# Patient Record
Sex: MALE | Race: WHITE | Employment: OTHER | URBAN - METROPOLITAN AREA
[De-identification: names, ages, dates, MRNs, and addresses within clinical notes are randomized per-mention and may not be internally consistent; named-entity substitution may affect disease eponyms.]

---

## 2017-01-16 ENCOUNTER — OFFICE VISIT (OUTPATIENT)
Dept: RHEUMATOLOGY | Facility: CLINIC | Age: 67
End: 2017-01-16

## 2017-01-16 VITALS
SYSTOLIC BLOOD PRESSURE: 159 MMHG | DIASTOLIC BLOOD PRESSURE: 66 MMHG | WEIGHT: 265 LBS | HEART RATE: 60 BPM | HEIGHT: 73 IN | BODY MASS INDEX: 35.12 KG/M2

## 2017-01-16 DIAGNOSIS — Z51.81 THERAPEUTIC DRUG MONITORING: ICD-10-CM

## 2017-01-16 DIAGNOSIS — M35.3 POLYMYALGIA RHEUMATICA (HCC): Primary | ICD-10-CM

## 2017-01-16 DIAGNOSIS — R70.0 ELEVATED SED RATE: ICD-10-CM

## 2017-01-16 DIAGNOSIS — G89.29 CHRONIC MIDLINE LOW BACK PAIN WITHOUT SCIATICA: ICD-10-CM

## 2017-01-16 DIAGNOSIS — M54.50 CHRONIC MIDLINE LOW BACK PAIN WITHOUT SCIATICA: ICD-10-CM

## 2017-01-16 PROCEDURE — 99214 OFFICE O/P EST MOD 30 MIN: CPT | Performed by: INTERNAL MEDICINE

## 2017-01-16 PROCEDURE — G0463 HOSPITAL OUTPT CLINIC VISIT: HCPCS | Performed by: INTERNAL MEDICINE

## 2017-01-16 RX ORDER — HYDROCODONE BITARTRATE AND ACETAMINOPHEN 10; 325 MG/1; MG/1
2 TABLET ORAL EVERY 8 HOURS PRN
Qty: 180 TABLET | Refills: 0 | Status: SHIPPED | OUTPATIENT
Start: 2017-01-20 | End: 2017-02-19

## 2017-01-16 RX ORDER — HYDROCODONE BITARTRATE AND ACETAMINOPHEN 10; 325 MG/1; MG/1
2 TABLET ORAL EVERY 8 HOURS PRN
Qty: 180 TABLET | Refills: 0 | Status: SHIPPED | OUTPATIENT
Start: 2017-02-19 | End: 2017-03-21

## 2017-01-16 RX ORDER — HYDROCODONE BITARTRATE AND ACETAMINOPHEN 10; 325 MG/1; MG/1
2 TABLET ORAL EVERY 8 HOURS PRN
Qty: 180 TABLET | Refills: 0 | Status: SHIPPED | OUTPATIENT
Start: 2017-03-20 | End: 2017-04-19

## 2017-01-16 NOTE — PATIENT INSTRUCTIONS
1. gabapentin 800mg at night   2. Refill norco 10/325mg 2 tablets  every 8 hours #180 with 2 refills   3. Return to clinic in 3 months.

## 2017-01-16 NOTE — PROGRESS NOTES
Ninfa Juárez is a 78year old male who presents for Patient presents with:  PMR  .   HPI:     He is a pleasant 78 year who has posssible PMR. He is here on 1/16/2017. I had last seen her on 10/21/2016. I had last seen him on  7/18/2016.  I had last seen for his legs - it is really hard for him to walk up stairs. He feels that during the time he had a virus -in 2002 -  years ago. He was getting checked for bone cancer for a few yearss. - looks like roberto/kappa and ivania carcamo.    He had a lot of testeing switching to coumadin from xarleto. He saw cardiologist yetserday. He's able walk without his cane. He is feeling he can move his shsoulders and hips now wihtout a cane while on norco.   D/w him that norco not used for PMR.  He feels he is intolerant to kids.   He has 0/10 pain right now. Wt Readings from Last 2 Encounters:  01/16/17 : 265 lb (120.203 kg)  10/21/16 : 272 lb (123.378 kg)    Body mass index is 34.97 kg/(m^2).         Current Outpatient Prescriptions:  GABAPENTIN 800 MG Oral Tab TAKE ON Garlic 5043 MG Oral Cap Take  by mouth. Take 2 tabs two times per day Disp:  Rfl:    POTASSIUM OR Take 500 mg by mouth. Take 2 tabs. two times per day Disp:  Rfl:    glimepiride (AMARYL) 4 MG Oral Tab 4 mg 2 (two) times daily.  Disp:  Rfl:    LANTUS SOLOS knee tendernes s  Left ankle deviated laterally s/p fusisno       si joint film - 4/16/15 - . Mild degenerative change both sacroiliac joints. 2. Mild to moderate degenerative arthritis lower lumbar spine. 6/12/2015 - hip xrays - b/l  1.  Minimal degene mayte seronnegative arthtritis  - still unclear -d/w pt - he thinks he has PMR more than sero negative RA  -    - hx elevated ESR and Crp -   But not tolerating DMARDS -   - doing well   - he takes 2 norco tid -   He's likely hooked he can't decrease hi coumadin for pacemaker -     Summary:  1. gabapentin 800mg at night   2. Refill norco 10/325mg 2 tablets  every 8 hours #180 with 2 refills   3. Return to clinic in 3 months.          Chantal Monsivais MD  1/16/2017   3:01 PM

## 2017-01-26 ENCOUNTER — PRIOR ORIGINAL RECORDS (OUTPATIENT)
Dept: OTHER | Age: 67
End: 2017-01-26

## 2017-02-08 ENCOUNTER — PRIOR ORIGINAL RECORDS (OUTPATIENT)
Dept: OTHER | Age: 67
End: 2017-02-08

## 2017-04-04 ENCOUNTER — PRIOR ORIGINAL RECORDS (OUTPATIENT)
Dept: OTHER | Age: 67
End: 2017-04-04

## 2017-04-10 RX ORDER — GABAPENTIN 800 MG/1
TABLET ORAL
Qty: 30 TABLET | Refills: 3 | Status: SHIPPED | OUTPATIENT
Start: 2017-04-10 | End: 2017-08-08

## 2017-04-10 NOTE — TELEPHONE ENCOUNTER
LOV:1-16  Last Filled:12-12, #30 with 3 refills  Labs:   Future Appointments  Date Time Provider Hien Morales   4/17/2017 2:30 PM Tila Blackman MD 2014 [facility]       Please Advise

## 2017-04-17 ENCOUNTER — OFFICE VISIT (OUTPATIENT)
Dept: RHEUMATOLOGY | Facility: CLINIC | Age: 67
End: 2017-04-17

## 2017-04-17 VITALS
DIASTOLIC BLOOD PRESSURE: 77 MMHG | HEIGHT: 73 IN | WEIGHT: 261.38 LBS | HEART RATE: 70 BPM | SYSTOLIC BLOOD PRESSURE: 153 MMHG | BODY MASS INDEX: 34.64 KG/M2

## 2017-04-17 DIAGNOSIS — M35.3 POLYMYALGIA RHEUMATICA (HCC): Primary | ICD-10-CM

## 2017-04-17 DIAGNOSIS — Z51.81 THERAPEUTIC DRUG MONITORING: ICD-10-CM

## 2017-04-17 DIAGNOSIS — M54.50 CHRONIC MIDLINE LOW BACK PAIN WITHOUT SCIATICA: ICD-10-CM

## 2017-04-17 DIAGNOSIS — G89.29 CHRONIC MIDLINE LOW BACK PAIN WITHOUT SCIATICA: ICD-10-CM

## 2017-04-17 PROCEDURE — 99214 OFFICE O/P EST MOD 30 MIN: CPT | Performed by: INTERNAL MEDICINE

## 2017-04-17 PROCEDURE — G0463 HOSPITAL OUTPT CLINIC VISIT: HCPCS | Performed by: INTERNAL MEDICINE

## 2017-04-17 RX ORDER — HYDROCODONE BITARTRATE AND ACETAMINOPHEN 10; 325 MG/1; MG/1
2 TABLET ORAL EVERY 8 HOURS PRN
Qty: 180 TABLET | Refills: 0 | Status: SHIPPED | OUTPATIENT
Start: 2017-04-19 | End: 2017-05-19

## 2017-04-17 RX ORDER — HYDROCODONE BITARTRATE AND ACETAMINOPHEN 10; 325 MG/1; MG/1
2 TABLET ORAL EVERY 8 HOURS PRN
Qty: 180 TABLET | Refills: 0 | Status: SHIPPED | OUTPATIENT
Start: 2017-05-19 | End: 2017-06-18

## 2017-04-17 RX ORDER — HYDROCODONE BITARTRATE AND ACETAMINOPHEN 10; 325 MG/1; MG/1
2 TABLET ORAL EVERY 8 HOURS PRN
Qty: 180 TABLET | Refills: 0 | Status: SHIPPED | OUTPATIENT
Start: 2017-06-18 | End: 2017-07-18

## 2017-04-17 NOTE — PROGRESS NOTES
Wilma Moreno is a 78year old male who presents for Patient presents with: Ankle Pain: letft  Knee Pain  Back Pain: lower back  Hip Pain  . HPI:     He is a pleasant 78 year who has posssible PMR. He is here on 4/17/2017.  I had last seen him on 1/16 He had flu 4 times this year in 2015. . During a time of bronchitis - he was on prednisone and he felt so good on it. He felt all his pain went away. He never had PT for his legs - it is really hard for him to walk up stairs.    He feels that during the t He got bad nasuea with azathioprine. He got sick to his stomach. He stopped ssz b/c of this as well. He's only on norco 2 tablets and 2 at night. It's been helping him. The pain is in his lower back and hips and shoulders.  He' s on gabapentin 600mg at ni He has on pain. He take the norco three times a day - if he misses the 4pm dose - 7pm he feels really bad pain a dn he gets a trerible feeling. -   He feels nausea if he misses it. He doesn't use a can all the time and no cane today.    His shoulders are ok Ranitidine HCl (ZANTAC) 150 MG Oral Tab Take 150 mg by mouth nightly. Disp:  Rfl:    Multiple Vitamins-Minerals (MULTIVITAMIN OR) Take  by mouth. Take 1/2 tab two times every day Disp:  Rfl:    GLUCOSAMINE-CHONDROITIN OR Take by mouth.  Take 2 tabs in the [facility] [place] [facility] [place]  -      REVIEW OF SYSTEMS:   Review Of Systems:  All other ROS are negative.      EXAM:   /77 mmHg  Pulse 70  Ht 6' 1\" (1.854 m)  Wt 261 lb 6.4 oz (118.57 kg)  BMI 34.49 kg/m2  HEENT: Clear oropharynx, no oral ulcers, EOM intact, GFR/NON-      >60  50 (L)   GFR/      >60  >60   WBC      4.0 - 11.0 K/UL  9.5   RBC      4.50 - 5.90 M/UL  3.80 (L)   Hemoglobin      13.5 - 17.5 G/DL  12.4 (L)   Hematocrit      41.0 - 52.0 %  36.7 (L)   MCV      80.0 - 10 - cont.  gabepentin  800mg at night.  D/w him about increasing this but he doesn's want to .   - consider 2nd opinion - on how to treat -     - in the past -  had a long discussion about his care - d/w him that rheumatology wise not able to treat his inflam

## 2017-06-26 RX ORDER — FOLIC ACID 1 MG/1
TABLET ORAL
Qty: 60 TABLET | Refills: 11 | Status: SHIPPED | OUTPATIENT
Start: 2017-06-26 | End: 2018-06-29

## 2017-06-26 NOTE — TELEPHONE ENCOUNTER
LOV:1-13  Last Filled:6-21-16, #60 with 11 refills  Labs:   Future Appointments  Date Time Provider Hien Morales   7/17/2017 2:50 PM Yane Burton MD 2014 [facility]       Please Advise

## 2017-07-05 ENCOUNTER — MYAURORA ACCOUNT LINK (OUTPATIENT)
Dept: OTHER | Age: 67
End: 2017-07-05

## 2017-07-17 ENCOUNTER — TELEPHONE (OUTPATIENT)
Dept: RHEUMATOLOGY | Facility: CLINIC | Age: 67
End: 2017-07-17

## 2017-07-17 NOTE — TELEPHONE ENCOUNTER
Pt states he had to miss his appt today because his wife was in ER. Pt states he still needs to be seen ASAP for a follow up and is requesting to be added this week, preferably Wednesday morning. Please advise.

## 2017-07-18 NOTE — TELEPHONE ENCOUNTER
Try to have him book as soon as he can - will fill his norco for one month on Wednesday am he can pick it up

## 2017-07-19 ENCOUNTER — OFFICE VISIT (OUTPATIENT)
Dept: RHEUMATOLOGY | Facility: CLINIC | Age: 67
End: 2017-07-19

## 2017-07-19 VITALS
WEIGHT: 260 LBS | DIASTOLIC BLOOD PRESSURE: 76 MMHG | HEIGHT: 72 IN | HEART RATE: 70 BPM | BODY MASS INDEX: 35.21 KG/M2 | SYSTOLIC BLOOD PRESSURE: 149 MMHG

## 2017-07-19 DIAGNOSIS — M35.3 POLYMYALGIA RHEUMATICA (HCC): Primary | ICD-10-CM

## 2017-07-19 DIAGNOSIS — M54.50 CHRONIC MIDLINE LOW BACK PAIN WITHOUT SCIATICA: ICD-10-CM

## 2017-07-19 DIAGNOSIS — G89.29 CHRONIC MIDLINE LOW BACK PAIN WITHOUT SCIATICA: ICD-10-CM

## 2017-07-19 PROCEDURE — G0463 HOSPITAL OUTPT CLINIC VISIT: HCPCS | Performed by: INTERNAL MEDICINE

## 2017-07-19 PROCEDURE — 99213 OFFICE O/P EST LOW 20 MIN: CPT | Performed by: INTERNAL MEDICINE

## 2017-07-19 RX ORDER — HYDROCODONE BITARTRATE AND ACETAMINOPHEN 10; 325 MG/1; MG/1
2 TABLET ORAL EVERY 8 HOURS PRN
Qty: 180 TABLET | Refills: 0 | Status: SHIPPED | OUTPATIENT
Start: 2017-08-18 | End: 2017-09-17

## 2017-07-19 RX ORDER — HYDROCODONE BITARTRATE AND ACETAMINOPHEN 10; 325 MG/1; MG/1
2 TABLET ORAL EVERY 8 HOURS PRN
Qty: 180 TABLET | Refills: 0 | Status: SHIPPED | OUTPATIENT
Start: 2017-09-17 | End: 2017-10-17

## 2017-07-19 RX ORDER — HYDROCODONE BITARTRATE AND ACETAMINOPHEN 10; 325 MG/1; MG/1
2 TABLET ORAL EVERY 8 HOURS PRN
Qty: 180 TABLET | Refills: 0 | Status: SHIPPED | OUTPATIENT
Start: 2017-07-19 | End: 2017-08-18

## 2017-07-19 NOTE — PROGRESS NOTES
Claudio Mcneill is a 75year old male who presents for Patient presents with: Follow - Up: muscle and joint pain  . HPI:     He is a pleasant 78 year who has posssible PMR. He is here on 7/19/2017. I had last seen him on 4/17/2017.  I had last seen him He had flu 4 times this year in 2015. . During a time of bronchitis - he was on prednisone and he felt so good on it. He felt all his pain went away. He never had PT for his legs - it is really hard for him to walk up stairs.    He feels that during the t He got bad nasuea with azathioprine. He got sick to his stomach. He stopped ssz b/c of this as well. He's only on norco 2 tablets and 2 at night. It's been helping him. The pain is in his lower back and hips and shoulders.  He' s on gabapentin 600mg at ni He has on pain. He take the norco three times a day - if he misses the 4pm dose - 7pm he feels really bad pain a dn he gets a trerible feeling. -   He feels nausea if he misses it. He doesn't use a can all the time and no cane today.    His shoulders are ok Lisinopril-Hydrochlorothiazide 20-12.5 MG Oral Tab daily. Disp:  Rfl:    MetFORMIN HCl ER (GLUCOPHAGE-XR) 500 MG Oral Tablet 24 Hr 500 mg 2 (two) times daily. Disp:  Rfl:    Ranitidine HCl (ZANTAC) 150 MG Oral Tab Take 150 mg by mouth nightly.  Disp:  Rfl: Packs/day: 2.00      Years: 30.00        Quit date: 11/12/2000  Alcohol use:  No               Retired railroad, still at fire department   4 children -   1 daughter 1 nurse, 1 is , 1 beautician,   Son - Holden  -      [address] [place] [id number] 17 - 63 U/L  25   ALK PHOSPHATASE (P)      32 - 100 U/L  86   TOTAL BILIRUBIN      0.3 - 1.2 mg/dL  0.5   TOTAL PROTEIN      5.9 - 8.4 g/dL  7.1   Albumin      3.5 - 4.8 g/dL  3.2 (L)   GLOBULIN, TOTAL      2.5 - 3.7 g/dL  3.9 (H)   A/G RATIO      1.0 - off pred b/c of sugars -doesn't want to restart - .   - stop  methotrexate B/c of nauseas , stoppe leflunomide b/c of sore throat   - stopped imuarn b/c didn't help pain and was nauseous -   - stop  Fa    -  stop ssz b/c of -   - he should call if pain i

## 2017-07-19 NOTE — PATIENT INSTRUCTIONS
1. gabapentin 800mg at night   2. Refill norco 10/325mg 2 tablets  every 8 hours #180 with 2 refills   3. Return to clinic in 3 months. 4. Info on tocalizumab given -   Tocilizumab injection  What is this medicine?   TOCILIZUMAB (TOE si RAVEN ue mab) is use they continue or are bothersome. ):  · dizziness  · headache  · pain, redness, or irritation at site where injected  What may interact with this medicine?   Do not take this medicine with any of the following medications:  · live virus vaccines  This medicin tuberculosis or have recently been in close contact with someone who has tuberculosis  · an unusual or allergic reaction to tocilizumab, other medicines, foods, dyes, or preservatives  · pregnant or trying to get pregnant  · breast-feeding  What should I w prepare and give this medicine. Use exactly as directed. Take your medicine at regular intervals. Do not take your medicine more often than directed. It is important that you put your used needles and syringes in a special sharps container.  Do not put the cortisone  · theophylline  · tositumomab  · vaccines  · warfarin  What if I miss a dose? It is important not to miss your dose. Call your doctor or health care professional if you are unable to keep an appointment.  If you give yourself the medicine and yo TB medicine before starting this medicine. Make sure to finish the full course of TB medicine. Talk to your doctor about your risk of cancer. You may be more at risk for certain types of cancers if you take this medicine.   Call your doctor or health care as young as 2 years for selected conditions, precautions do apply. What side effects may I notice from receiving this medicine?   Side effects that you should report to your doctor or health care professional as soon as possible:  · allergic reactions like medicine?   They need to know if you have any of these conditions:  · cancer  · diabetes  · diverticulitis  · heart disease  · hepatitis B or history of hepatitis B infection  · high blood pressure  · high cholesterol  · history of pancreatitis  · immune sy talk to your doctor, pharmacist, or health care provider.  Copyright© 2016 [employer]

## 2017-08-08 RX ORDER — GABAPENTIN 800 MG/1
TABLET ORAL
Qty: 30 TABLET | Refills: 3 | Status: SHIPPED | OUTPATIENT
Start: 2017-08-08 | End: 2018-10-18

## 2017-08-23 ENCOUNTER — PRIOR ORIGINAL RECORDS (OUTPATIENT)
Dept: OTHER | Age: 67
End: 2017-08-23

## 2017-10-18 ENCOUNTER — OFFICE VISIT (OUTPATIENT)
Dept: RHEUMATOLOGY | Facility: CLINIC | Age: 67
End: 2017-10-18

## 2017-10-18 ENCOUNTER — APPOINTMENT (OUTPATIENT)
Dept: LAB | Facility: HOSPITAL | Age: 67
End: 2017-10-18
Attending: INTERNAL MEDICINE
Payer: MEDICARE

## 2017-10-18 VITALS
HEIGHT: 72 IN | DIASTOLIC BLOOD PRESSURE: 76 MMHG | SYSTOLIC BLOOD PRESSURE: 150 MMHG | HEART RATE: 71 BPM | BODY MASS INDEX: 34.95 KG/M2 | WEIGHT: 258 LBS

## 2017-10-18 DIAGNOSIS — F11.23 WITHDRAWAL FROM OPIOIDS (HCC): ICD-10-CM

## 2017-10-18 DIAGNOSIS — M79.10 MYALGIA: ICD-10-CM

## 2017-10-18 DIAGNOSIS — Z51.81 THERAPEUTIC DRUG MONITORING: ICD-10-CM

## 2017-10-18 DIAGNOSIS — M35.3 PMR (POLYMYALGIA RHEUMATICA) (HCC): ICD-10-CM

## 2017-10-18 DIAGNOSIS — F32.A DEPRESSION, UNSPECIFIED DEPRESSION TYPE: ICD-10-CM

## 2017-10-18 DIAGNOSIS — M35.3 PMR (POLYMYALGIA RHEUMATICA) (HCC): Primary | ICD-10-CM

## 2017-10-18 PROCEDURE — 36415 COLL VENOUS BLD VENIPUNCTURE: CPT

## 2017-10-18 PROCEDURE — 99214 OFFICE O/P EST MOD 30 MIN: CPT | Performed by: INTERNAL MEDICINE

## 2017-10-18 PROCEDURE — 86140 C-REACTIVE PROTEIN: CPT

## 2017-10-18 PROCEDURE — G0463 HOSPITAL OUTPT CLINIC VISIT: HCPCS | Performed by: INTERNAL MEDICINE

## 2017-10-18 PROCEDURE — 85652 RBC SED RATE AUTOMATED: CPT

## 2017-10-18 RX ORDER — HYDROCODONE BITARTRATE AND ACETAMINOPHEN 10; 325 MG/1; MG/1
2 TABLET ORAL EVERY 8 HOURS PRN
Qty: 180 TABLET | Refills: 0 | Status: SHIPPED | OUTPATIENT
Start: 2017-11-18 | End: 2017-12-18

## 2017-10-18 RX ORDER — HYDROCODONE BITARTRATE AND ACETAMINOPHEN 10; 325 MG/1; MG/1
2 TABLET ORAL 2 TIMES DAILY
Status: ON HOLD | COMMUNITY
End: 2018-10-27

## 2017-10-18 RX ORDER — DULOXETIN HYDROCHLORIDE 20 MG/1
CAPSULE, DELAYED RELEASE ORAL
Qty: 60 CAPSULE | Refills: 1 | Status: SHIPPED | OUTPATIENT
Start: 2017-10-18 | End: 2017-12-26

## 2017-10-18 RX ORDER — HYDROCODONE BITARTRATE AND ACETAMINOPHEN 10; 325 MG/1; MG/1
2 TABLET ORAL EVERY 8 HOURS PRN
Qty: 180 TABLET | Refills: 0 | Status: SHIPPED | OUTPATIENT
Start: 2017-12-18 | End: 2018-01-17

## 2017-10-18 RX ORDER — HYDROCODONE BITARTRATE AND ACETAMINOPHEN 10; 325 MG/1; MG/1
2 TABLET ORAL EVERY 8 HOURS PRN
Qty: 180 TABLET | Refills: 0 | Status: SHIPPED | OUTPATIENT
Start: 2017-10-18 | End: 2017-11-17

## 2017-10-18 NOTE — PROGRESS NOTES
Lan Cruz is a 75year old male who presents for Patient presents with:  PMR: muscle pain, pain wakes him up  . HPI:     He is a pleasant 78 year who has posssible PMR. He is here on 10/18/2017. I had alst seen him on 7/19/2017.  I had last seen hi He had flu 4 times this year in 2015. . During a time of bronchitis - he was on prednisone and he felt so good on it. He felt all his pain went away. He never had PT for his legs - it is really hard for him to walk up stairs.    He feels that during the t He got bad nasuea with azathioprine. He got sick to his stomach. He stopped ssz b/c of this as well. He's only on norco 2 tablets and 2 at night. It's been helping him. The pain is in his lower back and hips and shoulders.  He' s on gabapentin 600mg at ni He has on pain. He take the norco three times a day - if he misses the 4pm dose - 7pm he feels really bad pain a dn he gets a trerible feeling. -   He feels nausea if he misses it. He doesn't use a can all the time and no cane today.    His shoulders are ok Wt Readings from Last 2 Encounters:  10/18/17 : 258 lb (117 kg)  07/19/17 : 260 lb (117.9 kg)    Body mass index is 34.99 kg/m².         Current Outpatient Prescriptions:  HYDROcodone-acetaminophen  MG Oral Tab Take 1 tablet by mouth 3 (three) times d LANTUS SOLOSTAR 100 UNIT/ML Subcutaneous Solution Pen-injector 50 Units every morning. Disp:  Rfl:    tamsulosin HCl (FLOMAX) 0.4 MG Oral Cap 2 tablets daily.  Disp:  Rfl:       Past Medical History:   Diagnosis Date   • GAB on CPAP    • Type II or unspec 2. Mild to moderate degenerative arthritis lower lumbar spine. 6/12/2015 - hip xrays - b/l  1. Minimal degenerative change both hips. 2. Moderate degenerative arthritis lower lumbar spine.       Component      Latest Ref Rng 1/18/2016 10/16/2015   Gluco 1. Myalgia/polyarthralgia  - PMR verssuss seronnegative arthtritis  - still unclear -d/w pt - he thinks he has PMR more than sero negative RA  -    - hx elevated ESR and Crp -   But not tolerating DMARDS - tried all of them -   - gave pt.  informatin on act - declines physical therpay -   - encoufaged him to do hoem exercies  - d/w him about new agent for PMR called tocalizumab - he declines to use this   He wants to take dulextine 20mg bid - first an then next time will   - had long d/w him - he is going ot

## 2017-10-18 NOTE — PATIENT INSTRUCTIONS
1. Start duloxetine 20mg a day - x 1-2 weeks, then increase to twice ad ay -   2. Refill norco 10/325mg 2 tablets  every 8 hours #180 with 2 refills   After 1 month - try to elminate the 4 pm ones -   Plan to decrease back to two at night -   3.  Return to

## 2017-12-26 RX ORDER — DULOXETIN HYDROCHLORIDE 20 MG/1
20 CAPSULE, DELAYED RELEASE ORAL 2 TIMES DAILY
Qty: 60 CAPSULE | Refills: 1 | Status: SHIPPED | OUTPATIENT
Start: 2017-12-26 | End: 2018-02-20

## 2017-12-26 NOTE — TELEPHONE ENCOUNTER
LOV: 10-18-17  Last Refilled:#60, 1rf 10/18/17    Future Appointments  Date Time Provider Hien Carmen   1/22/2018 11:00 AM America Batres MD [address] [place] [address]       Please advise.

## 2018-02-07 ENCOUNTER — OFFICE VISIT (OUTPATIENT)
Dept: RHEUMATOLOGY | Facility: CLINIC | Age: 68
End: 2018-02-07

## 2018-02-07 VITALS
SYSTOLIC BLOOD PRESSURE: 153 MMHG | BODY MASS INDEX: 34.81 KG/M2 | DIASTOLIC BLOOD PRESSURE: 73 MMHG | HEART RATE: 70 BPM | HEIGHT: 72 IN | WEIGHT: 257 LBS

## 2018-02-07 DIAGNOSIS — Z51.81 THERAPEUTIC DRUG MONITORING: ICD-10-CM

## 2018-02-07 DIAGNOSIS — F11.23 WITHDRAWAL FROM OPIOIDS (HCC): ICD-10-CM

## 2018-02-07 DIAGNOSIS — M35.3 PMR (POLYMYALGIA RHEUMATICA) (HCC): Primary | ICD-10-CM

## 2018-02-07 PROCEDURE — 99214 OFFICE O/P EST MOD 30 MIN: CPT | Performed by: INTERNAL MEDICINE

## 2018-02-07 PROCEDURE — G0463 HOSPITAL OUTPT CLINIC VISIT: HCPCS | Performed by: INTERNAL MEDICINE

## 2018-02-07 RX ORDER — HYDROCODONE BITARTRATE AND ACETAMINOPHEN 10; 325 MG/1; MG/1
1 TABLET ORAL EVERY 6 HOURS PRN
Qty: 120 TABLET | Refills: 0 | Status: SHIPPED | OUTPATIENT
Start: 2018-02-07 | End: 2018-03-09

## 2018-02-07 RX ORDER — HYDROCODONE BITARTRATE AND ACETAMINOPHEN 10; 325 MG/1; MG/1
1 TABLET ORAL EVERY 6 HOURS PRN
Qty: 120 TABLET | Refills: 0 | Status: SHIPPED | OUTPATIENT
Start: 2018-03-09 | End: 2018-04-08

## 2018-02-07 RX ORDER — HYDROCODONE BITARTRATE AND ACETAMINOPHEN 10; 325 MG/1; MG/1
1 TABLET ORAL EVERY 6 HOURS PRN
Qty: 120 TABLET | Refills: 0 | Status: SHIPPED | OUTPATIENT
Start: 2018-04-09 | End: 2018-05-09

## 2018-02-07 NOTE — PATIENT INSTRUCTIONS
1. Cont. duloxetine 20mg twice ad ay -   2. Refill norco 10/325mg taper to 1 in am and 2 in pm - #120 given so he can slowly taper it -   3. Return to clinic in 3 months. 4.  tocalizumab given - plan on this for the future if needed.

## 2018-02-07 NOTE — PROGRESS NOTES
Aimee Tian is a 75year old male who presents for Patient presents with:  PMR  Back Pain  Knee Pain  . HPI:     He is a pleasant 75 year who has posssible PMR. He is here on 2/7/2018. I had last seen him on 10/18/2017.  I had alst seen him on 7/19/2 He had flu 4 times this year in 2015. . During a time of bronchitis - he was on prednisone and he felt so good on it. He felt all his pain went away. He never had PT for his legs - it is really hard for him to walk up stairs.    He feels that during the t He got bad nasuea with azathioprine. He got sick to his stomach. He stopped ssz b/c of this as well. He's only on norco 2 tablets and 2 at night. It's been helping him. The pain is in his lower back and hips and shoulders.  He' s on gabapentin 600mg at ni He has on pain. He take the norco three times a day - if he misses the 4pm dose - 7pm he feels really bad pain a dn he gets a trerible feeling. -   He feels nausea if he misses it. He doesn't use a can all the time and no cane today.    His shoulders are ok He's doing really good. He's going to gym and working out. He stopped taking the norco in the afternoon. He's only taking in the am and pm.   His pain level is not too bad unless he walks for a long distance.    hes' been taking the cymbalta 20mg twice Cyanocobalamin (B-12) 1000 MCG Oral Cap Take by mouth daily. Take 1 tab two times a day  Disp:  Rfl:    Cranberry 450 MG Oral Tab Take 5 tablets by mouth 2 (two) times daily.  Take 2 tabs two times per day  Disp:  Rfl:    Garlic 5214 MG Oral Cap Take  by mo ABD: Soft Non tender,   Joint exam:   Not tender in b/l shoulders - better today , able to raise shoulders -   Muscles in thighs and calves not tender on palpiation   Able to get up and off table easily wihtout cane -   Trochanteric bursits -not  tender b/ Platelet Count      046 - 400 K/UL  133 (L)   MEAN PLATELET VOLUME      7.4 - 10.3 FL  9.0   C-REACTIVE PROTEIN (CRP)(S)      0.0 - 0.9 mg/dL 3.2 (H)    SED RATE (ESR) (L)      0 - 20 MM/HR 48 (H)      [facility]:        ASSESSMENT AND PLAN:   SHERRIE - in the past -  had a long discussion about his care - d/w him that rheumatology wise not able to treat his inflammation - he should seek 2nd opioin. He feels norco iscontrolling his pain.  He is declining any more pain meds that would require pain managem

## 2018-02-21 RX ORDER — DULOXETIN HYDROCHLORIDE 20 MG/1
CAPSULE, DELAYED RELEASE ORAL
Qty: 180 CAPSULE | Refills: 1 | Status: SHIPPED | OUTPATIENT
Start: 2018-02-21 | End: 2018-05-09 | Stop reason: DRUGHIGH

## 2018-02-21 NOTE — TELEPHONE ENCOUNTER
LOV:2-7  Last Filled:12-26, #60 with 1 refill  Labs:   Future Appointments  Date Time Provider Hien Morales   5/9/2018 2:50 PM Moses Rodriguez MD 2014 [facility]       Please Advise

## 2018-04-16 ENCOUNTER — MYAURORA ACCOUNT LINK (OUTPATIENT)
Dept: OTHER | Age: 68
End: 2018-04-16

## 2018-05-02 ENCOUNTER — TELEPHONE (OUTPATIENT)
Dept: RHEUMATOLOGY | Facility: CLINIC | Age: 68
End: 2018-05-02

## 2018-05-02 NOTE — TELEPHONE ENCOUNTER
Received a call from Keniu at [employer]. Verified Norco Rx with him.  He was suggesting that she orders Narcan when ordering [place].

## 2018-05-09 ENCOUNTER — OFFICE VISIT (OUTPATIENT)
Dept: RHEUMATOLOGY | Facility: CLINIC | Age: 68
End: 2018-05-09

## 2018-05-09 VITALS
SYSTOLIC BLOOD PRESSURE: 162 MMHG | HEART RATE: 71 BPM | HEIGHT: 72 IN | DIASTOLIC BLOOD PRESSURE: 80 MMHG | BODY MASS INDEX: 35.76 KG/M2 | WEIGHT: 264 LBS

## 2018-05-09 DIAGNOSIS — M35.3 PMR (POLYMYALGIA RHEUMATICA) (HCC): Primary | ICD-10-CM

## 2018-05-09 DIAGNOSIS — Z51.81 THERAPEUTIC DRUG MONITORING: ICD-10-CM

## 2018-05-09 DIAGNOSIS — F32.A DEPRESSION, UNSPECIFIED DEPRESSION TYPE: ICD-10-CM

## 2018-05-09 DIAGNOSIS — F11.23 WITHDRAWAL FROM OPIOIDS (HCC): ICD-10-CM

## 2018-05-09 PROCEDURE — G0463 HOSPITAL OUTPT CLINIC VISIT: HCPCS | Performed by: INTERNAL MEDICINE

## 2018-05-09 PROCEDURE — 99214 OFFICE O/P EST MOD 30 MIN: CPT | Performed by: INTERNAL MEDICINE

## 2018-05-09 RX ORDER — PEN NEEDLE, DIABETIC 30 GX5/16"
NEEDLE, DISPOSABLE MISCELLANEOUS
COMMUNITY
Start: 2018-04-16

## 2018-05-09 RX ORDER — DULOXETIN HYDROCHLORIDE 30 MG/1
30 CAPSULE, DELAYED RELEASE ORAL 2 TIMES DAILY
Qty: 60 CAPSULE | Refills: 3 | Status: SHIPPED | OUTPATIENT
Start: 2018-05-09 | End: 2018-08-10

## 2018-05-09 RX ORDER — GLUCOSAMINE HCL/CHONDROITIN SU 500-400 MG
CAPSULE ORAL
COMMUNITY
Start: 2018-04-16

## 2018-05-09 RX ORDER — HYDROCODONE BITARTRATE AND ACETAMINOPHEN 10; 325 MG/1; MG/1
1 TABLET ORAL EVERY 6 HOURS PRN
Qty: 120 TABLET | Refills: 0 | Status: SHIPPED | OUTPATIENT
Start: 2018-07-08 | End: 2018-06-01 | Stop reason: DRUGHIGH

## 2018-05-09 RX ORDER — HYDROCODONE BITARTRATE AND ACETAMINOPHEN 10; 325 MG/1; MG/1
1 TABLET ORAL EVERY 6 HOURS PRN
Qty: 120 TABLET | Refills: 0 | Status: SHIPPED | OUTPATIENT
Start: 2018-06-08 | End: 2018-06-01 | Stop reason: DRUGHIGH

## 2018-05-09 RX ORDER — HYDROCODONE BITARTRATE AND ACETAMINOPHEN 10; 325 MG/1; MG/1
1 TABLET ORAL EVERY 6 HOURS PRN
Qty: 120 TABLET | Refills: 0 | Status: SHIPPED | OUTPATIENT
Start: 2018-05-09 | End: 2018-06-08

## 2018-05-09 NOTE — PATIENT INSTRUCTIONS
1. Cont. duloxetine - increase to 30mg twice ad ay -   2. Refill norco 10/325mg 2 in am and 2 in pm - #120 with 2 refills   3. Return to clinic in 3 months. 4.  tocalizumab  plan on this for the future if needed.

## 2018-05-09 NOTE — PROGRESS NOTES
Willow Graham is a 75year old male who presents for Patient presents with:  PMR  Knee Pain  Hip Pain  Back Pain  . HPI:     He is a pleasant 75 year who has posssible PMR. He is here on 5/9/2018. I Had last seen him on  2/7/2018.   He worked for [employer] The pain has gotten worse - he had a lot of pain in 2012 when checked for bone cancer. He had more pain at that time too bu tit's worses now. Al Benjamin He wa getting a lot of UTIs with self cahterirzation. He doesn't get as many now.         8/17/2015   He starte He hasn't gotten UTI for a while. HE is taking cranberry pills - several.   No fevers, no headaches , no jaw pain. 1/18/2016  He got opinoin from dr. Carlos Cardenas on left foot.  He had an opion to straighten his left ankle 2 years ago but ddint' want to get [facility] He has tried decreasing his norco by 1 tablet and he couldn't do it. He has more pain in his shoulder,s hips and knees. In the morning his back and hips can really hurt. He feels his pain is on and off.    He sleeps throught the night with his cpap and ga He's using the ellipitcal and upper body work out at [employer] - he's feeling better doing this. He's taking norco 2 in am and 2 in pm . He's not able to taper it anymore. Taking 3 tylelenol pm at night as well.  He's groggy in the am. He sleeps well thr GLUCOSAMINE-CHONDROITIN OR Take by mouth. Take 2 tabs in the AM  Disp:  Rfl:    Omega-3 Fatty Acids (FISH OIL) 1200 MG Oral Cap Take  by mouth daily. Disp:  Rfl:    Coenzyme Q10 (COQ10) 200 MG Oral Cap Take 200 mg by mouth daily.    Disp:  Rfl:    Cholecalc BP (!) 162/80 (BP Location: Right arm, Patient Position: Sitting, Cuff Size: large)   Pulse 71   Ht 6' (1.829 m)   Wt 264 lb (119.7 kg)   BMI 35.80 kg/m²   HEENT: Clear oropharynx, no oral ulcers, EOM intact, clear sclear, PERRLA, pleasant, no acute distre >60  50 (L)   GFR/      >60  >60   WBC      4.0 - 11.0 K/UL  9.5   RBC      4.50 - 5.90 M/UL  3.80 (L)   Hemoglobin      13.5 - 17.5 G/DL  12.4 (L)   Hematocrit      41.0 - 52.0 %  36.7 (L)   MCV      80.0 - 100.0 FL  96.5   Mean Corpus - cont.  gabepentin  800mg at night.  D/w him about increasing this but he doesn's want to .    - ok now with increasing cymbalta 30mg twice a day   - asked him to think about right knee injeciton - declining today - and declining right knee xray     - in t

## 2018-05-24 ENCOUNTER — HOSPITAL ENCOUNTER (OUTPATIENT)
Dept: CT IMAGING | Facility: HOSPITAL | Age: 68
Discharge: HOME | End: 2018-05-24
Attending: INTERNAL MEDICINE
Payer: MEDICARE

## 2018-05-24 DIAGNOSIS — M54.2 NECK PAIN ON RIGHT SIDE: ICD-10-CM

## 2018-05-24 PROCEDURE — 72125 CT NECK SPINE W/O DYE: CPT | Performed by: INTERNAL MEDICINE

## 2018-05-29 ENCOUNTER — TELEPHONE (OUTPATIENT)
Dept: RHEUMATOLOGY | Facility: CLINIC | Age: 68
End: 2018-05-29

## 2018-05-29 NOTE — TELEPHONE ENCOUNTER
Pt states he was in an accident and in pain and the Pt states would like to talk with Dr. Marleny Cruz ASAP and also states some of meds was added by his PCP.

## 2018-05-29 NOTE — TELEPHONE ENCOUNTER
Called and spoke with pt, states he injured himself last week Wednesday 5/23/18 while moving trampoline outside for his granddaughter. Pt states he blew his right knee out and hurt his neck. He seen PCP Dr. Meryl Gleason whom ordered x-ray & CT scan.  Due to inj

## 2018-05-31 NOTE — TELEPHONE ENCOUNTER
Talked to pt.  - probably - will see ortho and get ct scan   He injured his right knee and neck moving a trampoline -   He's taking more norco - 2 tablets twice a day - to 2 tablets 3 tablets a day - he's taking cyclobenzaprine which is helping   He can bar

## 2018-06-01 ENCOUNTER — TELEPHONE (OUTPATIENT)
Dept: RHEUMATOLOGY | Facility: CLINIC | Age: 68
End: 2018-06-01

## 2018-06-01 RX ORDER — HYDROCODONE BITARTRATE AND ACETAMINOPHEN 10; 325 MG/1; MG/1
2 TABLET ORAL EVERY 8 HOURS PRN
Qty: 150 TABLET | Refills: 0 | Status: SHIPPED | OUTPATIENT
Start: 2018-07-01 | End: 2018-06-01

## 2018-06-01 RX ORDER — HYDROCODONE BITARTRATE AND ACETAMINOPHEN 10; 325 MG/1; MG/1
2 TABLET ORAL EVERY 8 HOURS PRN
Qty: 180 TABLET | Refills: 0 | Status: SHIPPED | OUTPATIENT
Start: 2018-08-01 | End: 2018-08-31

## 2018-06-01 RX ORDER — HYDROCODONE BITARTRATE AND ACETAMINOPHEN 10; 325 MG/1; MG/1
2 TABLET ORAL EVERY 8 HOURS PRN
Qty: 180 TABLET | Refills: 0 | Status: SHIPPED | OUTPATIENT
Start: 2018-06-01 | End: 2018-07-01

## 2018-06-01 RX ORDER — HYDROCODONE BITARTRATE AND ACETAMINOPHEN 10; 325 MG/1; MG/1
2 TABLET ORAL EVERY 8 HOURS PRN
Qty: 180 TABLET | Refills: 0 | Status: SHIPPED | OUTPATIENT
Start: 2018-07-01 | End: 2018-07-31

## 2018-06-01 RX ORDER — HYDROCODONE BITARTRATE AND ACETAMINOPHEN 10; 325 MG/1; MG/1
2 TABLET ORAL EVERY 8 HOURS PRN
Qty: 150 TABLET | Refills: 0 | Status: SHIPPED | OUTPATIENT
Start: 2018-06-01 | End: 2018-06-01

## 2018-06-01 RX ORDER — HYDROCODONE BITARTRATE AND ACETAMINOPHEN 10; 325 MG/1; MG/1
2 TABLET ORAL EVERY 8 HOURS PRN
Qty: 150 TABLET | Refills: 0 | Status: SHIPPED | OUTPATIENT
Start: 2018-08-01 | End: 2018-06-01

## 2018-06-01 NOTE — TELEPHONE ENCOUNTER
Phoned patient and informed him the [facility] is ready for  at the [facility]. Patient will be over today.

## 2018-06-01 NOTE — TELEPHONE ENCOUNTER
[facility] and brought back three old scripts back.   Scripts given to Dr. Alisia Londono.

## 2018-06-01 NOTE — TELEPHONE ENCOUNTER
Patient calling to check status of refill for     HYDROcodone-acetaminophen  MG Oral Tab Take 1 tablet by mouth every 6 (six) hours as needed for Pain.  Disp: 120 tablet Rfl: 0   [START ON 6/8/2018] HYDROcodone-acetaminophen  MG Oral Tab Take 1

## 2018-06-11 ENCOUNTER — HOSPITAL ENCOUNTER (OUTPATIENT)
Dept: CT IMAGING | Facility: HOSPITAL | Age: 68
Discharge: HOME | End: 2018-06-11
Attending: INTERNAL MEDICINE
Payer: MEDICARE

## 2018-06-11 ENCOUNTER — HOSPITAL ENCOUNTER (OUTPATIENT)
Dept: ULTRASOUND IMAGING | Facility: HOSPITAL | Age: 68
Discharge: HOME | End: 2018-06-11
Attending: INTERNAL MEDICINE
Payer: MEDICARE

## 2018-06-11 DIAGNOSIS — E04.1 THYROID NODULE: ICD-10-CM

## 2018-06-11 DIAGNOSIS — R91.1 PULMONARY NODULE: ICD-10-CM

## 2018-06-11 PROCEDURE — 71250 CT THORAX DX C-: CPT | Performed by: INTERNAL MEDICINE

## 2018-06-11 PROCEDURE — 76536 US EXAM OF HEAD AND NECK: CPT | Performed by: INTERNAL MEDICINE

## 2018-06-29 RX ORDER — FOLIC ACID 1 MG/1
TABLET ORAL
Qty: 180 TABLET | Refills: 3 | Status: SHIPPED | OUTPATIENT
Start: 2018-06-29 | End: 2021-04-19

## 2018-06-29 NOTE — TELEPHONE ENCOUNTER
LOV:5-9  Last Filled:6-26-17, #60 with 11 refills  Labs:   Future Appointments  Date Time Provider Hien Morales   8/10/2018 2:30 PM Efraín Pichardo MD 2014 [facility]       Please Advise

## 2018-07-02 ENCOUNTER — TELEPHONE (OUTPATIENT)
Dept: RHEUMATOLOGY | Facility: CLINIC | Age: 68
End: 2018-07-02

## 2018-07-23 PROBLEM — M23.91 ACUTE INTERNAL DERANGEMENT OF RIGHT KNEE: Status: ACTIVE | Noted: 2018-07-23

## 2018-07-26 ENCOUNTER — TELEPHONE (OUTPATIENT)
Dept: RHEUMATOLOGY | Facility: CLINIC | Age: 68
End: 2018-07-26

## 2018-07-26 NOTE — TELEPHONE ENCOUNTER
Pharmacy calling to verify prescription. Please advise      Current Outpatient Prescriptions:        HYDROcodone-acetaminophen  MG Oral Tab Take 2 tablets by mouth every 8 (eight) hours as needed for Pain.  Disp: 180 tablet Rfl: 0

## 2018-07-27 NOTE — TELEPHONE ENCOUNTER
PMR (polymyalgia rheumatica) ([facility]) M35.3   Pharmacy notified with understanding.

## 2018-07-27 NOTE — TELEPHONE ENCOUNTER
Wilfrido/pharmacy is calling to f/u on msg below. He would like to know why there dispensing it? Diagnosis?

## 2018-07-31 ENCOUNTER — HOSPITAL ENCOUNTER (OUTPATIENT)
Dept: CT IMAGING | Facility: HOSPITAL | Age: 68
Discharge: HOME | End: 2018-07-31
Attending: ORTHOPAEDIC SURGERY
Payer: MEDICARE

## 2018-07-31 ENCOUNTER — HOSPITAL ENCOUNTER (OUTPATIENT)
Dept: GENERAL RADIOLOGY | Facility: HOSPITAL | Age: 68
Discharge: HOME | End: 2018-07-31
Attending: ORTHOPAEDIC SURGERY
Payer: MEDICARE

## 2018-07-31 DIAGNOSIS — M23.91 ACUTE INTERNAL DERANGEMENT OF RIGHT KNEE: ICD-10-CM

## 2018-07-31 PROCEDURE — 77002 NEEDLE LOCALIZATION BY XRAY: CPT | Performed by: ORTHOPAEDIC SURGERY

## 2018-07-31 PROCEDURE — 73701 CT LOWER EXTREMITY W/DYE: CPT | Performed by: ORTHOPAEDIC SURGERY

## 2018-07-31 PROCEDURE — 27370 XR KNEE ARTHROGRAM INJECTION W/CT, RT (CPT=77002/27370): CPT | Performed by: ORTHOPAEDIC SURGERY

## 2018-07-31 RX ORDER — LIDOCAINE HYDROCHLORIDE 10 MG/ML
5 INJECTION, SOLUTION EPIDURAL; INFILTRATION; INTRACAUDAL; PERINEURAL ONCE
Status: DISCONTINUED | OUTPATIENT
Start: 2018-07-31 | End: 2018-08-02

## 2018-07-31 RX ORDER — LIDOCAINE HYDROCHLORIDE 10 MG/ML
INJECTION, SOLUTION EPIDURAL; INFILTRATION; INTRACAUDAL; PERINEURAL
Status: DISPENSED
Start: 2018-07-31 | End: 2018-07-31

## 2018-08-01 ENCOUNTER — MYAURORA ACCOUNT LINK (OUTPATIENT)
Dept: OTHER | Age: 68
End: 2018-08-01

## 2018-08-02 PROBLEM — M17.11 PRIMARY OSTEOARTHRITIS OF RIGHT KNEE: Status: ACTIVE | Noted: 2018-08-02

## 2018-08-10 ENCOUNTER — OFFICE VISIT (OUTPATIENT)
Dept: RHEUMATOLOGY | Facility: CLINIC | Age: 68
End: 2018-08-10
Payer: MEDICARE

## 2018-08-10 VITALS
BODY MASS INDEX: 34.19 KG/M2 | HEART RATE: 71 BPM | DIASTOLIC BLOOD PRESSURE: 75 MMHG | WEIGHT: 258 LBS | SYSTOLIC BLOOD PRESSURE: 166 MMHG | HEIGHT: 73 IN

## 2018-08-10 DIAGNOSIS — M15.9 OSTEOARTHRITIS OF MULTIPLE JOINTS, UNSPECIFIED OSTEOARTHRITIS TYPE: ICD-10-CM

## 2018-08-10 DIAGNOSIS — M35.3 PMR (POLYMYALGIA RHEUMATICA) (HCC): Primary | ICD-10-CM

## 2018-08-10 DIAGNOSIS — R70.0 ELEVATED SED RATE: ICD-10-CM

## 2018-08-10 PROCEDURE — 99214 OFFICE O/P EST MOD 30 MIN: CPT | Performed by: INTERNAL MEDICINE

## 2018-08-10 PROCEDURE — G0463 HOSPITAL OUTPT CLINIC VISIT: HCPCS | Performed by: INTERNAL MEDICINE

## 2018-08-10 RX ORDER — HYDROCODONE BITARTRATE AND ACETAMINOPHEN 10; 325 MG/1; MG/1
2 TABLET ORAL EVERY 8 HOURS PRN
Qty: 180 TABLET | Refills: 0 | Status: ON HOLD | OUTPATIENT
Start: 2018-10-30 | End: 2018-10-27

## 2018-08-10 RX ORDER — HYDROCODONE BITARTRATE AND ACETAMINOPHEN 10; 325 MG/1; MG/1
2 TABLET ORAL EVERY 8 HOURS PRN
Qty: 180 TABLET | Refills: 0 | Status: ON HOLD | OUTPATIENT
Start: 2018-09-30 | End: 2018-10-27

## 2018-08-10 RX ORDER — HYDROCODONE BITARTRATE AND ACETAMINOPHEN 10; 325 MG/1; MG/1
2 TABLET ORAL EVERY 8 HOURS PRN
Qty: 180 TABLET | Refills: 0 | Status: SHIPPED | OUTPATIENT
Start: 2018-08-30 | End: 2018-09-29

## 2018-08-10 RX ORDER — NALOXONE HYDROCHLORIDE 4 MG/.1ML
4 SPRAY, METERED NASAL AS NEEDED
Qty: 1 EACH | Refills: 1 | Status: SHIPPED | OUTPATIENT
Start: 2018-08-10 | End: 2018-09-06 | Stop reason: ALTCHOICE

## 2018-08-10 RX ORDER — DULOXETIN HYDROCHLORIDE 60 MG/1
60 CAPSULE, DELAYED RELEASE ORAL DAILY
Qty: 90 CAPSULE | Refills: 1 | Status: SHIPPED | OUTPATIENT
Start: 2018-08-10 | End: 2018-10-17

## 2018-08-10 NOTE — PROGRESS NOTES
Afshan Charles is a 70year old male who presents for Patient presents with:  Osteoarthritis  . HPI:     He is a pleasant 75 year who has posssible PMR. He is here on 8/10 /2018. I had last seen him on 5/9/2018. I Had last seen him on  2/7/2018.   He The pain has gotten worse - he had a lot of pain in 2012 when checked for bone cancer. He had more pain at that time too bu tit's worses now. Guadalupe Riedel He wa getting a lot of UTIs with self cahterirzation. He doesn't get as many now.         8/17/2015   He starte He hasn't gotten UTI for a while. HE is taking cranberry pills - several.   No fevers, no headaches , no jaw pain. 1/18/2016  He got opinoin from dr. Caden Wilkerson on left foot.  He had an opion to straighten his left ankle 2 years ago but ddint' want to get [facility] He has tried decreasing his norco by 1 tablet and he couldn't do it. He has more pain in his shoulder,s hips and knees. In the morning his back and hips can really hurt. He feels his pain is on and off.    He sleeps throught the night with his cpap and ga He's using the ellipitcal and upper body work out at [employer] - he's feeling better doing this. He's taking norco 2 in am and 2 in pm . He's not able to taper it anymore. Taking 3 tylelenol pm at night as well.  He's groggy in the am. He sleeps well thr GABAPENTIN 800 MG Oral Tab TAKE ONE TABLET BY MOUTH IN THE EVENING Disp: 30 tablet Rfl: 3   Warfarin Sodium 5 MG Oral Tab Take 1 to 1.5 tabs daily as directed by [facility] anticoagulation clinic Disp:  Rfl:    Metoprolol Succinate ER 50 MG Oral Tablet 24 Hr 50 2012: CONTACT LASER SURGERY OF PROSTATE  2000: HERNIA SURGERY  No date: OTHER SURGICAL HISTORY  No date: OTHER SURGICAL HISTORY Left      Comment: ankle surgery  No date: PACEMAKER MONITOR  1977: TONSILLECTOMY   Family History   Problem Relation Age of Ons Latest Ref Rng 1/18/2016 10/16/2015   Glucose      65 - 99 mg/dL  276 (H)   Sodium      136 - 144 mmol/L  138   Potassium      3.3 - 5.1 mmol/L  3.9   Chloride      95 - 110 mmol/L  103   CARBON DIOXIDE (P)      22 - 32 mmol/L  30   BLOOD UREA NITROGEN He doenst want to do this yet - his sed rate and crp is still high   - he's only able to tolerate pain meds - norco - d/w heirm extensively about this -   He is tapered his norco to 2 bid - not able to taper past this -   Will try to increase duloxetine to - had long d/w him - he is going ot try the cymbalta and he will try to taper down the norco b/c he realizes he is affected by it. - he felt gabpentin 800mg at night didn't help him - so he stopped.    2. DM - better controlled - but not completely  - in

## 2018-08-10 NOTE — PATIENT INSTRUCTIONS
1. Cont. Duloxetine - 60mg ad ay   2. Refill norco 10/325mg 2 every 8 hourse #180 -   3. Return to clinic in 3 months. 4.  tocalizumab - consider this - info given on this - and info on cimzia given   5. Cont.  Physical therapy -   Certolizumab pegol inje light-colored stools; loss of appetite; nausea; right upper belly pain; unusually weak or tired; yellowing of the eyes or skin  · swelling of the legs or ankles  · swollen lymph nodes in the neck, underarm, or groin areas  · unexplained weight loss  · unus red cell counts  · multiple sclerosis  · recently received or scheduled to receive a vaccine  · tuberculosis, a positive skin test for tuberculosis or have recently been in close contact with someone who has tuberculosis  · an unusual or allergic reaction vein or for injection under the skin. It is usually given by a health care professional in a hospital or clinic setting. If you get this medicine at home, you will be taught how to prepare and give this medicine. Use exactly as directed.  Take your medicine system  · ofatumumab  · omeprazole  · oral contraceptives  · rituximab  · simvastatin  · steroid medicines like prednisone or cortisone  · theophylline  · tositumomab  · vaccines  · warfarin  What if I miss a dose? It is important not to miss your dose.  C tuberculosis (TB) before you start this medicine. If your doctor prescribes any medicine for TB, you should start taking the TB medicine before starting this medicine. Make sure to finish the full course of TB medicine.   Talk to your doctor about your risk

## 2018-08-22 ENCOUNTER — PRIOR ORIGINAL RECORDS (OUTPATIENT)
Dept: OTHER | Age: 68
End: 2018-08-22

## 2018-08-28 ENCOUNTER — TELEPHONE (OUTPATIENT)
Dept: RHEUMATOLOGY | Facility: CLINIC | Age: 68
End: 2018-08-28

## 2018-08-28 NOTE — TELEPHONE ENCOUNTER
Per pharmacy on file need to verify dosage of DULoxetine HCl 60 MG Oral Cap DR Particles due to pt usually had a low dose before,  Pls advise.

## 2018-08-28 NOTE — TELEPHONE ENCOUNTER
Spoke with Ermelinda and advised pt to take duloxetine 60 mg daily. Summary:  1. Cont. Duloxetine - 60mg ad ay   2. Refill norco 10/325mg 2 every 8 hourse #180 -   3. Return to clinic in 3 months.    4.  tocalizumab - consider this - info given on this -

## 2018-09-17 ENCOUNTER — MYAURORA ACCOUNT LINK (OUTPATIENT)
Dept: OTHER | Age: 68
End: 2018-09-17

## 2018-09-19 ENCOUNTER — PRIOR ORIGINAL RECORDS (OUTPATIENT)
Dept: OTHER | Age: 68
End: 2018-09-19

## 2018-09-26 ENCOUNTER — PRIOR ORIGINAL RECORDS (OUTPATIENT)
Dept: OTHER | Age: 68
End: 2018-09-26

## 2018-10-01 ENCOUNTER — TELEPHONE (OUTPATIENT)
Dept: RHEUMATOLOGY | Facility: CLINIC | Age: 68
End: 2018-10-01

## 2018-10-01 NOTE — TELEPHONE ENCOUNTER
Tay from [facility] is calling to verify medication refill on       Current Outpatient Medications:  HYDROcodone-acetaminophen  MG Oral Tab Take 2 tablets by mouth every 8 (eight) hours as needed for Pain. Disp: 180 tablet Rfl: 0     Please Advise.

## 2018-10-11 ENCOUNTER — PRIOR ORIGINAL RECORDS (OUTPATIENT)
Dept: OTHER | Age: 68
End: 2018-10-11

## 2018-10-16 ENCOUNTER — PRIOR ORIGINAL RECORDS (OUTPATIENT)
Dept: OTHER | Age: 68
End: 2018-10-16

## 2018-10-16 NOTE — H&P
[facility]    Cardiac Electrophysiology H&P Addendum    Sebas [facility] Suit[facility]   [facility] [id number] MRN [id number]   Admission Date 10/26/2018 Procedure Date 10/26/2018   Attending Physician Tavares Leon MD Procedure [facility]

## 2018-10-17 ENCOUNTER — TELEPHONE (OUTPATIENT)
Dept: RHEUMATOLOGY | Facility: CLINIC | Age: 68
End: 2018-10-17

## 2018-10-17 RX ORDER — DULOXETIN HYDROCHLORIDE 60 MG/1
60 CAPSULE, DELAYED RELEASE ORAL 2 TIMES DAILY
Qty: 180 CAPSULE | Refills: 1 | Status: SHIPPED | OUTPATIENT
Start: 2018-10-17 | End: 2019-04-12

## 2018-10-17 NOTE — TELEPHONE ENCOUNTER
Patient states that RX for     DULoxetine HCl 60 MG Oral Cap DR Particles Take 1 capsule (60 mg total) by mouth daily.  Disp: 90 capsule Rfl: 1     Patient states medication was increased to 60 MG and states should be twice per day instead of increase once

## 2018-10-17 NOTE — TELEPHONE ENCOUNTER
Talked to pt. - he's walking with therapy   He's not using a cane or walker . hes' on dulxoetine 60mg bid and wants a refill on this   Sent this in.

## 2018-10-17 NOTE — TELEPHONE ENCOUNTER
Please see below. Pt states he was advised to take duloxetine 60mg BID at [address] [place] [address].      Per Dr. Victorina Carvalho note on 8/10:   \"Will try to increase duloxetine to 30mg bid and try to taper norco on next visit\"    Current duloxetine script:   \"Take 1 capsule (60 mg

## 2018-10-17 NOTE — TELEPHONE ENCOUNTER
D/w pt. - he has been taking dulxoetine 60mg bid and doing great - no side effects  He is still taking the norco - b/c of his knee pain. He's taking it 2 tablets tid. Will send in duloxetine.

## 2018-10-22 ENCOUNTER — LAB ENCOUNTER (OUTPATIENT)
Dept: LAB | Facility: HOSPITAL | Age: 68
End: 2018-10-22
Attending: INTERNAL MEDICINE
Payer: MEDICARE

## 2018-10-22 ENCOUNTER — PRIOR ORIGINAL RECORDS (OUTPATIENT)
Dept: OTHER | Age: 68
End: 2018-10-22

## 2018-10-22 ENCOUNTER — HOSPITAL ENCOUNTER (OUTPATIENT)
Dept: GENERAL RADIOLOGY | Facility: HOSPITAL | Age: 68
Discharge: HOME | End: 2018-10-22
Attending: INTERNAL MEDICINE
Payer: MEDICARE

## 2018-10-22 DIAGNOSIS — Z95.0 PACEMAKER: ICD-10-CM

## 2018-10-22 DIAGNOSIS — Z01.810 PRE-OPERATIVE CARDIOVASCULAR EXAMINATION: ICD-10-CM

## 2018-10-22 DIAGNOSIS — I48.20 CHRONIC ATRIAL FIBRILLATION (HCC): ICD-10-CM

## 2018-10-22 DIAGNOSIS — I50.20 SYSTOLIC HEART FAILURE, UNSPECIFIED HF CHRONICITY (HCC): ICD-10-CM

## 2018-10-22 DIAGNOSIS — I44.7 LBBB (LEFT BUNDLE BRANCH BLOCK): ICD-10-CM

## 2018-10-22 PROCEDURE — 36415 COLL VENOUS BLD VENIPUNCTURE: CPT

## 2018-10-22 PROCEDURE — 71046 X-RAY EXAM CHEST 2 VIEWS: CPT | Performed by: INTERNAL MEDICINE

## 2018-10-22 PROCEDURE — 85025 COMPLETE CBC W/AUTO DIFF WBC: CPT

## 2018-10-22 PROCEDURE — 80048 BASIC METABOLIC PNL TOTAL CA: CPT

## 2018-10-22 PROCEDURE — 87641 MR-STAPH DNA AMP PROBE: CPT

## 2018-10-23 ENCOUNTER — PRIOR ORIGINAL RECORDS (OUTPATIENT)
Dept: OTHER | Age: 68
End: 2018-10-23

## 2018-10-23 LAB
BUN: 22 MG/DL
CALCIUM: 8.9 MG/DL
CHLORIDE: 103 MEQ/L
CREATININE, SERUM: 1.23 MG/DL
GLUCOSE: 207 MG/DL
HEMATOCRIT: 40 %
HEMOGLOBIN: 13.4 G/DL
PLATELETS: 141 K/UL
POTASSIUM, SERUM: 3.7 MEQ/L
RED BLOOD COUNT: 4.24 X 10-6/U
SODIUM: 141 MEQ/L
WHITE BLOOD COUNT: 8.1 X 10-3/U

## 2018-10-26 ENCOUNTER — ANESTHESIA EVENT (OUTPATIENT)
Dept: INTERVENTIONAL RADIOLOGY/VASCULAR | Facility: HOSPITAL | Age: 68
End: 2018-10-26
Payer: MEDICARE

## 2018-10-26 ENCOUNTER — HOSPITAL ENCOUNTER (OUTPATIENT)
Dept: INTERVENTIONAL RADIOLOGY/VASCULAR | Facility: HOSPITAL | Age: 68
Discharge: HOME | End: 2018-10-27
Attending: INTERNAL MEDICINE | Admitting: INTERNAL MEDICINE
Payer: MEDICARE

## 2018-10-26 DIAGNOSIS — I44.7 LBBB (LEFT BUNDLE BRANCH BLOCK): ICD-10-CM

## 2018-10-26 DIAGNOSIS — I48.91 A-FIB (HCC): ICD-10-CM

## 2018-10-26 DIAGNOSIS — I50.9 HF (HEART FAILURE) (HCC): ICD-10-CM

## 2018-10-26 DIAGNOSIS — Z95.0 PACEMAKER: ICD-10-CM

## 2018-10-26 PROCEDURE — 82962 GLUCOSE BLOOD TEST: CPT

## 2018-10-26 PROCEDURE — 36005 INJECTION EXT VENOGRAPHY: CPT

## 2018-10-26 PROCEDURE — 0JH607Z INSERTION OF CARDIAC RESYNCHRONIZATION PACEMAKER PULSE GENERATOR INTO CHEST SUBCUTANEOUS TISSUE AND FASCIA, OPEN APPROACH: ICD-10-PCS | Performed by: INTERNAL MEDICINE

## 2018-10-26 PROCEDURE — 75820 VEIN X-RAY ARM/LEG: CPT

## 2018-10-26 PROCEDURE — 85610 PROTHROMBIN TIME: CPT | Performed by: INTERNAL MEDICINE

## 2018-10-26 PROCEDURE — 33229 REMV&REPLC PM GEN MULT LEADS: CPT

## 2018-10-26 PROCEDURE — 33225 L VENTRIC PACING LEAD ADD-ON: CPT

## 2018-10-26 PROCEDURE — 96361 HYDRATE IV INFUSION ADD-ON: CPT

## 2018-10-26 PROCEDURE — 0JPT0PZ REMOVAL OF CARDIAC RHYTHM RELATED DEVICE FROM TRUNK SUBCUTANEOUS TISSUE AND FASCIA, OPEN APPROACH: ICD-10-PCS | Performed by: INTERNAL MEDICINE

## 2018-10-26 PROCEDURE — 02H43JZ INSERTION OF PACEMAKER LEAD INTO CORONARY VEIN, PERCUTANEOUS APPROACH: ICD-10-PCS | Performed by: INTERNAL MEDICINE

## 2018-10-26 PROCEDURE — 96365 THER/PROPH/DIAG IV INF INIT: CPT

## 2018-10-26 PROCEDURE — 36415 COLL VENOUS BLD VENIPUNCTURE: CPT

## 2018-10-26 RX ORDER — ONDANSETRON 2 MG/ML
INJECTION INTRAMUSCULAR; INTRAVENOUS AS NEEDED
Status: DISCONTINUED | OUTPATIENT
Start: 2018-10-26 | End: 2018-10-26 | Stop reason: SURG

## 2018-10-26 RX ORDER — SODIUM CHLORIDE 9 MG/ML
INJECTION, SOLUTION INTRAVENOUS CONTINUOUS
Status: DISCONTINUED | OUTPATIENT
Start: 2018-10-26 | End: 2018-10-27

## 2018-10-26 RX ORDER — HYDROCODONE BITARTRATE AND ACETAMINOPHEN 5; 325 MG/1; MG/1
2 TABLET ORAL AS NEEDED
Status: DISCONTINUED | OUTPATIENT
Start: 2018-10-26 | End: 2018-10-26 | Stop reason: HOSPADM

## 2018-10-26 RX ORDER — CEFAZOLIN SODIUM/WATER 2 G/20 ML
SYRINGE (ML) INTRAVENOUS AS NEEDED
Status: DISCONTINUED | OUTPATIENT
Start: 2018-10-26 | End: 2018-10-26 | Stop reason: SURG

## 2018-10-26 RX ORDER — CEFAZOLIN SODIUM/WATER 2 G/20 ML
SYRINGE (ML) INTRAVENOUS
Status: COMPLETED
Start: 2018-10-26 | End: 2018-10-26

## 2018-10-26 RX ORDER — SODIUM CHLORIDE, SODIUM LACTATE, POTASSIUM CHLORIDE, CALCIUM CHLORIDE 600; 310; 30; 20 MG/100ML; MG/100ML; MG/100ML; MG/100ML
INJECTION, SOLUTION INTRAVENOUS CONTINUOUS
Status: DISCONTINUED | OUTPATIENT
Start: 2018-10-26 | End: 2018-10-26 | Stop reason: HOSPADM

## 2018-10-26 RX ORDER — MORPHINE SULFATE 10 MG/ML
6 INJECTION, SOLUTION INTRAMUSCULAR; INTRAVENOUS EVERY 10 MIN PRN
Status: DISCONTINUED | OUTPATIENT
Start: 2018-10-26 | End: 2018-10-26 | Stop reason: HOSPADM

## 2018-10-26 RX ORDER — DULOXETIN HYDROCHLORIDE 30 MG/1
60 CAPSULE, DELAYED RELEASE ORAL 2 TIMES DAILY
Status: DISCONTINUED | OUTPATIENT
Start: 2018-10-26 | End: 2018-10-27

## 2018-10-26 RX ORDER — MIDAZOLAM HYDROCHLORIDE 1 MG/ML
INJECTION INTRAMUSCULAR; INTRAVENOUS AS NEEDED
Status: DISCONTINUED | OUTPATIENT
Start: 2018-10-26 | End: 2018-10-26 | Stop reason: SURG

## 2018-10-26 RX ORDER — LIDOCAINE HYDROCHLORIDE 10 MG/ML
INJECTION, SOLUTION EPIDURAL; INFILTRATION; INTRACAUDAL; PERINEURAL AS NEEDED
Status: DISCONTINUED | OUTPATIENT
Start: 2018-10-26 | End: 2018-10-26 | Stop reason: SURG

## 2018-10-26 RX ORDER — FAMOTIDINE 20 MG/1
20 TABLET ORAL DAILY
Status: DISCONTINUED | OUTPATIENT
Start: 2018-10-26 | End: 2018-10-27

## 2018-10-26 RX ORDER — MORPHINE SULFATE 4 MG/ML
2 INJECTION, SOLUTION INTRAMUSCULAR; INTRAVENOUS EVERY 10 MIN PRN
Status: DISCONTINUED | OUTPATIENT
Start: 2018-10-26 | End: 2018-10-26 | Stop reason: HOSPADM

## 2018-10-26 RX ORDER — ACETAMINOPHEN 325 MG/1
650 TABLET ORAL EVERY 4 HOURS PRN
Status: DISCONTINUED | OUTPATIENT
Start: 2018-10-26 | End: 2018-10-27

## 2018-10-26 RX ORDER — HYDROCODONE BITARTRATE AND ACETAMINOPHEN 5; 325 MG/1; MG/1
1 TABLET ORAL AS NEEDED
Status: DISCONTINUED | OUTPATIENT
Start: 2018-10-26 | End: 2018-10-26 | Stop reason: HOSPADM

## 2018-10-26 RX ORDER — DEXTROSE MONOHYDRATE 25 G/50ML
50 INJECTION, SOLUTION INTRAVENOUS
Status: DISCONTINUED | OUTPATIENT
Start: 2018-10-26 | End: 2018-10-26 | Stop reason: HOSPADM

## 2018-10-26 RX ORDER — WARFARIN SODIUM 5 MG/1
5 TABLET ORAL NIGHTLY
Status: DISCONTINUED | OUTPATIENT
Start: 2018-10-26 | End: 2018-10-27

## 2018-10-26 RX ORDER — HALOPERIDOL 5 MG/ML
0.25 INJECTION INTRAMUSCULAR ONCE AS NEEDED
Status: DISCONTINUED | OUTPATIENT
Start: 2018-10-26 | End: 2018-10-26 | Stop reason: HOSPADM

## 2018-10-26 RX ORDER — HYDROCODONE BITARTRATE AND ACETAMINOPHEN 10; 325 MG/1; MG/1
2 TABLET ORAL EVERY 8 HOURS PRN
Status: DISCONTINUED | OUTPATIENT
Start: 2018-10-26 | End: 2018-10-27

## 2018-10-26 RX ORDER — NALOXONE HYDROCHLORIDE 0.4 MG/ML
80 INJECTION, SOLUTION INTRAMUSCULAR; INTRAVENOUS; SUBCUTANEOUS AS NEEDED
Status: DISCONTINUED | OUTPATIENT
Start: 2018-10-26 | End: 2018-10-26 | Stop reason: HOSPADM

## 2018-10-26 RX ORDER — SODIUM CHLORIDE 0.9 % (FLUSH) 0.9 %
10 SYRINGE (ML) INJECTION AS NEEDED
Status: DISCONTINUED | OUTPATIENT
Start: 2018-10-26 | End: 2018-10-27

## 2018-10-26 RX ORDER — METOPROLOL SUCCINATE 50 MG/1
50 TABLET, EXTENDED RELEASE ORAL NIGHTLY
Status: DISCONTINUED | OUTPATIENT
Start: 2018-10-26 | End: 2018-10-27

## 2018-10-26 RX ORDER — CEFAZOLIN SODIUM/WATER 2 G/20 ML
2 SYRINGE (ML) INTRAVENOUS EVERY 8 HOURS
Status: COMPLETED | OUTPATIENT
Start: 2018-10-26 | End: 2018-10-27

## 2018-10-26 RX ORDER — BACITRACIN 50000 [USP'U]/1
INJECTION, POWDER, LYOPHILIZED, FOR SOLUTION INTRAMUSCULAR
Status: COMPLETED
Start: 2018-10-26 | End: 2018-10-26

## 2018-10-26 RX ORDER — ALFUZOSIN HYDROCHLORIDE 10 MG/1
10 TABLET, EXTENDED RELEASE ORAL
Status: DISCONTINUED | OUTPATIENT
Start: 2018-10-26 | End: 2018-10-27

## 2018-10-26 RX ORDER — ACETAMINOPHEN AND CODEINE PHOSPHATE 300; 30 MG/1; MG/1
2 TABLET ORAL EVERY 4 HOURS PRN
Status: DISCONTINUED | OUTPATIENT
Start: 2018-10-26 | End: 2018-10-26

## 2018-10-26 RX ORDER — ACETAMINOPHEN AND CODEINE PHOSPHATE 300; 30 MG/1; MG/1
1 TABLET ORAL EVERY 4 HOURS PRN
Status: DISCONTINUED | OUTPATIENT
Start: 2018-10-26 | End: 2018-10-26

## 2018-10-26 RX ORDER — LISINOPRIL AND HYDROCHLOROTHIAZIDE 20; 12.5 MG/1; MG/1
1 TABLET ORAL DAILY
Status: DISCONTINUED | OUTPATIENT
Start: 2018-10-26 | End: 2018-10-26

## 2018-10-26 RX ORDER — MORPHINE SULFATE 4 MG/ML
4 INJECTION, SOLUTION INTRAMUSCULAR; INTRAVENOUS EVERY 10 MIN PRN
Status: DISCONTINUED | OUTPATIENT
Start: 2018-10-26 | End: 2018-10-26 | Stop reason: HOSPADM

## 2018-10-26 RX ORDER — SODIUM CHLORIDE, SODIUM LACTATE, POTASSIUM CHLORIDE, CALCIUM CHLORIDE 600; 310; 30; 20 MG/100ML; MG/100ML; MG/100ML; MG/100ML
INJECTION, SOLUTION INTRAVENOUS CONTINUOUS PRN
Status: DISCONTINUED | OUTPATIENT
Start: 2018-10-26 | End: 2018-10-26

## 2018-10-26 RX ORDER — LIDOCAINE HYDROCHLORIDE AND EPINEPHRINE 10; 10 MG/ML; UG/ML
INJECTION, SOLUTION INFILTRATION; PERINEURAL
Status: COMPLETED
Start: 2018-10-26 | End: 2018-10-26

## 2018-10-26 RX ORDER — METOPROLOL TARTRATE 5 MG/5ML
2.5 INJECTION INTRAVENOUS ONCE
Status: DISCONTINUED | OUTPATIENT
Start: 2018-10-26 | End: 2018-10-26 | Stop reason: HOSPADM

## 2018-10-26 RX ORDER — ONDANSETRON 2 MG/ML
4 INJECTION INTRAMUSCULAR; INTRAVENOUS ONCE AS NEEDED
Status: DISCONTINUED | OUTPATIENT
Start: 2018-10-26 | End: 2018-10-26 | Stop reason: HOSPADM

## 2018-10-26 RX ORDER — SODIUM CHLORIDE 9 MG/ML
INJECTION, SOLUTION INTRAVENOUS
Status: COMPLETED
Start: 2018-10-26 | End: 2018-10-26

## 2018-10-26 RX ADMIN — HYDROCODONE BITARTRATE AND ACETAMINOPHEN 2 TABLET: 10; 325 TABLET ORAL at 21:57:00

## 2018-10-26 RX ADMIN — CEFAZOLIN SODIUM/WATER 2 G: 2 G/20 ML SYRINGE (ML) INTRAVENOUS at 17:31:00

## 2018-10-26 RX ADMIN — SODIUM CHLORIDE: 9 INJECTION, SOLUTION INTRAVENOUS at 11:18:00

## 2018-10-26 RX ADMIN — DULOXETIN HYDROCHLORIDE 60 MG: 30 CAPSULE, DELAYED RELEASE ORAL at 20:38:00

## 2018-10-26 RX ADMIN — METOPROLOL SUCCINATE 50 MG: 50 TABLET, EXTENDED RELEASE ORAL at 20:38:00

## 2018-10-26 RX ADMIN — ALFUZOSIN HYDROCHLORIDE 10 MG: 10 TABLET, EXTENDED RELEASE ORAL at 20:38:00

## 2018-10-26 RX ADMIN — LIDOCAINE HYDROCHLORIDE 50 MG: 10 INJECTION, SOLUTION EPIDURAL; INFILTRATION; INTRACAUDAL; PERINEURAL at 09:54:00

## 2018-10-26 RX ADMIN — SODIUM CHLORIDE: 9 INJECTION, SOLUTION INTRAVENOUS at 09:48:00

## 2018-10-26 RX ADMIN — MIDAZOLAM HYDROCHLORIDE 2 MG: 1 INJECTION INTRAMUSCULAR; INTRAVENOUS at 09:46:00

## 2018-10-26 RX ADMIN — ONDANSETRON 4 MG: 2 INJECTION INTRAMUSCULAR; INTRAVENOUS at 11:01:00

## 2018-10-26 RX ADMIN — CEFAZOLIN SODIUM/WATER 2 G: 2 G/20 ML SYRINGE (ML) INTRAVENOUS at 10:02:00

## 2018-10-26 NOTE — ANESTHESIA POSTPROCEDURE EVALUATION
Patient: Justyna Alvarado    Procedure Summary     Date:  10/26/18 Room / Location:  [place] [address].    Anesthesia Start:  8592 Anesthesia Stop:  7418    Procedure:  EP BI-VENT PERMANENT PACEMAKER Diagnosis:       LBBB (left bundl

## 2018-10-26 NOTE — ANESTHESIA PROCEDURE NOTES
ANESTHESIA INTUBATION  Date/Time: 10/26/2018 9:48 AM  Urgency: elective    Airway not difficult    General Information and Staff    Patient location during procedure: OR  Anesthesiologist: Jonny Nunez MD  Resident/CRNA: Juan Grant, CRNA

## 2018-10-26 NOTE — ANESTHESIA PREPROCEDURE EVALUATION
Anesthesia PreOp Note    HPI:     Kwaku Novak is a 70year old male who presents for preoperative consultation requested by: * No surgeons listed *    Date of Surgery: 10/26/2018    * No procedures listed *  Indication: * No pre-op diagnosis entered Warfarin Sodium 5 MG Oral Tab Take 1 to 1.5 tabs daily as directed by [facility] anticoagulation clinic Disp:  Rfl:  Past Week at Unknown time   Metoprolol Succinate ER 50 MG Oral Tablet 24 Hr 50 mg nightly.  Disp:  Rfl:  10/25/2018 at Unknown time   Lisinopril Needle (PEN NEEDLES 3/16\") 31G X 5 MM Does not apply Misc Use three times daily with insulin injections.  Disp:  Rfl:  Unknown at Unknown time   ULTICARE MINI PEN NEEDLES 31G X 6 MM Does not apply Misc  Disp:  Rfl:  Unknown at Unknown time       Current Fa Results   Component Value Date     10/22/2018    K 3.7 10/22/2018     10/22/2018    CO2 29 10/22/2018    BUN 22 (H) 10/22/2018    CREATSERUM 1.23 10/22/2018     (H) 10/22/2018    PGLU 121 (H) 10/26/2018    CA 8.9 10/22/2018     Lab Resul

## 2018-10-26 NOTE — PROGRESS NOTES
Pt informed RN that he took his prescribed Norco from home. RN instructed pt and wife not to take medication brought in from home. Pt and wife verbalized understanding. Wife asked to take any medication home.

## 2018-10-27 ENCOUNTER — APPOINTMENT (OUTPATIENT)
Dept: GENERAL RADIOLOGY | Facility: HOSPITAL | Age: 68
End: 2018-10-27
Attending: INTERNAL MEDICINE
Payer: MEDICARE

## 2018-10-27 VITALS
WEIGHT: 236.31 LBS | SYSTOLIC BLOOD PRESSURE: 142 MMHG | TEMPERATURE: 98 F | RESPIRATION RATE: 18 BRPM | HEART RATE: 70 BPM | HEIGHT: 72 IN | BODY MASS INDEX: 32.01 KG/M2 | DIASTOLIC BLOOD PRESSURE: 91 MMHG | OXYGEN SATURATION: 91 %

## 2018-10-27 PROCEDURE — A4216 STERILE WATER/SALINE, 10 ML: HCPCS | Performed by: INTERNAL MEDICINE

## 2018-10-27 PROCEDURE — 71046 X-RAY EXAM CHEST 2 VIEWS: CPT | Performed by: INTERNAL MEDICINE

## 2018-10-27 PROCEDURE — 82962 GLUCOSE BLOOD TEST: CPT

## 2018-10-27 RX ORDER — ACETAMINOPHEN 325 MG/1
650 TABLET ORAL EVERY 4 HOURS PRN
Qty: 60 TABLET | Refills: 0 | Status: SHIPPED | OUTPATIENT
Start: 2018-10-27 | End: 2018-11-14

## 2018-10-27 RX ADMIN — CEFAZOLIN SODIUM/WATER 2 G: 2 G/20 ML SYRINGE (ML) INTRAVENOUS at 00:00:00

## 2018-10-27 RX ADMIN — DULOXETIN HYDROCHLORIDE 60 MG: 30 CAPSULE, DELAYED RELEASE ORAL at 08:30:00

## 2018-10-27 RX ADMIN — SODIUM CHLORIDE 0.9 % (FLUSH) 10 ML: 0.9 % SYRINGE (ML) INJECTION at 01:36:00

## 2018-10-27 NOTE — TRANSITION NOTE
[facility]    Cardiology Discharge Summary    Marin Sheikh Patient Status:  Outpatient in a Bed    7/10/1950 MRN [id number]   Location [facility] 3W/SW Attending No att. providers found   Hosp Day # 0 PCP Roya Dahl MD Take by mouth. Take 2 tabs in the AM   Refills:  0     LANTUS SOLOSTAR 100 UNIT/ML Sopn  Generic drug:  insulin glargine      50 Units every morning. Refills:  0     Lisinopril-Hydrochlorothiazide 20-12.5 MG Tabs      2 (two) times daily.    Refills:  0 18.0*   WBC  8.1   PLT  141       Lab Results   Component Value Date    AST 20 10/16/2015    ALT 25 10/16/2015    INR 1.3 (H) 10/26/2018    ESRML 41 (H) 10/18/2017    CRP 1.9 (H) 10/18/2017       Abrahan Britt NP  10/27/2018

## 2018-10-27 NOTE — PROGRESS NOTES
[facility] - [facility]    Cardiology Progress Note    Bailey Parent Patient Status:  Outpatient in a Bed    7/10/1950 MRN [id number]   Location [facility] 3W/SW Attending Syed Rivers MD   Hosp Day # 0 PCP Rosa Denny MD     Primary 8.1 10/22/2018    HGB 13.4 (L) 10/22/2018    HCT 40.0 (L) 10/22/2018     10/22/2018    CREATSERUM 1.23 10/22/2018    BUN 22 (H) 10/22/2018     10/22/2018    K 3.7 10/22/2018     10/22/2018    CO2 29 10/22/2018     (H) 10/22/2018

## 2018-10-29 ENCOUNTER — TELEPHONE (OUTPATIENT)
Dept: CARDIOLOGY UNIT | Facility: HOSPITAL | Age: 68
End: 2018-10-29

## 2018-10-29 ENCOUNTER — TELEPHONE (OUTPATIENT)
Dept: RHEUMATOLOGY | Facility: CLINIC | Age: 68
End: 2018-10-29

## 2018-10-29 NOTE — TELEPHONE ENCOUNTER
Contacted [facility]'s pharmacist Tay to confirm pt did receive [place] script from Dr. Joselyn Marshall dated 8/30/2018. Diagnosis verified.

## 2018-10-29 NOTE — TELEPHONE ENCOUNTER
Per pharmacy/Tay they states pt brought a script for norco that was written out in August. Pharmacist is calling to verify script.

## 2018-10-30 NOTE — OPERATIVE REPORT
[facility]    Cardiac Electrophysiology Operative Note    [facility]   [facility] [id number] MRN [id number]   Admission Date 10/26/2018 Procedure Date 10/30/2018   Attending Physician No att. providers found Procedu soft-tipped wire was advanced into this, and the LV lead over this with good stability and pacing threshold. The sheaths were split, and the lead secured to the pectoralis muscle using 3 separate ties of 0-ethibond suture and the collar.   10 V output produ

## 2018-11-05 ENCOUNTER — PRIOR ORIGINAL RECORDS (OUTPATIENT)
Dept: OTHER | Age: 68
End: 2018-11-05

## 2018-11-07 ENCOUNTER — OFFICE VISIT (OUTPATIENT)
Dept: PULMONOLOGY | Facility: CLINIC | Age: 68
End: 2018-11-07
Payer: MEDICARE

## 2018-11-07 VITALS
BODY MASS INDEX: 35.46 KG/M2 | HEIGHT: 72 IN | OXYGEN SATURATION: 95 % | RESPIRATION RATE: 18 BRPM | SYSTOLIC BLOOD PRESSURE: 160 MMHG | WEIGHT: 261.81 LBS | DIASTOLIC BLOOD PRESSURE: 71 MMHG | HEART RATE: 70 BPM

## 2018-11-07 DIAGNOSIS — J44.9 CHRONIC OBSTRUCTIVE PULMONARY DISEASE, UNSPECIFIED COPD TYPE (HCC): Primary | ICD-10-CM

## 2018-11-07 DIAGNOSIS — Z99.89 OSA ON CPAP: ICD-10-CM

## 2018-11-07 DIAGNOSIS — R91.1 LUNG NODULE: ICD-10-CM

## 2018-11-07 DIAGNOSIS — G47.33 OSA ON CPAP: ICD-10-CM

## 2018-11-07 PROCEDURE — 99214 OFFICE O/P EST MOD 30 MIN: CPT | Performed by: INTERNAL MEDICINE

## 2018-11-07 PROCEDURE — G0463 HOSPITAL OUTPT CLINIC VISIT: HCPCS | Performed by: INTERNAL MEDICINE

## 2018-11-07 NOTE — PROGRESS NOTES
HPI:    Patient ID: Schuyler Morgan is a 70year old male.     HPI    Doing very well   Had KESSLER / much better after he had the pacer   No cough or sputum or wheezes   Never used old prn inhaler  No dyspnea at rest   Mild on exertion   Doing very well on 1200 MG Oral Cap Take  by mouth daily. Disp:  Rfl:    Coenzyme Q10 (COQ10) 200 MG Oral Cap Take 200 mg by mouth daily. Disp:  Rfl:    Cholecalciferol (VITAMIN D3) 2000 UNITS Oral Tab Take by mouth.  Take 3 tabs daily  Disp:  Rfl:    Cyanocobalamin (B-12) small LLL / pleural based 7-8 mm lung nodule seen on chest ct in  June/2018      F/u ct     F/u in 6 months           Meds This Visit:  Requested Prescriptions      No prescriptions requested or ordered in this encounter       Imaging & Referrals:  [facility]

## 2018-11-14 ENCOUNTER — OFFICE VISIT (OUTPATIENT)
Dept: RHEUMATOLOGY | Facility: CLINIC | Age: 68
End: 2018-11-14
Payer: MEDICARE

## 2018-11-14 VITALS
HEIGHT: 73 IN | BODY MASS INDEX: 33.89 KG/M2 | DIASTOLIC BLOOD PRESSURE: 81 MMHG | SYSTOLIC BLOOD PRESSURE: 160 MMHG | WEIGHT: 255.69 LBS | HEART RATE: 71 BPM

## 2018-11-14 DIAGNOSIS — M15.9 OSTEOARTHRITIS OF MULTIPLE JOINTS, UNSPECIFIED OSTEOARTHRITIS TYPE: ICD-10-CM

## 2018-11-14 DIAGNOSIS — M79.10 MYALGIA: ICD-10-CM

## 2018-11-14 DIAGNOSIS — M35.3 PMR (POLYMYALGIA RHEUMATICA) (HCC): Primary | ICD-10-CM

## 2018-11-14 DIAGNOSIS — R70.0 ELEVATED SED RATE: ICD-10-CM

## 2018-11-14 PROCEDURE — 99214 OFFICE O/P EST MOD 30 MIN: CPT | Performed by: INTERNAL MEDICINE

## 2018-11-14 PROCEDURE — G0463 HOSPITAL OUTPT CLINIC VISIT: HCPCS | Performed by: INTERNAL MEDICINE

## 2018-11-14 RX ORDER — HYDROCODONE BITARTRATE AND ACETAMINOPHEN 10; 325 MG/1; MG/1
TABLET ORAL AS NEEDED
Refills: 0 | COMMUNITY
Start: 2018-10-31 | End: 2018-11-14

## 2018-11-14 RX ORDER — HYDROCODONE BITARTRATE AND ACETAMINOPHEN 10; 325 MG/1; MG/1
2 TABLET ORAL EVERY 8 HOURS PRN
Qty: 180 TABLET | Refills: 0 | Status: SHIPPED | OUTPATIENT
Start: 2019-01-13 | End: 2019-02-12

## 2018-11-14 RX ORDER — HYDROCODONE BITARTRATE AND ACETAMINOPHEN 10; 325 MG/1; MG/1
2 TABLET ORAL EVERY 8 HOURS PRN
Qty: 180 TABLET | Refills: 0 | Status: SHIPPED | OUTPATIENT
Start: 2018-11-14 | End: 2018-12-14

## 2018-11-14 RX ORDER — HYDROCODONE BITARTRATE AND ACETAMINOPHEN 10; 325 MG/1; MG/1
2 TABLET ORAL EVERY 8 HOURS PRN
Qty: 180 TABLET | Refills: 0 | Status: SHIPPED | OUTPATIENT
Start: 2018-12-14 | End: 2019-01-13

## 2018-11-14 NOTE — PATIENT INSTRUCTIONS
1. Cont. Duloxetine - 60mg ad ay twice a day   2. Cont.  norco 10/325mg 2 every 8 hourse #180 - with 2 refills   3. Return to clinic in 3 months. 4. Cont.  Physical therapy -

## 2018-11-14 NOTE — PROGRESS NOTES
Clif Rivera is a 70year old male who presents for Patient presents with:  Osteoarthritis  PMR  Knee Pain: right  . HPI:     He is a pleasant 75 year who has elevated sed rate, PMR and fibromyalgia with osteoarthirtis chronic pain.  He is here on He had an accident in his back yard - he had right knee meniscal tear and he hurt his neck. He couln't move his head. He went to see Dr. Jennifer Linton and he couldn't walk on his knee. He was sent to ortho.  He had to go back up on his norco b/c he needed more FOLIC ACID 1 MG Oral Tab TAKE TWO TABLETS BY MOUTH ONCE DAILY Disp: 180 tablet Rfl: 3   Insulin Pen Needle (PEN NEEDLES 3/16\") 31G X 5 MM Does not apply Misc Use three times daily with insulin injections.  Disp:  Rfl:    Warfarin Sodium 5 MG Oral Tab Take • Type II or unspecified type diabetes mellitus without mention of complication, not stated as uncontrolled       Past Surgical History:   Procedure Laterality Date   • APPENDECTOMY  05858   • CONTACT LASER SURGERY OF PROSTATE  2012   • HERNIA SURGERY  200 si joint film - 4/16/15 - . Mild degenerative change both sacroiliac joints. 2. Mild to moderate degenerative arthritis lower lumbar spine. 6/12/2015 - hip xrays - b/l  1. Minimal degenerative change both hips.   2. Moderate degenerative arthritis lower - in the past -  had a long discussion about his care - d/w him that rheumatology wise not able to treat his inflammation - he should seek 2nd opioin. He feels norco iscontrolling his pain.  e states he has been on norco for 5 years taking 2 at night only n

## 2018-11-27 ENCOUNTER — HOSPITAL ENCOUNTER (OUTPATIENT)
Dept: CT IMAGING | Facility: HOSPITAL | Age: 68
Discharge: HOME | End: 2018-11-27
Attending: INTERNAL MEDICINE
Payer: MEDICARE

## 2018-11-27 DIAGNOSIS — R91.1 LUNG NODULE: ICD-10-CM

## 2018-11-27 PROCEDURE — 71250 CT THORAX DX C-: CPT | Performed by: INTERNAL MEDICINE

## 2018-11-28 ENCOUNTER — MYAURORA ACCOUNT LINK (OUTPATIENT)
Dept: OTHER | Age: 68
End: 2018-11-28

## 2018-12-11 ENCOUNTER — MYAURORA ACCOUNT LINK (OUTPATIENT)
Dept: OTHER | Age: 68
End: 2018-12-11

## 2018-12-11 ENCOUNTER — PRIOR ORIGINAL RECORDS (OUTPATIENT)
Dept: OTHER | Age: 68
End: 2018-12-11

## 2019-01-01 ENCOUNTER — EXTERNAL RECORD (OUTPATIENT)
Dept: HEALTH INFORMATION MANAGEMENT | Facility: OTHER | Age: 69
End: 2019-01-01

## 2019-01-02 ENCOUNTER — ORDER TRANSCRIPTION (OUTPATIENT)
Dept: ADMINISTRATIVE | Facility: HOSPITAL | Age: 69
End: 2019-01-02

## 2019-01-02 DIAGNOSIS — E04.2 MULTINODULAR GOITER: Primary | ICD-10-CM

## 2019-01-14 ENCOUNTER — HOSPITAL ENCOUNTER (OUTPATIENT)
Dept: ULTRASOUND IMAGING | Facility: HOSPITAL | Age: 69
Discharge: HOME | End: 2019-01-14
Attending: INTERNAL MEDICINE
Payer: MEDICARE

## 2019-01-14 VITALS
BODY MASS INDEX: 35.89 KG/M2 | SYSTOLIC BLOOD PRESSURE: 168 MMHG | HEIGHT: 72 IN | RESPIRATION RATE: 16 BRPM | HEART RATE: 71 BPM | WEIGHT: 265 LBS | DIASTOLIC BLOOD PRESSURE: 79 MMHG | OXYGEN SATURATION: 99 %

## 2019-01-14 DIAGNOSIS — E04.2 MULTINODULAR GOITER: ICD-10-CM

## 2019-01-14 PROCEDURE — 88172 CYTP DX EVAL FNA 1ST EA SITE: CPT | Performed by: INTERNAL MEDICINE

## 2019-01-14 PROCEDURE — 88173 CYTOPATH EVAL FNA REPORT: CPT | Performed by: INTERNAL MEDICINE

## 2019-01-14 PROCEDURE — 10005 FNA BX W/US GDN 1ST LES: CPT | Performed by: INTERNAL MEDICINE

## 2019-01-14 PROCEDURE — 88177 CYTP FNA EVAL EA ADDL: CPT | Performed by: INTERNAL MEDICINE

## 2019-01-14 NOTE — PROCEDURES
[facility]  Procedure Note    Afshan Araceli Patient Status:  Outpatient    7/10/1950 MRN [id number]   Location [address] Attending Ruben Allan MD   Hosp Day # 0 PCP Delfino Reyes MD     Procedure: u

## 2019-01-14 NOTE — IMAGING NOTE
1513 Pt arrived to ultrasound room #4 scans taken by RAVEN  ultrasound technologist      Procedure explained questions answered      1523 Consent verified and obtained      6167   scans completed by Dr. Kelvin Paredes    [id number]  Time out taken     [phone]  area cl

## 2019-02-13 ENCOUNTER — OFFICE VISIT (OUTPATIENT)
Dept: RHEUMATOLOGY | Facility: CLINIC | Age: 69
End: 2019-02-13
Payer: MEDICARE

## 2019-02-13 VITALS
SYSTOLIC BLOOD PRESSURE: 177 MMHG | HEIGHT: 73 IN | DIASTOLIC BLOOD PRESSURE: 78 MMHG | WEIGHT: 267 LBS | HEART RATE: 70 BPM | BODY MASS INDEX: 35.39 KG/M2

## 2019-02-13 DIAGNOSIS — M35.3 PMR (POLYMYALGIA RHEUMATICA) (HCC): Primary | ICD-10-CM

## 2019-02-13 DIAGNOSIS — Z51.81 THERAPEUTIC DRUG MONITORING: ICD-10-CM

## 2019-02-13 PROCEDURE — G0463 HOSPITAL OUTPT CLINIC VISIT: HCPCS | Performed by: INTERNAL MEDICINE

## 2019-02-13 PROCEDURE — 99214 OFFICE O/P EST MOD 30 MIN: CPT | Performed by: INTERNAL MEDICINE

## 2019-02-13 RX ORDER — HYDROCODONE BITARTRATE AND ACETAMINOPHEN 10; 325 MG/1; MG/1
2 TABLET ORAL EVERY 8 HOURS PRN
Qty: 180 TABLET | Refills: 0 | Status: SHIPPED | OUTPATIENT
Start: 2019-03-15 | End: 2019-04-14

## 2019-02-13 RX ORDER — HYDROCODONE BITARTRATE AND ACETAMINOPHEN 10; 325 MG/1; MG/1
2 TABLET ORAL EVERY 8 HOURS PRN
Qty: 180 TABLET | Refills: 0 | Status: SHIPPED | OUTPATIENT
Start: 2019-02-13 | End: 2019-03-15

## 2019-02-13 RX ORDER — HYDROCODONE BITARTRATE AND ACETAMINOPHEN 10; 325 MG/1; MG/1
2 TABLET ORAL EVERY 8 HOURS PRN
Qty: 180 TABLET | Refills: 0 | Status: SHIPPED | OUTPATIENT
Start: 2019-04-14 | End: 2019-05-14

## 2019-02-13 NOTE — PROGRESS NOTES
Marin Sheikh is a 70year old male who presents for Patient presents with:  Osteoarthritis  Back Pain  Knee Pain: right  Shoulder Pain  .    HPI:     He is a pleasant 75 year who has elevated sed rate, PMR and fibromyalgia with osteoarthirtis chronic He had an accident in his back yard - he had right knee meniscal tear and he hurt his neck. He couln't move his head. He went to see Dr. Nirav Esquivel and he couldn't walk on his knee. He was sent to ortho.  He had to go back up on his norco b/c he needed more FOLIC ACID 1 MG Oral Tab TAKE TWO TABLETS BY MOUTH ONCE DAILY Disp: 180 tablet Rfl: 3   Glucose Blood (BLOOD GLUCOSE TEST) In Vitro Strip Test three times daily as directed.  Disp:  Rfl:    Insulin Pen Needle (PEN NEEDLES 3/16\") 31G X 5 MM Does not apply M • Type II or unspecified type diabetes mellitus without mention of complication, not stated as uncontrolled       Past Surgical History:   Procedure Laterality Date   • APPENDECTOMY  00693   • CONTACT LASER SURGERY OF PROSTATE  2012   • HERNIA SURGERY  200 si joint film - 4/16/15 - . Mild degenerative change both sacroiliac joints. 2. Mild to moderate degenerative arthritis lower lumbar spine. 6/12/2015 - hip xrays - b/l  1. Minimal degenerative change both hips.   2. Moderate degenerative arthritis lower - in the past -  had a long discussion about his care - d/w him that rheumatology wise not able to treat his inflammation - he should seek 2nd opioin. He feels norco iscontrolling his pain.  e states he has been on norco for 5 years taking 2 at night only n

## 2019-02-13 NOTE — PATIENT INSTRUCTIONS
1. Cont. Duloxetine - 60mg ad ay twice a day   2. Cont.  norco 10/325mg 2 every 8 hourse #180 - with 2 refills   3. Return to clinic in 3 months.    4. Plan to taper the afternoon to 1 tablet

## 2019-02-19 ENCOUNTER — PRIOR ORIGINAL RECORDS (OUTPATIENT)
Dept: OTHER | Age: 69
End: 2019-02-19

## 2019-02-28 VITALS
WEIGHT: 263 LBS | BODY MASS INDEX: 37.65 KG/M2 | DIASTOLIC BLOOD PRESSURE: 78 MMHG | SYSTOLIC BLOOD PRESSURE: 132 MMHG | HEIGHT: 70 IN | RESPIRATION RATE: 18 BRPM | HEART RATE: 72 BPM

## 2019-02-28 VITALS
DIASTOLIC BLOOD PRESSURE: 72 MMHG | BODY MASS INDEX: 37.51 KG/M2 | WEIGHT: 262 LBS | HEART RATE: 71 BPM | SYSTOLIC BLOOD PRESSURE: 132 MMHG | HEIGHT: 70 IN

## 2019-02-28 VITALS
DIASTOLIC BLOOD PRESSURE: 80 MMHG | WEIGHT: 264 LBS | SYSTOLIC BLOOD PRESSURE: 150 MMHG | RESPIRATION RATE: 16 BRPM | HEART RATE: 68 BPM

## 2019-02-28 VITALS
HEART RATE: 70 BPM | SYSTOLIC BLOOD PRESSURE: 172 MMHG | WEIGHT: 264 LBS | RESPIRATION RATE: 16 BRPM | DIASTOLIC BLOOD PRESSURE: 70 MMHG

## 2019-02-28 VITALS
DIASTOLIC BLOOD PRESSURE: 62 MMHG | BODY MASS INDEX: 38.08 KG/M2 | WEIGHT: 266 LBS | SYSTOLIC BLOOD PRESSURE: 126 MMHG | HEIGHT: 70 IN | HEART RATE: 70 BPM

## 2019-02-28 VITALS
HEIGHT: 70 IN | RESPIRATION RATE: 18 BRPM | WEIGHT: 247 LBS | BODY MASS INDEX: 35.36 KG/M2 | SYSTOLIC BLOOD PRESSURE: 132 MMHG | DIASTOLIC BLOOD PRESSURE: 64 MMHG | HEART RATE: 70 BPM

## 2019-03-01 VITALS
DIASTOLIC BLOOD PRESSURE: 70 MMHG | WEIGHT: 269 LBS | HEART RATE: 60 BPM | HEIGHT: 70 IN | RESPIRATION RATE: 18 BRPM | BODY MASS INDEX: 38.51 KG/M2 | SYSTOLIC BLOOD PRESSURE: 144 MMHG

## 2019-03-01 VITALS
RESPIRATION RATE: 16 BRPM | SYSTOLIC BLOOD PRESSURE: 150 MMHG | WEIGHT: 267 LBS | HEART RATE: 64 BPM | DIASTOLIC BLOOD PRESSURE: 65 MMHG

## 2019-03-27 RX ORDER — RANITIDINE 150 MG/1
TABLET ORAL
COMMUNITY
End: 2020-01-22 | Stop reason: ALTCHOICE

## 2019-03-27 RX ORDER — TAMSULOSIN HYDROCHLORIDE 0.4 MG/1
2 CAPSULE ORAL AT BEDTIME
COMMUNITY
Start: 2014-07-23

## 2019-03-27 RX ORDER — HYDROCODONE BITARTRATE AND ACETAMINOPHEN 10; 325 MG/1; MG/1
2 TABLET ORAL 2 TIMES DAILY
COMMUNITY
Start: 2018-04-16

## 2019-03-27 RX ORDER — AMPICILLIN TRIHYDRATE 500 MG
CAPSULE ORAL
COMMUNITY
Start: 2016-07-13

## 2019-03-27 RX ORDER — LISINOPRIL AND HYDROCHLOROTHIAZIDE 20; 12.5 MG/1; MG/1
1 TABLET ORAL 2 TIMES DAILY
COMMUNITY
Start: 2014-07-23 | End: 2019-12-09

## 2019-03-27 RX ORDER — AMOXICILLIN 500 MG
CAPSULE ORAL
COMMUNITY

## 2019-03-27 RX ORDER — HYDROCORTISONE ACETATE 0.5 %
CREAM (GRAM) TOPICAL
COMMUNITY

## 2019-03-27 RX ORDER — DIPHENOXYLATE HYDROCHLORIDE AND ATROPINE SULFATE 2.5; .025 MG/1; MG/1
TABLET ORAL
COMMUNITY

## 2019-03-27 RX ORDER — WARFARIN SODIUM 5 MG/1
TABLET ORAL
COMMUNITY
Start: 2015-10-15 | End: 2020-01-22

## 2019-03-27 RX ORDER — UBIDECARENONE 30 MG
2 CAPSULE ORAL 2 TIMES DAILY
COMMUNITY
Start: 2018-04-16

## 2019-03-27 RX ORDER — INSULIN GLARGINE 100 [IU]/ML
50 INJECTION, SOLUTION SUBCUTANEOUS DAILY
COMMUNITY

## 2019-03-27 RX ORDER — METOPROLOL SUCCINATE 50 MG/1
TABLET, EXTENDED RELEASE ORAL
COMMUNITY
Start: 2018-03-29 | End: 2019-03-30 | Stop reason: SDUPTHER

## 2019-03-27 RX ORDER — ACETAMINOPHEN 160 MG
TABLET,DISINTEGRATING ORAL
COMMUNITY
End: 2019-07-11 | Stop reason: SDUPTHER

## 2019-04-01 RX ORDER — ACETAMINOPHEN 160 MG
2000 TABLET,DISINTEGRATING ORAL
COMMUNITY
Start: 2018-04-16

## 2019-04-01 RX ORDER — DULOXETIN HYDROCHLORIDE 60 MG/1
60 CAPSULE, DELAYED RELEASE ORAL 2 TIMES DAILY
COMMUNITY
Start: 2018-10-11

## 2019-04-01 RX ORDER — LANOLIN ALCOHOL/MO/W.PET/CERES
CREAM (GRAM) TOPICAL
COMMUNITY
Start: 2018-04-16

## 2019-04-01 RX ORDER — UBIDECARENONE 200 MG
200 CAPSULE ORAL
COMMUNITY
Start: 2016-10-13

## 2019-04-01 RX ORDER — METOPROLOL SUCCINATE 50 MG/1
TABLET, EXTENDED RELEASE ORAL
Qty: 90 TABLET | Refills: 3 | Status: SHIPPED | OUTPATIENT
Start: 2019-04-01 | End: 2019-09-11

## 2019-04-15 RX ORDER — DULOXETIN HYDROCHLORIDE 60 MG/1
CAPSULE, DELAYED RELEASE ORAL
Qty: 180 CAPSULE | Refills: 1 | Status: SHIPPED | OUTPATIENT
Start: 2019-04-15 | End: 2019-08-14

## 2019-04-15 NOTE — TELEPHONE ENCOUNTER
LOV:2-13  Last Filled:10-17, #180 with 1 refill  Labs:   Future Appointments   Date Time Provider Hien Morales   5/8/2019 12:30 PM Jordan Coppola MD [facility] [place] [facility]   5/15/2019  2:30 PM John Rodriguez MD [address] [place] [address]       Please Adv

## 2019-05-08 ENCOUNTER — OFFICE VISIT (OUTPATIENT)
Dept: PULMONOLOGY | Facility: CLINIC | Age: 69
End: 2019-05-08
Payer: MEDICARE

## 2019-05-08 VITALS
SYSTOLIC BLOOD PRESSURE: 150 MMHG | HEIGHT: 73 IN | BODY MASS INDEX: 36.05 KG/M2 | DIASTOLIC BLOOD PRESSURE: 60 MMHG | OXYGEN SATURATION: 98 % | WEIGHT: 272 LBS | HEART RATE: 71 BPM | RESPIRATION RATE: 16 BRPM

## 2019-05-08 DIAGNOSIS — R91.8 LUNG NODULES: ICD-10-CM

## 2019-05-08 DIAGNOSIS — G47.33 OSA ON CPAP: ICD-10-CM

## 2019-05-08 DIAGNOSIS — Z99.89 OSA ON CPAP: ICD-10-CM

## 2019-05-08 DIAGNOSIS — J44.9 CHRONIC OBSTRUCTIVE PULMONARY DISEASE, UNSPECIFIED COPD TYPE (HCC): Primary | ICD-10-CM

## 2019-05-08 PROCEDURE — 99214 OFFICE O/P EST MOD 30 MIN: CPT | Performed by: INTERNAL MEDICINE

## 2019-05-08 PROCEDURE — G0463 HOSPITAL OUTPT CLINIC VISIT: HCPCS | Performed by: INTERNAL MEDICINE

## 2019-05-08 NOTE — PROGRESS NOTES
HPI:    Patient ID: Cassandra Porter is a 70year old male.     HPI    Breathing well with no problem denies cough or sputum denied exacerbation of COPD in the last year  No fever or chills  Gained 10 pound  Chronic mild lower extremity edema  No wheezes 20-12.5 MG Oral Tab 2 (two) times daily. Disp:  Rfl:    MetFORMIN HCl ER (GLUCOPHAGE-XR) 500 MG Oral Tablet 24 Hr 500 mg 2 (two) times daily. Disp:  Rfl:    Ranitidine HCl (ZANTAC) 150 MG Oral Tab Take 150 mg by mouth nightly.  Disp:  Rfl:    Multiple Vit    2) GAB   Doing great on cpap 11 cwp / full face mask   Excellent compliance and clinical improvement         Diet /exercsie and weight reduction      Avoid driving if sleepy      Avoid extra use of sedative and narcotics         3-multiple small nonsp

## 2019-05-15 ENCOUNTER — OFFICE VISIT (OUTPATIENT)
Dept: RHEUMATOLOGY | Facility: CLINIC | Age: 69
End: 2019-05-15
Payer: MEDICARE

## 2019-05-15 ENCOUNTER — APPOINTMENT (OUTPATIENT)
Dept: CARDIOLOGY | Age: 69
End: 2019-05-15
Attending: INTERNAL MEDICINE

## 2019-05-15 VITALS
HEIGHT: 73 IN | DIASTOLIC BLOOD PRESSURE: 69 MMHG | HEART RATE: 74 BPM | WEIGHT: 267 LBS | BODY MASS INDEX: 35.39 KG/M2 | SYSTOLIC BLOOD PRESSURE: 160 MMHG

## 2019-05-15 DIAGNOSIS — M15.9 OSTEOARTHRITIS OF MULTIPLE JOINTS, UNSPECIFIED OSTEOARTHRITIS TYPE: ICD-10-CM

## 2019-05-15 DIAGNOSIS — M35.3 PMR (POLYMYALGIA RHEUMATICA) (HCC): Primary | ICD-10-CM

## 2019-05-15 DIAGNOSIS — Z51.81 THERAPEUTIC DRUG MONITORING: ICD-10-CM

## 2019-05-15 PROCEDURE — G0463 HOSPITAL OUTPT CLINIC VISIT: HCPCS | Performed by: INTERNAL MEDICINE

## 2019-05-15 PROCEDURE — 99214 OFFICE O/P EST MOD 30 MIN: CPT | Performed by: INTERNAL MEDICINE

## 2019-05-15 RX ORDER — HYDROCODONE BITARTRATE AND ACETAMINOPHEN 10; 325 MG/1; MG/1
TABLET ORAL
Qty: 150 TABLET | Refills: 0 | Status: SHIPPED | OUTPATIENT
Start: 2019-05-15 | End: 2019-06-15

## 2019-05-15 RX ORDER — LISINOPRIL 40 MG/1
40 TABLET ORAL DAILY
Status: ON HOLD | COMMUNITY
Start: 2019-05-13 | End: 2019-11-05

## 2019-05-15 RX ORDER — HYDROCODONE BITARTRATE AND ACETAMINOPHEN 10; 325 MG/1; MG/1
2 TABLET ORAL
Status: ON HOLD | COMMUNITY
Start: 2018-04-16 | End: 2019-11-03

## 2019-05-15 RX ORDER — HYDROCODONE BITARTRATE AND ACETAMINOPHEN 10; 325 MG/1; MG/1
TABLET ORAL
Qty: 150 TABLET | Refills: 0 | Status: SHIPPED | OUTPATIENT
Start: 2019-06-15 | End: 2019-07-15

## 2019-05-15 RX ORDER — HYDROCODONE BITARTRATE AND ACETAMINOPHEN 10; 325 MG/1; MG/1
TABLET ORAL
Qty: 150 TABLET | Refills: 0 | Status: SHIPPED | OUTPATIENT
Start: 2019-07-15 | End: 2019-08-15

## 2019-05-15 RX ORDER — CIPROFLOXACIN 500 MG/1
500 TABLET, FILM COATED ORAL 2 TIMES DAILY
COMMUNITY
Start: 2019-05-13 | End: 2019-08-14 | Stop reason: ALTCHOICE

## 2019-05-15 RX ORDER — HYDROCHLOROTHIAZIDE 12.5 MG/1
12.5 CAPSULE, GELATIN COATED ORAL 2 TIMES DAILY
COMMUNITY
Start: 2019-05-13 | End: 2020-06-03

## 2019-05-15 NOTE — PATIENT INSTRUCTIONS
1. Cont. Duloxetine - 60mg ad ay twice a day   2. Cont.  norco 10/325mg 2 tablets in am and pm and 1 tablet in afternoon  #150 - with 2 refills -   3. Return to clinic in 3 months.

## 2019-05-15 NOTE — PROGRESS NOTES
Schuyler Morgan is a 70year old male who presents for Patient presents with:  PMR  .   HPI:     He is a pleasant 75 year who has elevated sed rate, PMR and fibromyalgia with osteoarthirtis chronic pain. He is here on  11/14/2018.    He worked for [employer] He had an accident in his back yard - he had right knee meniscal tear and he hurt his neck. He couln't move his head. He went to see Dr. Gaby Crawford and he couldn't walk on his knee. He was sent to ortho.  He had to go back up on his norco b/c he needed more 05/15/19 : 267 lb (121.1 kg)  05/08/19 : 272 lb (123.4 kg)    Body mass index is 35.23 kg/m². Current Outpatient Medications:  Ciprofloxacin HCl 500 MG Oral Tab Take 500 mg by mouth 2 (two) times daily.  Disp:  Rfl:    hydrochlorothiazide 12.5 MG Ora Omega-3 Fatty Acids (FISH OIL) 1200 MG Oral Cap Take  by mouth daily. Disp:  Rfl:    Coenzyme Q10 (COQ10) 200 MG Oral Cap Take 200 mg by mouth daily. Disp:  Rfl:    Cholecalciferol (VITAMIN D3) 2000 UNITS Oral Tab Take by mouth.  Take 3 tabs daily  Disp: All other ROS are negative.      EXAM:   /69 (BP Location: Right arm, Patient Position: Sitting, Cuff Size: large)   Pulse 74   Ht 6' 1\" (1.854 m)   Wt 267 lb (121.1 kg)   BMI 35.23 kg/m²   HEENT: Clear oropharynx, no oral ulcers, EOM intact, clear s He is not been able to tapered his norco  tid - not able to taperoff in the  past but he's been able to take 2 tabs bid.  - will try to taper back down to this   He will now taper to norco 10mg 2 tablets in am and 1 tablet in afternoon and 2 tablets in pm - in the past, really uncontrolled, - sugars raise up b/c of steroids - right now he has trouble affording increase in lantus b/c of donut hole and victoza   3. He's on coumadin for pacemaker -   4.  Hx of prostate sx - he can't empty bladder - so self cath

## 2019-06-12 ENCOUNTER — ANCILLARY PROCEDURE (OUTPATIENT)
Dept: CARDIOLOGY | Age: 69
End: 2019-06-12
Attending: INTERNAL MEDICINE

## 2019-06-12 ENCOUNTER — ANCILLARY ORDERS (OUTPATIENT)
Dept: CARDIOLOGY | Age: 69
End: 2019-06-12

## 2019-06-12 DIAGNOSIS — Z95.0 CARDIAC PACEMAKER: ICD-10-CM

## 2019-06-12 PROCEDURE — 93294 REM INTERROG EVL PM/LDLS PM: CPT | Performed by: INTERNAL MEDICINE

## 2019-07-05 RX ORDER — FOLIC ACID 1 MG/1
TABLET ORAL
Qty: 180 TABLET | Refills: 3 | OUTPATIENT
Start: 2019-07-05

## 2019-07-05 NOTE — TELEPHONE ENCOUNTER
Last filled: 6/29/18 #180 tab with 3 refills  LOV:  5/15/19  Future Appointments   Date Time Provider Hien Carmen   8/14/2019  2:30 PM Jeffrey Knapp MD [facility]     Labs:   10/18/17      Summary:  1. Cont. Duloxetine - 60mg ad ay twice a day   2. Cont.  norco 10/325mg 2 tablets in am and pm and 1 tablet in afternoon  #150 - with 2 refills -   3. Return to clinic in 3 months. Marvin Blank MD  5/15/2019   2:58 PM  - [place]ed [place]  through [address] [place] [address]    Please advise.

## 2019-07-11 PROBLEM — Z95.0 PACEMAKER: Status: ACTIVE | Noted: 2019-07-11

## 2019-07-11 PROBLEM — I50.20 SYSTOLIC HEART FAILURE (CMD): Status: ACTIVE | Noted: 2019-07-11

## 2019-07-11 PROBLEM — I44.7 LBBB (LEFT BUNDLE BRANCH BLOCK): Status: ACTIVE | Noted: 2019-07-11

## 2019-07-11 PROBLEM — I10 HYPERTENSION: Status: ACTIVE | Noted: 2019-07-11

## 2019-07-11 PROBLEM — E78.00 PURE HYPERCHOLESTEROLEMIA: Status: ACTIVE | Noted: 2019-07-11

## 2019-07-11 PROBLEM — I25.10 CAD, MULTIPLE VESSEL: Status: ACTIVE | Noted: 2019-07-11

## 2019-07-11 PROBLEM — J90 PLEURAL EFFUSION: Status: ACTIVE | Noted: 2019-07-11

## 2019-07-11 PROBLEM — I48.20 CHRONIC ATRIAL FIBRILLATION  (CMD): Status: ACTIVE | Noted: 2019-07-11

## 2019-07-11 PROBLEM — I42.0 DILATED CARDIOMYOPATHY (CMD): Status: ACTIVE | Noted: 2019-07-11

## 2019-07-11 PROBLEM — E10.9 DM TYPE 1 (DIABETES MELLITUS, TYPE 1) (CMD): Status: ACTIVE | Noted: 2019-07-11

## 2019-07-11 RX ORDER — PEN NEEDLE, DIABETIC 30 GX5/16"
NEEDLE, DISPOSABLE MISCELLANEOUS
COMMUNITY
Start: 2018-04-16 | End: 2019-08-06 | Stop reason: SDUPTHER

## 2019-07-11 RX ORDER — LISINOPRIL 40 MG/1
40 TABLET ORAL DAILY
COMMUNITY
Start: 2019-05-13

## 2019-07-11 RX ORDER — HYDROCHLOROTHIAZIDE 12.5 MG/1
12.5 CAPSULE, GELATIN COATED ORAL DAILY
COMMUNITY
Start: 2019-05-13 | End: 2020-01-23 | Stop reason: ALTCHOICE

## 2019-07-11 RX ORDER — CIPROFLOXACIN 500 MG/1
500 TABLET, FILM COATED ORAL 2 TIMES DAILY
COMMUNITY
Start: 2019-05-13 | End: 2019-09-11

## 2019-07-11 RX ORDER — PEN NEEDLE, DIABETIC 30 GX5/16"
NEEDLE, DISPOSABLE MISCELLANEOUS
COMMUNITY
Start: 2018-04-16

## 2019-07-17 ENCOUNTER — APPOINTMENT (OUTPATIENT)
Dept: CARDIOLOGY | Age: 69
End: 2019-07-17

## 2019-08-14 ENCOUNTER — APPOINTMENT (OUTPATIENT)
Dept: LAB | Facility: HOSPITAL | Age: 69
End: 2019-08-14
Attending: INTERNAL MEDICINE
Payer: MEDICARE

## 2019-08-14 ENCOUNTER — OFFICE VISIT (OUTPATIENT)
Dept: RHEUMATOLOGY | Facility: CLINIC | Age: 69
End: 2019-08-14
Payer: MEDICARE

## 2019-08-14 VITALS
BODY MASS INDEX: 35.78 KG/M2 | WEIGHT: 270 LBS | DIASTOLIC BLOOD PRESSURE: 77 MMHG | HEART RATE: 71 BPM | SYSTOLIC BLOOD PRESSURE: 163 MMHG | HEIGHT: 73 IN

## 2019-08-14 DIAGNOSIS — M15.9 OSTEOARTHRITIS OF MULTIPLE JOINTS, UNSPECIFIED OSTEOARTHRITIS TYPE: ICD-10-CM

## 2019-08-14 DIAGNOSIS — M35.3 PMR (POLYMYALGIA RHEUMATICA) (HCC): Primary | ICD-10-CM

## 2019-08-14 DIAGNOSIS — Z51.81 THERAPEUTIC DRUG MONITORING: ICD-10-CM

## 2019-08-14 DIAGNOSIS — M35.3 PMR (POLYMYALGIA RHEUMATICA) (HCC): ICD-10-CM

## 2019-08-14 LAB
CRP SERPL-MCNC: 1.75 MG/DL (ref ?–0.3)
ERYTHROCYTE [SEDIMENTATION RATE] IN BLOOD: 51 MM/HR (ref 0–20)

## 2019-08-14 PROCEDURE — 85652 RBC SED RATE AUTOMATED: CPT

## 2019-08-14 PROCEDURE — 99213 OFFICE O/P EST LOW 20 MIN: CPT | Performed by: INTERNAL MEDICINE

## 2019-08-14 PROCEDURE — 86140 C-REACTIVE PROTEIN: CPT

## 2019-08-14 PROCEDURE — G0463 HOSPITAL OUTPT CLINIC VISIT: HCPCS | Performed by: INTERNAL MEDICINE

## 2019-08-14 PROCEDURE — 36415 COLL VENOUS BLD VENIPUNCTURE: CPT

## 2019-08-14 RX ORDER — HYDROCODONE BITARTRATE AND ACETAMINOPHEN 10; 325 MG/1; MG/1
TABLET ORAL
Qty: 150 TABLET | Refills: 0 | Status: SHIPPED | OUTPATIENT
Start: 2019-10-14 | End: 2019-11-14

## 2019-08-14 RX ORDER — HYDROCODONE BITARTRATE AND ACETAMINOPHEN 10; 325 MG/1; MG/1
TABLET ORAL
Qty: 150 TABLET | Refills: 0 | Status: SHIPPED | OUTPATIENT
Start: 2019-09-14 | End: 2019-10-14

## 2019-08-14 RX ORDER — DULOXETIN HYDROCHLORIDE 60 MG/1
CAPSULE, DELAYED RELEASE ORAL
Qty: 180 CAPSULE | Refills: 1 | Status: SHIPPED | OUTPATIENT
Start: 2019-08-14 | End: 2020-04-03

## 2019-08-14 RX ORDER — AMOXICILLIN 250 MG
2 CAPSULE ORAL 2 TIMES DAILY
COMMUNITY
Start: 2019-07-15

## 2019-08-14 RX ORDER — HYDROCODONE BITARTRATE AND ACETAMINOPHEN 10; 325 MG/1; MG/1
TABLET ORAL
Qty: 150 TABLET | Refills: 0 | Status: SHIPPED | OUTPATIENT
Start: 2019-08-14 | End: 2019-09-14

## 2019-08-14 NOTE — PATIENT INSTRUCTIONS
1. Cont. Duloxetine - 60mg ad ay twice a day   2. Cont.  norco 10/325mg 2 tablets in am and pm and 1 tablet in afternoon  #150 - with 2 refills -   3. Return to clinic in 3 months. - f/u with dr. Benito Field or dr. Danay Pfeiffer  4.  Follow up with physical therapy fo

## 2019-08-14 NOTE — PROGRESS NOTES
Darryl Rutherford is a 78year old male who presents for Patient presents with:  PMR: pt reports back pain, b/l hip and knee pain, \"about the same as before\"  .    HPI:     He is a pleasant 75 year who has elevated sed rate, PMR and fibromyalgia with ost He had an accident in his back yard - he had right knee meniscal tear and he hurt his neck. He couln't move his head. He went to see Dr. Kelly Deleon and he couldn't walk on his knee. He was sent to ortho.  He had to go back up on his norco b/c he needed more He takes norco 2 tablets in am and 1 in afternoon and 2 tablets in evening. He has pain more in the evening. It's all over -   Still walking without a cane. He stopped victoza b/c he couldn't afford it. He's still in [facility].  Dr. Medina Montiel is managing ULTICARE MINI PEN NEEDLES 31G X 6 MM Does not apply Misc  Disp:  Rfl:    MetFORMIN HCl ER (GLUCOPHAGE-XR) 500 MG Oral Tablet 24 Hr 500 mg 2 (two) times daily. Disp:  Rfl:    Ranitidine HCl (ZANTAC) 150 MG Oral Tab Take 150 mg by mouth nightly.  Disp:  Rfl: Family History   Problem Relation Age of Onset   • Cancer Father    • Cancer Mother    • Other (rheumatoid arthritis) Mother    1 brother -    Social History:  Social History    Tobacco Use      Smoking status: Former Smoker        Packs/day: 2.00        Y ASSESSMENT AND PLAN:   Mitra Moya is a 78year old male who presents for Patient presents with:  PMR: pt reports back pain, b/l hip and knee pain, \"about the same as before\"      1.  Myalgia/polyarthralgia  - PMR verssuss seronnegative arthtri - in the past -  had a long discussion about his care - d/w him that rheumatology wise not able to treat his inflammation - he should seek 2nd opioin. He feels norco iscontrolling his pain.  e states he has been on norco for 5 years taking 2 at night only n

## 2019-09-11 ENCOUNTER — OFFICE VISIT (OUTPATIENT)
Dept: CARDIOLOGY | Age: 69
End: 2019-09-11

## 2019-09-11 VITALS
SYSTOLIC BLOOD PRESSURE: 136 MMHG | OXYGEN SATURATION: 96 % | WEIGHT: 265 LBS | BODY MASS INDEX: 35.89 KG/M2 | HEART RATE: 70 BPM | DIASTOLIC BLOOD PRESSURE: 74 MMHG | HEIGHT: 72 IN

## 2019-09-11 DIAGNOSIS — I48.20 CHRONIC ATRIAL FIBRILLATION (CMD): ICD-10-CM

## 2019-09-11 DIAGNOSIS — I42.0 DILATED CARDIOMYOPATHY (CMD): Primary | ICD-10-CM

## 2019-09-11 PROCEDURE — 99214 OFFICE O/P EST MOD 30 MIN: CPT | Performed by: INTERNAL MEDICINE

## 2019-09-11 RX ORDER — METOPROLOL TARTRATE 50 MG/1
50 TABLET, FILM COATED ORAL 2 TIMES DAILY
COMMUNITY
End: 2019-12-09

## 2019-09-11 RX ORDER — GLUCOSAMINE HCL/CHONDROITIN SU 500-400 MG
CAPSULE ORAL
COMMUNITY
Start: 2019-08-28

## 2019-09-11 RX ORDER — FOLIC ACID 1 MG/1
1 TABLET ORAL 2 TIMES DAILY
COMMUNITY
Start: 2018-06-29

## 2019-09-11 ASSESSMENT — PATIENT HEALTH QUESTIONNAIRE - PHQ9
SUM OF ALL RESPONSES TO PHQ9 QUESTIONS 1 AND 2: 0
1. LITTLE INTEREST OR PLEASURE IN DOING THINGS: NOT AT ALL
2. FEELING DOWN, DEPRESSED OR HOPELESS: NOT AT ALL
SUM OF ALL RESPONSES TO PHQ9 QUESTIONS 1 AND 2: 0

## 2019-09-16 ENCOUNTER — ANCILLARY ORDERS (OUTPATIENT)
Dept: CARDIOLOGY | Age: 69
End: 2019-09-16

## 2019-09-16 ENCOUNTER — ANCILLARY PROCEDURE (OUTPATIENT)
Dept: CARDIOLOGY | Age: 69
End: 2019-09-16
Attending: INTERNAL MEDICINE

## 2019-09-16 DIAGNOSIS — Z95.0 PACEMAKER: ICD-10-CM

## 2019-09-16 PROCEDURE — 93294 REM INTERROG EVL PM/LDLS PM: CPT | Performed by: INTERNAL MEDICINE

## 2019-09-18 ENCOUNTER — HOSPITAL ENCOUNTER (OUTPATIENT)
Dept: CT IMAGING | Facility: HOSPITAL | Age: 69
Discharge: HOME | End: 2019-09-18
Attending: INTERNAL MEDICINE
Payer: MEDICARE

## 2019-09-18 DIAGNOSIS — R91.8 LUNG NODULES: ICD-10-CM

## 2019-09-18 PROCEDURE — 71250 CT THORAX DX C-: CPT | Performed by: INTERNAL MEDICINE

## 2019-10-02 ENCOUNTER — TELEPHONE (OUTPATIENT)
Dept: PULMONOLOGY | Facility: CLINIC | Age: 69
End: 2019-10-02

## 2019-10-02 DIAGNOSIS — R91.1 PULMONARY NODULE: Primary | ICD-10-CM

## 2019-10-02 NOTE — TELEPHONE ENCOUNTER
Patient calling for results from his CT,patient requesting to speak with Remi Lu, please call at:[phone],thanks.

## 2019-10-02 NOTE — TELEPHONE ENCOUNTER
Call the patient and informed him about his stable chest CT of the lung  The 7 mm nodule is a stable from November 2018  Last chest CT in 1 year interval  to assure 2 years stability

## 2019-10-03 NOTE — TELEPHONE ENCOUNTER
Pt informed of Dr. Marvin Kent below message/results/orders. Pt verbalized understanding and states Dr. Youngblood Sessions called him last night. Order added to chronic calendar.

## 2019-10-21 ENCOUNTER — TELEPHONE (OUTPATIENT)
Dept: PULMONOLOGY | Facility: CLINIC | Age: 69
End: 2019-10-21

## 2019-11-03 ENCOUNTER — APPOINTMENT (OUTPATIENT)
Dept: GENERAL RADIOLOGY | Facility: HOSPITAL | Age: 69
DRG: 699 | End: 2019-11-03
Attending: UROLOGY
Payer: MEDICARE

## 2019-11-03 ENCOUNTER — APPOINTMENT (OUTPATIENT)
Dept: CT IMAGING | Facility: HOSPITAL | Age: 69
DRG: 699 | End: 2019-11-03
Attending: EMERGENCY MEDICINE
Payer: MEDICARE

## 2019-11-03 ENCOUNTER — ANESTHESIA EVENT (OUTPATIENT)
Dept: SURGERY | Facility: HOSPITAL | Age: 69
DRG: 699 | End: 2019-11-03
Payer: MEDICARE

## 2019-11-03 ENCOUNTER — ANESTHESIA (OUTPATIENT)
Dept: SURGERY | Facility: HOSPITAL | Age: 69
DRG: 699 | End: 2019-11-03
Payer: MEDICARE

## 2019-11-03 ENCOUNTER — HOSPITAL ENCOUNTER (INPATIENT)
Facility: HOSPITAL | Age: 69
LOS: 2 days | Discharge: HOME | DRG: 699 | End: 2019-11-05
Attending: EMERGENCY MEDICINE | Admitting: INTERNAL MEDICINE
Payer: MEDICARE

## 2019-11-03 DIAGNOSIS — R58 HEMORRHAGE: ICD-10-CM

## 2019-11-03 DIAGNOSIS — R31.0 GROSS HEMATURIA: ICD-10-CM

## 2019-11-03 DIAGNOSIS — R73.9 HYPERGLYCEMIA: Primary | ICD-10-CM

## 2019-11-03 PROCEDURE — 52001 CYSTO W/IRRG&EVAC MLT CLOTS: CPT | Performed by: UROLOGY

## 2019-11-03 PROCEDURE — 0TCB8ZZ EXTIRPATION OF MATTER FROM BLADDER, VIA NATURAL OR ARTIFICIAL OPENING ENDOSCOPIC: ICD-10-PCS | Performed by: UROLOGY

## 2019-11-03 PROCEDURE — 99222 1ST HOSP IP/OBS MODERATE 55: CPT | Performed by: INTERNAL MEDICINE

## 2019-11-03 PROCEDURE — 74178 CT ABD&PLV WO CNTR FLWD CNTR: CPT | Performed by: EMERGENCY MEDICINE

## 2019-11-03 PROCEDURE — 99223 1ST HOSP IP/OBS HIGH 75: CPT | Performed by: UROLOGY

## 2019-11-03 RX ORDER — ONDANSETRON 2 MG/ML
INJECTION INTRAMUSCULAR; INTRAVENOUS AS NEEDED
Status: DISCONTINUED | OUTPATIENT
Start: 2019-11-03 | End: 2019-11-03 | Stop reason: SURG

## 2019-11-03 RX ORDER — MORPHINE SULFATE 4 MG/ML
INJECTION, SOLUTION INTRAMUSCULAR; INTRAVENOUS
Status: COMPLETED
Start: 2019-11-03 | End: 2019-11-03

## 2019-11-03 RX ORDER — MORPHINE SULFATE 4 MG/ML
4 INJECTION, SOLUTION INTRAMUSCULAR; INTRAVENOUS ONCE
Status: COMPLETED | OUTPATIENT
Start: 2019-11-03 | End: 2019-11-03

## 2019-11-03 RX ORDER — METOPROLOL SUCCINATE 50 MG/1
50 TABLET, EXTENDED RELEASE ORAL
Status: DISCONTINUED | OUTPATIENT
Start: 2019-11-04 | End: 2019-11-03

## 2019-11-03 RX ORDER — LISINOPRIL 40 MG/1
40 TABLET ORAL DAILY
Status: DISCONTINUED | OUTPATIENT
Start: 2019-11-04 | End: 2019-11-05

## 2019-11-03 RX ORDER — FINASTERIDE 5 MG/1
5 TABLET, FILM COATED ORAL DAILY
Status: DISCONTINUED | OUTPATIENT
Start: 2019-11-03 | End: 2019-11-05

## 2019-11-03 RX ORDER — PROCHLORPERAZINE EDISYLATE 5 MG/ML
5 INJECTION INTRAMUSCULAR; INTRAVENOUS ONCE AS NEEDED
Status: DISCONTINUED | OUTPATIENT
Start: 2019-11-03 | End: 2019-11-03 | Stop reason: HOSPADM

## 2019-11-03 RX ORDER — ALFUZOSIN HYDROCHLORIDE 10 MG/1
10 TABLET, EXTENDED RELEASE ORAL
Status: DISCONTINUED | OUTPATIENT
Start: 2019-11-04 | End: 2019-11-05

## 2019-11-03 RX ORDER — SODIUM CHLORIDE 9 MG/ML
INJECTION, SOLUTION INTRAVENOUS CONTINUOUS PRN
Status: DISCONTINUED | OUTPATIENT
Start: 2019-11-03 | End: 2019-11-03 | Stop reason: SURG

## 2019-11-03 RX ORDER — MORPHINE SULFATE 4 MG/ML
4 INJECTION, SOLUTION INTRAMUSCULAR; INTRAVENOUS EVERY 10 MIN PRN
Status: DISCONTINUED | OUTPATIENT
Start: 2019-11-03 | End: 2019-11-03 | Stop reason: HOSPADM

## 2019-11-03 RX ORDER — MORPHINE SULFATE 4 MG/ML
2 INJECTION, SOLUTION INTRAMUSCULAR; INTRAVENOUS ONCE
Status: COMPLETED | OUTPATIENT
Start: 2019-11-03 | End: 2019-11-03

## 2019-11-03 RX ORDER — ACETAMINOPHEN 500 MG
1000 TABLET ORAL NIGHTLY
Status: DISCONTINUED | OUTPATIENT
Start: 2019-11-03 | End: 2019-11-05

## 2019-11-03 RX ORDER — HYDROCODONE BITARTRATE AND ACETAMINOPHEN 5; 325 MG/1; MG/1
2 TABLET ORAL AS NEEDED
Status: DISCONTINUED | OUTPATIENT
Start: 2019-11-03 | End: 2019-11-03 | Stop reason: HOSPADM

## 2019-11-03 RX ORDER — METOPROLOL TARTRATE 5 MG/5ML
2.5 INJECTION INTRAVENOUS ONCE
Status: DISCONTINUED | OUTPATIENT
Start: 2019-11-03 | End: 2019-11-03 | Stop reason: HOSPADM

## 2019-11-03 RX ORDER — NALOXONE HYDROCHLORIDE 0.4 MG/ML
80 INJECTION, SOLUTION INTRAMUSCULAR; INTRAVENOUS; SUBCUTANEOUS AS NEEDED
Status: DISCONTINUED | OUTPATIENT
Start: 2019-11-03 | End: 2019-11-03 | Stop reason: HOSPADM

## 2019-11-03 RX ORDER — SENNA AND DOCUSATE SODIUM 50; 8.6 MG/1; MG/1
2 TABLET, FILM COATED ORAL 2 TIMES DAILY
Status: DISCONTINUED | OUTPATIENT
Start: 2019-11-03 | End: 2019-11-05

## 2019-11-03 RX ORDER — HYDROMORPHONE HYDROCHLORIDE 1 MG/ML
0.4 INJECTION, SOLUTION INTRAMUSCULAR; INTRAVENOUS; SUBCUTANEOUS EVERY 5 MIN PRN
Status: DISCONTINUED | OUTPATIENT
Start: 2019-11-03 | End: 2019-11-03 | Stop reason: HOSPADM

## 2019-11-03 RX ORDER — HALOPERIDOL 5 MG/ML
0.25 INJECTION INTRAMUSCULAR ONCE AS NEEDED
Status: DISCONTINUED | OUTPATIENT
Start: 2019-11-03 | End: 2019-11-03 | Stop reason: HOSPADM

## 2019-11-03 RX ORDER — EPHEDRINE SULFATE 50 MG/ML
INJECTION, SOLUTION INTRAVENOUS AS NEEDED
Status: DISCONTINUED | OUTPATIENT
Start: 2019-11-03 | End: 2019-11-03 | Stop reason: SURG

## 2019-11-03 RX ORDER — FAMOTIDINE 20 MG/1
20 TABLET ORAL DAILY
Status: DISCONTINUED | OUTPATIENT
Start: 2019-11-04 | End: 2019-11-05

## 2019-11-03 RX ORDER — MORPHINE SULFATE 2 MG/ML
2 INJECTION, SOLUTION INTRAMUSCULAR; INTRAVENOUS EVERY 10 MIN PRN
Status: DISCONTINUED | OUTPATIENT
Start: 2019-11-03 | End: 2019-11-03 | Stop reason: HOSPADM

## 2019-11-03 RX ORDER — DEXAMETHASONE SODIUM PHOSPHATE 4 MG/ML
VIAL (ML) INJECTION AS NEEDED
Status: DISCONTINUED | OUTPATIENT
Start: 2019-11-03 | End: 2019-11-03 | Stop reason: SURG

## 2019-11-03 RX ORDER — LIDOCAINE HYDROCHLORIDE 40 MG/ML
SOLUTION TOPICAL AS NEEDED
Status: DISCONTINUED | OUTPATIENT
Start: 2019-11-03 | End: 2019-11-03 | Stop reason: SURG

## 2019-11-03 RX ORDER — PHENYLEPHRINE HCL 10 MG/ML
VIAL (ML) INJECTION AS NEEDED
Status: DISCONTINUED | OUTPATIENT
Start: 2019-11-03 | End: 2019-11-03 | Stop reason: SURG

## 2019-11-03 RX ORDER — HYDROMORPHONE HYDROCHLORIDE 1 MG/ML
0.6 INJECTION, SOLUTION INTRAMUSCULAR; INTRAVENOUS; SUBCUTANEOUS EVERY 5 MIN PRN
Status: DISCONTINUED | OUTPATIENT
Start: 2019-11-03 | End: 2019-11-03 | Stop reason: HOSPADM

## 2019-11-03 RX ORDER — HYDROCODONE BITARTRATE AND ACETAMINOPHEN 5; 325 MG/1; MG/1
1 TABLET ORAL AS NEEDED
Status: DISCONTINUED | OUTPATIENT
Start: 2019-11-03 | End: 2019-11-03 | Stop reason: HOSPADM

## 2019-11-03 RX ORDER — ONDANSETRON 2 MG/ML
4 INJECTION INTRAMUSCULAR; INTRAVENOUS ONCE AS NEEDED
Status: DISCONTINUED | OUTPATIENT
Start: 2019-11-03 | End: 2019-11-03 | Stop reason: HOSPADM

## 2019-11-03 RX ORDER — SODIUM CHLORIDE, SODIUM LACTATE, POTASSIUM CHLORIDE, CALCIUM CHLORIDE 600; 310; 30; 20 MG/100ML; MG/100ML; MG/100ML; MG/100ML
INJECTION, SOLUTION INTRAVENOUS CONTINUOUS
Status: DISCONTINUED | OUTPATIENT
Start: 2019-11-03 | End: 2019-11-03 | Stop reason: HOSPADM

## 2019-11-03 RX ORDER — HYDROMORPHONE HYDROCHLORIDE 1 MG/ML
0.2 INJECTION, SOLUTION INTRAMUSCULAR; INTRAVENOUS; SUBCUTANEOUS EVERY 5 MIN PRN
Status: DISCONTINUED | OUTPATIENT
Start: 2019-11-03 | End: 2019-11-03 | Stop reason: HOSPADM

## 2019-11-03 RX ORDER — MORPHINE SULFATE 10 MG/ML
6 INJECTION, SOLUTION INTRAMUSCULAR; INTRAVENOUS EVERY 10 MIN PRN
Status: DISCONTINUED | OUTPATIENT
Start: 2019-11-03 | End: 2019-11-03 | Stop reason: HOSPADM

## 2019-11-03 RX ORDER — LIDOCAINE HYDROCHLORIDE 10 MG/ML
INJECTION, SOLUTION EPIDURAL; INFILTRATION; INTRACAUDAL; PERINEURAL AS NEEDED
Status: DISCONTINUED | OUTPATIENT
Start: 2019-11-03 | End: 2019-11-03 | Stop reason: SURG

## 2019-11-03 RX ORDER — METOPROLOL SUCCINATE 50 MG/1
50 TABLET, EXTENDED RELEASE ORAL
Status: DISCONTINUED | OUTPATIENT
Start: 2019-11-03 | End: 2019-11-05

## 2019-11-03 RX ORDER — LIDOCAINE HYDROCHLORIDE 20 MG/ML
20 JELLY TOPICAL ONCE
Status: COMPLETED | OUTPATIENT
Start: 2019-11-03 | End: 2019-11-03

## 2019-11-03 RX ORDER — LIDOCAINE HYDROCHLORIDE 20 MG/ML
JELLY TOPICAL AS NEEDED
Status: DISCONTINUED | OUTPATIENT
Start: 2019-11-03 | End: 2019-11-03 | Stop reason: HOSPADM

## 2019-11-03 RX ORDER — DULOXETIN HYDROCHLORIDE 30 MG/1
60 CAPSULE, DELAYED RELEASE ORAL DAILY
Status: DISCONTINUED | OUTPATIENT
Start: 2019-11-04 | End: 2019-11-05

## 2019-11-03 RX ORDER — FOLIC ACID 1 MG/1
1 TABLET ORAL 2 TIMES DAILY
Status: DISCONTINUED | OUTPATIENT
Start: 2019-11-04 | End: 2019-11-05

## 2019-11-03 RX ORDER — DEXTROSE MONOHYDRATE 25 G/50ML
50 INJECTION, SOLUTION INTRAVENOUS
Status: DISCONTINUED | OUTPATIENT
Start: 2019-11-03 | End: 2019-11-03 | Stop reason: HOSPADM

## 2019-11-03 RX ORDER — HYDROCODONE BITARTRATE AND ACETAMINOPHEN 10; 325 MG/1; MG/1
1 TABLET ORAL EVERY 4 HOURS PRN
Status: DISCONTINUED | OUTPATIENT
Start: 2019-11-03 | End: 2019-11-05

## 2019-11-03 RX ADMIN — LIDOCAINE HYDROCHLORIDE 4 ML: 40 SOLUTION TOPICAL at 17:53:00

## 2019-11-03 RX ADMIN — SODIUM CHLORIDE: 9 INJECTION, SOLUTION INTRAVENOUS at 18:57:00

## 2019-11-03 RX ADMIN — ONDANSETRON 4 MG: 2 INJECTION INTRAMUSCULAR; INTRAVENOUS at 17:53:00

## 2019-11-03 RX ADMIN — SODIUM CHLORIDE: 9 INJECTION, SOLUTION INTRAVENOUS at 17:53:00

## 2019-11-03 RX ADMIN — EPHEDRINE SULFATE 10 MG: 50 INJECTION, SOLUTION INTRAVENOUS at 18:07:00

## 2019-11-03 RX ADMIN — EPHEDRINE SULFATE 10 MG: 50 INJECTION, SOLUTION INTRAVENOUS at 17:59:00

## 2019-11-03 RX ADMIN — DEXAMETHASONE SODIUM PHOSPHATE 4 MG: 4 MG/ML VIAL (ML) INJECTION at 17:53:00

## 2019-11-03 RX ADMIN — LIDOCAINE HYDROCHLORIDE 50 MG: 10 INJECTION, SOLUTION EPIDURAL; INFILTRATION; INTRACAUDAL; PERINEURAL at 17:53:00

## 2019-11-03 RX ADMIN — PHENYLEPHRINE HCL 200 MCG: 10 MG/ML VIAL (ML) INJECTION at 18:49:00

## 2019-11-03 NOTE — ED NOTES
24 fr campo 3-way catheter inserted for CBI. Approximately 350ml of bloody urine w/ clots output immediately. Connected to CBI. Approximately 100 ml of irrigation NS infused in catheter when patient became obstructed again. Manual irrigation attempted.

## 2019-11-03 NOTE — ED PROVIDER NOTES
Patient Seen in: [facility] AND [facility] Emergency Department      History   Patient presents with:  Urinary Symptoms (urologic)    Stated Complaint: HEMATURIA    HPI    60-year-old male with diabetes on insulin, A. fib on Coumadin, pacemaker, status post PVP 171/102   Pulse 74   Temp 97.7 °F (36.5 °C) (Oral)   Resp 24   SpO2 96%         Physical Exam  Vitals signs and nursing note reviewed. Constitutional:       Appearance: He is well-developed. HENT:      Head: Normocephalic and atraumatic.    Neck:      M GLUCOSE - Abnormal; Notable for the following components:    POC Glucose  438 (*)     All other components within normal limits   CBC W/ DIFFERENTIAL - Abnormal; Notable for the following components:    RBC 3.58 (*)     HGB 11.8 (*)     HCT 35.4 (*)     RD at 15:27     Approved by (CST): Charles Greenberg MD on 11/03/2019 at 15:40                  MDM     69-year-old male on Coumadin presenting with hematuria.   Multiple attempts at [facility] were attempted however catheter continues to clog, likely secondary to blood

## 2019-11-03 NOTE — ED NOTES
Patient complaining of 10/10pain. Unable to hold arm still for BP-198/132.  JAE Ellen Dimes states ok to give 4mg IV Morphine

## 2019-11-03 NOTE — ED NOTES
Plan for patient to go to OR from ER. ERMD states she will order FFP to put on hold for the OR (not to be given in the ER).

## 2019-11-03 NOTE — ED INITIAL ASSESSMENT (HPI)
Dalia Mosqueda is here via EMS c/o bleeding from penis, penile pain, and tremors. He self-catheterizes and symptoms began after attempting to cath himself this morning.

## 2019-11-03 NOTE — CONSULTS
[facility] Cardiology Consult Note    Brennen Mcdermott Patient Status:  Emergency    7/10/1950 MRN [id number]   Location [address] Attending Karla Kinney MD   Hosp Day # 0 PCP Robe Pendleton MD     78year old male, consult Procedure Laterality Date   • APPENDECTOMY  90667   • CONTACT LASER SURGERY OF PROSTATE  2012   • HERNIA SURGERY  2000   • OTHER SURGICAL HISTORY     • OTHER SURGICAL HISTORY Left     ankle surgery   • PACEMAKER MONITOR     • TONSILLECTOMY  1977     [facility]

## 2019-11-03 NOTE — CONSULTS
[facility] HOSP - [facility]    Report of Consultation    Tim Noel Patient Status:  Emergency    7/10/1950 MRN [id number]   Location [address] Attending Julisa Cheney MD   Hosp Day # 0 PCP Daniel Perez MD     Date and then it clotted off; hand irrigation– clots and then the catheter occluded; ran continuous bladder irrigation for about 100 cc but patient only felt that the more more bladder pain so the CBI was stopped; gross hematuria very severe; very very large am surgery   • PACEMAKER MONITOR     • TONSILLECTOMY  1977      Family History   Problem Relation Age of Onset   • Cancer Father    • Cancer Mother    • Other (rheumatoid arthritis) Mother       Social History: Social History    Tobacco Use      Smoking statu with normal affect  Exam appropriate for age; patellar reflexes--diminished bilaterally  Head/Face: normocephalic  Eyes/Vision: no scleral icterus  Neck/Thyroid: neck is supple without adenopathy  Lymphatic: no abnormal cervical, supraclavicular or inguina CLARITY Cloudy*   SPECGRAVITY 1.012   PHURINE 7.0   PROUR 100 *   GLUUR 150 *   KETUR Negative   BILUR Negative   BLOODURINE Large*   NITRITE Negative   UROBILINOGEN <2.0   LEUUR Negative   WBCUR 5   RBCUR 3,668*   BACUR Few*       IMAGING           11/3 related to the outlet obstruction versus reflux. Mild left renal atrophy. No definite obstructing  kidney stone. Left adrenal adenoma stable. 2. Moderate hepatosplenomegaly redemonstrated. Correlate clinically. Status post cholecystectomy.   Again note warfarin    11. Anticoagulated with INR greater than 2    12. Atrial fibrillation    13. Pacemaker    I fully explained to patient wife the need for emergency surgery; patient is in 10 out of 10 bladder pain and knows that he needs to undergo surgery. patient. We will perform emergency surgery today. We will follow him with you afterwards.          11/3/2019  Colan Homans, MD

## 2019-11-03 NOTE — ANESTHESIA PREPROCEDURE EVALUATION
Anesthesia PreOp Note    HPI:     Susan Gomes is a 78year old male who presents for preoperative consultation requested by: Octavio Fortune MD    Date of Surgery: 11/3/2019    Procedure(s):  CYSTOSCOPY  Indication: Hemorrhage [R58]    Relevant P two tablets in the AM and one and a half tab before dinner and two tablets at bedtime. , Disp: , Rfl:   lisinopril 40 MG Oral Tab, Take 40 mg by mouth., Disp: , Rfl:   Liraglutide 18 MG/3ML Subcutaneous Solution Pen-injector, Take 0.6 mg once daily for 1 we Tab, Take 6 tablets by mouth 2 (two) times daily. Take 2 tabs two times per day , Disp: , Rfl:   Garlic 5799 MG Oral Cap, Take  by mouth.  Take 2 tabs two times per day, Disp: , Rfl:   LANTUS SOLOSTAR 100 UNIT/ML Subcutaneous Solution Pen-injector, 50 Units Forced sexual activity: Not on file    Other Topics      Concerns:        Not on file    Social History Narrative      Not on file      Available pre-op labs reviewed.   Lab Results   Component Value Date    WBC 10.0 11/03/2019    RBC 3.58 (L) 11/03/2019 forms of anesthetic management. All of the patient's questions were answered to the best of my ability. The patient desires the anesthetic management as planned.   Alvaro Espinosa  11/3/2019 4:20 PM

## 2019-11-04 ENCOUNTER — APPOINTMENT (OUTPATIENT)
Dept: ULTRASOUND IMAGING | Facility: HOSPITAL | Age: 69
DRG: 699 | End: 2019-11-04
Attending: INTERNAL MEDICINE
Payer: MEDICARE

## 2019-11-04 PROBLEM — I42.0 DILATED CARDIOMYOPATHY (HCC): Status: ACTIVE | Noted: 2019-11-04

## 2019-11-04 PROCEDURE — 76770 US EXAM ABDO BACK WALL COMP: CPT | Performed by: INTERNAL MEDICINE

## 2019-11-04 PROCEDURE — 99231 SBSQ HOSP IP/OBS SF/LOW 25: CPT | Performed by: NURSE PRACTITIONER

## 2019-11-04 PROCEDURE — 99232 SBSQ HOSP IP/OBS MODERATE 35: CPT | Performed by: INTERNAL MEDICINE

## 2019-11-04 PROCEDURE — 99233 SBSQ HOSP IP/OBS HIGH 50: CPT | Performed by: HOSPITALIST

## 2019-11-04 NOTE — ANESTHESIA POSTPROCEDURE EVALUATION
Patient: Tami Gonzalez    Procedure Summary     Date:  11/03/19 Room / Location:  [address] [place] [address] MAIN OR 14 / [address] [place] [address] MAIN OR    Anesthesia Start:  3992 Anesthesia Stop:  1911    Procedure:  CYSTOSCOPY (N/A ) Diagnosis:       Hemorrhage      (CLOT URINARY RETENTION,

## 2019-11-04 NOTE — PROGRESS NOTES
[facility]    Cardiology Progress Note  [facility]    Clif Rivera Patient Status:  Inpatient    7/10/1950 MRN [id number]   Location [facility] 2W/SW Attending Kassandra Tineo MD   Hosp Day # 1 PCP Rodríguez Schroeder Output -- 450 --    Total Output -- 575 --       Net I/O     -- 1230 --          Vent Settings:      Hemodynamic parameters (last 24 hours):      Scheduled Meds:   • cefTRIAXone  2 g Intravenous Q24H   • finasteride  5 mg Oral Daily   • Alfuzosin HCl ER  1 amount high-density material within the urinary bladder more likely a large area of hemorrhage. Moderate air within the urinary bladder more likely from the Smith catheter and instrumentation. No significant bladder wall thickening.   Enlarged prostate as

## 2019-11-04 NOTE — PLAN OF CARE
Urine has been clear. Dr. Pires Forth, here and clamped the CBI irrigation at [phone]. There has been no s/s bleeding in the urine and it has remained pale yellow. No c/o pain voiced.  Blood sugar was elevated but mealtime bolus plus sliding s - CBI orders per Dr. Lincoln Pencil  - Home Coumadin on hold  - See additional Care Plan goals for specific interventions   Outcome: Progressing     Problem: GENITOURINARY - ADULT  Goal: Absence of urinary retention  Description  INTERVENTIONS:  - Assess georgi

## 2019-11-04 NOTE — PLAN OF CARE
Double RN skin check done prior to transfer off Unit. Skin check performed by this RN and Viji Thornton RN. Wounds are as follows: none    Will remain available for any further questions or concerns.

## 2019-11-04 NOTE — PLAN OF CARE
Received patient s/p cystoscopy w/ evacuation and removal of bladder clot. Denies pain to perineum. Chronic pain to neck managed with PRN [place]. CBI maintained throughout shift w/ no blood - see flowsheet documentation. CPAP used overnight during sleep.  No Progressing     Problem: GENITOURINARY - ADULT  Goal: Absence of urinary retention  Description  INTERVENTIONS:  - Assess patient’s ability to void and empty bladder  - Monitor intake/output and perform bladder scan as needed  - Follow urinary retention pr

## 2019-11-04 NOTE — BRIEF OP NOTE
[facility] POST ANESTHESIA CARE UNIT  Brief Op Note       Patients Name: Sweetie Franklin  Attending Physician: Ambrosio Sandy MD  Operating Physician: Waldo Pack MD  CSN: [id number]     Location:  OR  MRN: [id number]    Date of Birth: [birth year]

## 2019-11-04 NOTE — OPERATIVE REPORT
[facility]    PATIENT'S NAME: Abhinav Soto   ATTENDING PHYSICIAN: Elie Jordan MD   OPERATING PHYSICIAN: Ovi De La Rosa MD   PATIENT ACCOUNT#:   [id number]    LOCATION:  [address] [place] [address],[facility] Floor #:   [id number]       DATE OF BI our case when we removed his existing 24-Jamaican Smith, at that point the urethra calibrated to about size 22-Jamaican to 24-Jamaican. There were no other strictures in the urethra.   The patient has a very large endoscopic BPH obstruction; he has what appears bladder. I also performed fluoroscopy and the bladder was very large but was not yet reaching the expected location of the umbilicus.   Massive amounts of clot were identified and removed by tedious hand irrigation, periodically performing fluoroscopy and

## 2019-11-04 NOTE — ANESTHESIA PROCEDURE NOTES
Airway  Date/Time: 11/3/2019 5:50 PM  Urgency: Elective    Airway not difficult    General Information and Staff    Patient location during procedure: OR  Anesthesiologist: Misty Ahmadi MD  Performed: anesthesiologist     Indications and Patient Conditio

## 2019-11-04 NOTE — PROGRESS NOTES
[facility]    Progress Note    Clif Rivera Patient Status:  Inpatient    7/10/1950 MRN [id number]   Location [facility] 2W/SW Attending Mike Londono MD   Hosp Day # 1 PCP Abelardo De La Cruz MD       Subjective:   Kendy Linton appears to be a large intravesical median lobe with coarse cystitis cystica tissue lying on top of it found during cystoscopy but possibility of bladder neoplasm can not be excluded. -Finasteride 5 mg PO daily      Patient was agreeable with plan of care. performed. Moderate hydronephrosis bilaterally. Dictated by (CST): Renee Marroquin MD on 11/03/2019 at 21:57     Approved by (CST): Renee Marroquin MD on 11/03/2019 at 21:59                        Deja Olmstead, NORA  11/4/2019

## 2019-11-04 NOTE — PROGRESS NOTES
[facility]    Progress Note    Reinaldo Benoit Patient Status:  Inpatient    7/10/1950 MRN [id number]   Location [facility] 2W/SW Attending Agusto Montanez MD   Hosp Day # 1 PCP Kate Payne MD       Subjective:   Dyllan Holbrook be related to the outlet obstruction versus reflux. Mild left renal atrophy. No definite obstructing  kidney stone. Left adrenal adenoma stable. 2. Moderate hepatosplenomegaly redemonstrated. Correlate clinically. Status post cholecystectomy.   Again n ([facility]  Counseled on diet and exercise         Atrial fibrillation ([facility]  hold coumadin  Will need watchman device  Cardiology on board         Dilated cardiomyopathy ([facility]  Continue home meds     HFrEF  Continue home meds         Quality:  · DVT Prophylaxi (4) patient too young to ambulate or walks frequently

## 2019-11-04 NOTE — H&P
[address] Patel Patient Status:  Inpatient    7/10/1950 MRN [id number]   Location [facility] 2W/SW Attending Nitesh Ramos MD   Hosp Day # 0 PCP Shakir Drake MD     Date:    Date 450 MG Oral Tab 11/3/2019 at Unknown time  Yes Yes   Sig: Take 6 tablets by mouth 2 (two) times daily.  Take 6 tabs two times per day    Cyanocobalamin (B-12) 1000 MCG Oral Cap 11/3/2019 at Unknown time  Yes Yes   Sig: Take 1 capsule by mouth 2 (two) times Gluconate 595 MG Oral Cap 11/3/2019 at Unknown time  Yes Yes   Sig: Take 2 capsules by mouth daily. Ranitidine HCl (ZANTAC) 150 MG Oral Tab 11/2/2019 at Unknown time  Yes Yes   Sig: Take 150 mg by mouth nightly.    Sennosides-Docusate Sodium 8.6-50 MG O oriented. Diffuse skin problem:  None. Eye:  Pupils are equal, round and reactive to light, extraocular movements are intact, Normal conjunctiva. HENT:  Normocephalic, oral mucosa is moist.  Head:  Normocephalic, atraumatic.   Neck:  Supple, non-tender, bilaterally.           Dictated by (CST): Barbi Kent MD on 11/03/2019 at 21:57       Approved by (CST): Barbi Kent MD on 11/03/2019 at 21:59           CT ABDOMEN+PELVIS(ALL W+WO)(CPT=74178) Once [[id number]] Collected: 11/03/19 1540   Order Status: dissection. RETROPERITONEUM: No mass or enlarged adenopathy.    BOWEL/MESENTERY: Normal.  No visible mass, obstruction, or bowel wall thickening.  Appendix has been removed. .   ABDOMINAL WALL: Again noted are multiple right and left para median ventral h hernias containing fat or omentum grossly unchanged without definite bowel incarceration.    Correlate clinically.           Assessment and Plan:    Ella Chaparro is a(n) 78year old male, pmh of afib on coumadin, dilated cardiomyopathy, s/p pmk impla

## 2019-11-05 ENCOUNTER — TELEPHONE (OUTPATIENT)
Dept: SURGERY | Facility: CLINIC | Age: 69
End: 2019-11-05

## 2019-11-05 VITALS
DIASTOLIC BLOOD PRESSURE: 48 MMHG | RESPIRATION RATE: 18 BRPM | WEIGHT: 258.38 LBS | OXYGEN SATURATION: 98 % | TEMPERATURE: 98 F | HEART RATE: 70 BPM | SYSTOLIC BLOOD PRESSURE: 160 MMHG | HEIGHT: 72 IN | BODY MASS INDEX: 35 KG/M2

## 2019-11-05 PROCEDURE — 99232 SBSQ HOSP IP/OBS MODERATE 35: CPT | Performed by: INTERNAL MEDICINE

## 2019-11-05 PROCEDURE — 99239 HOSP IP/OBS DSCHRG MGMT >30: CPT | Performed by: HOSPITALIST

## 2019-11-05 PROCEDURE — 99231 SBSQ HOSP IP/OBS SF/LOW 25: CPT | Performed by: NURSE PRACTITIONER

## 2019-11-05 RX ORDER — FINASTERIDE 5 MG/1
5 TABLET, FILM COATED ORAL DAILY
Qty: 90 TABLET | Refills: 0 | Status: SHIPPED | OUTPATIENT
Start: 2019-11-06 | End: 2020-02-03

## 2019-11-05 NOTE — PLAN OF CARE
Problem: Patient Centered Care  Goal: Patient preferences are identified and integrated in the patient's plan of care  Description  Interventions:  - What would you like us to know as we care for you? \"I love wearing my CPAP to sleep. \"  - Provide timel pharmacy to review patient's medication profile  - Implement strategies to promote bladder emptying  Outcome: Progressing     Problem: HEMATOLOGIC - ADULT  Goal: Free from bleeding injury  Description  (Example usage: patient with low platelets)  INTERVENT

## 2019-11-05 NOTE — PROGRESS NOTES
[facility]    Cardiology Progress Note  [facility]    Justyna Alvarado Patient Status:  Inpatient    7/10/1950 MRN [id number]   Location [facility] 5SW/SE Attending Jenny Patterson MD   Hosp Day # 2 PCP J with breakfast   • Senna-Docusate Sodium  2 tablet Oral BID   • famoTIDine  20 mg Oral Daily   • lisinopril  40 mg Oral Daily   • insulin detemir  50 Units Subcutaneous Daily   • folic acid  1 mg Oral BID   • DULoxetine HCl  60 mg Oral Daily   • diphenhydr No significant bladder wall thickening. Enlarged prostate as discussed above. Moderate bilateral hydronephrosis and hydroureter may be related to the outlet obstruction versus reflux. Mild left renal atrophy. No definite obstructing  kidney stone.   Rei Swartz

## 2019-11-05 NOTE — PROGRESS NOTES
Angela marquez   Chart reviewed   Permanent afib - good candidate to stop AC d/t chronic need for self cath and hematuria anemia   chadsvasc = 3 at least (chf, age, htn)     Will need screening TETE when stable to qualify

## 2019-11-05 NOTE — PROGRESS NOTES
Patient dc home. IV to be removed by ALPESH camp. Understands to follow up with PCP in 1 week. Patient understands he needs to bring in the script for insulin to his pharmacy. Pt states he had a campo before and knows how to care for campo.  This RN did [facility]

## 2019-11-05 NOTE — DISCHARGE SUMMARY
[facility]    Discharge Summary    Neil Alejo Patient Status:  Inpatient    7/10/1950 MRN [id number]   Location [facility] 5SW/SE Attending Ruby Bradford MD   Hosp Day # 2 PCP Samira Cisse MD     Date of Admission:  Gross hematuria  Resolved  Coumadin on hold for one week  Patient will follow up with cardiology for TETE and watchman device then be on coumadin for 6 weeks then stop coumadin all together.     Finasteride 5 mg daily   Replace 3 way campo with 18 fr coude dinner    diphenhydrAMINE-APAP, sleep,  MG Oral Cap  Take 3 tablets by mouth nightly. Sennosides-Docusate Sodium 8.6-50 MG Oral Tab  Take 2 tablets by mouth 2 (two) times daily.       HYDROcodone-acetaminophen  MG Oral Tab  Take 2 tablets in Units every morning. tamsulosin HCl (FLOMAX) 0.4 MG Oral Cap  2 tablets nightly. !! - Potential duplicate medications found. Please discuss with provider.               Discharge Diet: ADA diet    Discharge Activity: As tolerated       Discharge M Caps  Commonly known as:  MICROZIDE      Take 12.5 mg by mouth 2 (two) times daily.    Refills:  0     HYDROcodone-acetaminophen  MG Tabs  Commonly known as:  NORCO      Take 2 tablets in am, 1 tablet in afternoon and 2 tablets in pm   Stop taking on: [phone] [address]    Phone:  [phone]   · finasteride 5 MG Tabs     Please  your prescriptions at the location directed by your doctor or nurse    Bring a paper prescription for each of these medications  ·

## 2019-11-05 NOTE — PROGRESS NOTES
Patient discharged home. Discharge teaching done by ALPESH Morton. 3 way campo catheter removed as ordered and 18 F coude inserted. Patient tolerated well. Urine draining yellow/clear urine. No clots or bleeding noted. Campo care teaching done.  Patient has le

## 2019-11-05 NOTE — PLAN OF CARE
Problem: Patient/Family Goals  Goal: Patient/Family Long Term Goal  Description  Patient's Long Term Goal: Go home    Interventions:  - Follow MD orders  - Compliance with POC  - Medications as prescribed  - Monitor for home going needs  - See additional is appears caroline at this time. Patient given [place] for knee pain. Monitoring will continue.

## 2019-11-05 NOTE — PROGRESS NOTES
[facility]    Progress Note    Tami Gonzalez Patient Status:  Inpatient    7/10/1950 MRN [id number]   Location [facility] 5SW/SE Attending Rod Reed MD   Hosp Day # 2 PCP Gabriel Daniel MD       Subjective:   Nevaeh Harris eventual TURB due to what appears to be a large intravesical median lobe with coarse cystitis cystica tissue lying on top of it found during cystoscopy but possibility of bladder neoplasm can not be excluded.   -Finasteride 5 mg PO daily    -Home with 18 fr 15:27     Approved by (CST): Bird Rodríguez MD on 11/03/2019 at 15:40          Xr Or - N/c    Result Date: 11/3/2019  CONCLUSION:  1. Cystoscopy performed. Moderate hydronephrosis bilaterally.     Dictated by (CST): Bird Rodríguez MD on 11/03/2019 at 21:57

## 2019-11-06 ENCOUNTER — TELEPHONE (OUTPATIENT)
Dept: INTERNAL MEDICINE UNIT | Facility: HOSPITAL | Age: 69
End: 2019-11-06

## 2019-11-06 ENCOUNTER — TELEPHONE (OUTPATIENT)
Dept: MEDSURG UNIT | Facility: HOSPITAL | Age: 69
End: 2019-11-06

## 2019-11-06 RX ORDER — INSULIN LISPRO 100 [IU]/ML
15 INJECTION, SOLUTION INTRAVENOUS; SUBCUTANEOUS
Status: ON HOLD | COMMUNITY
End: 2020-03-06

## 2019-11-06 NOTE — TELEPHONE ENCOUNTER
[facility] Pharmacist calling to request changing Novolog prescribed by Dr Odalys Ricks to Humalog due to pts insurance prescription plan. Discussed w MARGE Martin whom approved the change.

## 2019-11-07 ENCOUNTER — TELEPHONE (OUTPATIENT)
Dept: CARDIOLOGY | Age: 69
End: 2019-11-07

## 2019-11-08 ENCOUNTER — OFFICE VISIT (OUTPATIENT)
Dept: SURGERY | Facility: CLINIC | Age: 69
End: 2019-11-08
Payer: MEDICARE

## 2019-11-08 ENCOUNTER — TELEPHONE (OUTPATIENT)
Dept: SURGERY | Facility: CLINIC | Age: 69
End: 2019-11-08

## 2019-11-08 VITALS — TEMPERATURE: 98 F | SYSTOLIC BLOOD PRESSURE: 188 MMHG | DIASTOLIC BLOOD PRESSURE: 74 MMHG | HEART RATE: 70 BPM

## 2019-11-08 DIAGNOSIS — R31.0 GROSS HEMATURIA: Primary | ICD-10-CM

## 2019-11-08 DIAGNOSIS — R33.9 URINARY RETENTION: ICD-10-CM

## 2019-11-08 DIAGNOSIS — Z97.8 FOLEY CATHETER IN PLACE: ICD-10-CM

## 2019-11-08 PROCEDURE — 99213 OFFICE O/P EST LOW 20 MIN: CPT | Performed by: NURSE PRACTITIONER

## 2019-11-08 PROCEDURE — G0463 HOSPITAL OUTPT CLINIC VISIT: HCPCS | Performed by: NURSE PRACTITIONER

## 2019-11-08 NOTE — TELEPHONE ENCOUNTER
S/W ALBERT and she asked if I could contact pt and inform him that after reviewing Dr. Howard Albert notes in cardiology that he states that pt can resume coumadin as per urology's direction. She would like to know who check's pt's INR's and if this is the PCP then pt needs to contact the PCP's office to inform that he is restarting the Coumadin so they can arrange his INR testing. I tried to reach pt to inform him of this and had to  [facility].

## 2019-11-08 NOTE — PROGRESS NOTES
HPI:    Patient ID: Victorina Balderas is a 78year old male. HPI     Patient is a 78year old male who presents to the clinic for a follow up. Patient was recently hospitalized 11/3/19 with gross hematuria.   This was unable to be treated with [place] Gluconate 595 MG Oral Cap Take 2 capsules by mouth daily. • FOLIC ACID 1 MG Oral Tab TAKE TWO TABLETS BY MOUTH ONCE DAILY (Patient taking differently: Take 1 mg by mouth 2 (two) times daily.   ) 180 tablet 3   • Glucose Blood (BLOOD GLUCOSE TEST) In V APPENDECTOMY  [id number]   • CHOLECYSTECTOMY     • CONTACT LASER SURGERY OF PROSTATE  2012   • CYSTOSCOPY N/A 11/3/2019    Performed by Osmani Butler MD at [facility] OR   • HERNIA SURGERY  2000   • OTHER SURGICAL HISTORY     • OTHER SURGICAL HISTORY Left recommended he follow up with cardiology after return to discuss time frame regarding these events. Patient would need to be off anticoagulants for TURB procedure so if this is going to be done in the next month or so would hold off on TURB until then.   H

## 2019-11-14 ENCOUNTER — TELEPHONE (OUTPATIENT)
Dept: INTERVENTIONAL RADIOLOGY/VASCULAR | Facility: HOSPITAL | Age: 69
End: 2019-11-14

## 2019-11-18 ENCOUNTER — TELEPHONE (OUTPATIENT)
Dept: INTERVENTIONAL RADIOLOGY/VASCULAR | Facility: HOSPITAL | Age: 69
End: 2019-11-18

## 2019-11-18 NOTE — TELEPHONE ENCOUNTER
Pt returned call for Watchmarcellus. James date of 12/3 at [facility].  Will set up pre and post TETE's.   Pt v/u

## 2019-11-20 ENCOUNTER — TELEPHONE (OUTPATIENT)
Dept: CARDIOLOGY | Age: 69
End: 2019-11-20

## 2019-11-21 DIAGNOSIS — I48.20 CHRONIC ATRIAL FIBRILLATION (CMD): Primary | ICD-10-CM

## 2019-11-21 RX ORDER — WARFARIN SODIUM 5 MG/1
5 TABLET ORAL NIGHTLY
COMMUNITY
End: 2020-02-20

## 2019-11-21 RX ORDER — HYDROCODONE BITARTRATE AND ACETAMINOPHEN 10; 325 MG/1; MG/1
2 TABLET ORAL 2 TIMES DAILY
COMMUNITY
End: 2019-11-29

## 2019-11-22 ENCOUNTER — TELEPHONE (OUTPATIENT)
Dept: CARDIOLOGY | Age: 69
End: 2019-11-22

## 2019-11-25 ENCOUNTER — HOSPITAL ENCOUNTER (OUTPATIENT)
Dept: CV DIAGNOSTICS | Facility: HOSPITAL | Age: 69
Discharge: HOME | End: 2019-11-25
Attending: INTERNAL MEDICINE
Payer: MEDICARE

## 2019-11-25 ENCOUNTER — HOSPITAL ENCOUNTER (OUTPATIENT)
Dept: INTERVENTIONAL RADIOLOGY/VASCULAR | Facility: HOSPITAL | Age: 69
Discharge: HOME | End: 2019-11-25
Attending: INTERNAL MEDICINE | Admitting: INTERNAL MEDICINE
Payer: MEDICARE

## 2019-11-25 VITALS
WEIGHT: 265 LBS | BODY MASS INDEX: 36 KG/M2 | OXYGEN SATURATION: 93 % | DIASTOLIC BLOOD PRESSURE: 67 MMHG | RESPIRATION RATE: 14 BRPM | HEART RATE: 70 BPM | SYSTOLIC BLOOD PRESSURE: 155 MMHG

## 2019-11-25 DIAGNOSIS — I44.7 LBBB (LEFT BUNDLE BRANCH BLOCK): ICD-10-CM

## 2019-11-25 DIAGNOSIS — I48.91 ATRIAL FIBRILLATION (HCC): Primary | ICD-10-CM

## 2019-11-25 DIAGNOSIS — I48.91 A-FIB (HCC): ICD-10-CM

## 2019-11-25 DIAGNOSIS — I50.20 HF (HEART FAILURE), SYSTOLIC (HCC): ICD-10-CM

## 2019-11-25 DIAGNOSIS — Z01.818 PRE-OP TESTING: ICD-10-CM

## 2019-11-25 LAB
ABSOLUTE IMMATURE GRANULOCYTES (OFFPRE24): NORMAL
ANION GAP SERPL CALC-SCNC: NORMAL MMOL/L
BASO+EOS+MONOS # BLD: NORMAL 10*3/UL
BASO+EOS+MONOS NFR BLD: NORMAL %
BASOPHILS # BLD: NORMAL 10*3/UL
BASOPHILS NFR BLD: NORMAL %
BUN SERPL-MCNC: 24 MG/DL
BUN/CREAT SERPL: NORMAL
CALCIUM SERPL-MCNC: 8.4 MG/DL
CHLORIDE SERPL-SCNC: 110 MMOL/L
CO2 SERPL-SCNC: NORMAL MMOL/L
CREAT SERPL-MCNC: 1.39 MG/DL
DIFFERENTIAL METHOD BLD: NORMAL
EOSINOPHIL # BLD: NORMAL 10*3/UL
EOSINOPHIL NFR BLD: NORMAL %
ERYTHROCYTE [DISTWIDTH] IN BLOOD: NORMAL %
GLUCOSE SERPL-MCNC: 128 MG/DL
HCT VFR BLD CALC: 32.8 %
HGB BLD-MCNC: 10.8 G/DL
IMMATURE GRANULOCYTES (OFFPRE25): NORMAL
LENGTH OF FAST TIME PATIENT: NORMAL H
LYMPHOCYTES # BLD: NORMAL 10*3/UL
LYMPHOCYTES NFR BLD: NORMAL %
MCH RBC QN AUTO: NORMAL PG
MCHC RBC AUTO-ENTMCNC: NORMAL G/DL
MCV RBC AUTO: NORMAL FL
MONOCYTES # BLD: NORMAL 10*3/UL
MONOCYTES NFR BLD: NORMAL %
MPV (OFFPRE2): NORMAL
NEUTROPHILS # BLD: NORMAL 10*3/UL
NEUTROPHILS NFR BLD: NORMAL %
NRBC BLD MANUAL-RTO: NORMAL %
PLAT MORPH BLD: NORMAL
PLATELET # BLD: 122 10*3/UL
POTASSIUM SERPL-SCNC: 3.6 MMOL/L
RBC # BLD: 3.3 10*6/UL
RBC MORPH BLD: NORMAL
SODIUM SERPL-SCNC: 143 MMOL/L
WBC # BLD: 10 10*3/UL
WBC MORPH BLD: NORMAL

## 2019-11-25 PROCEDURE — 86850 RBC ANTIBODY SCREEN: CPT | Performed by: INTERNAL MEDICINE

## 2019-11-25 PROCEDURE — 93312 ECHO TRANSESOPHAGEAL: CPT | Performed by: INTERNAL MEDICINE

## 2019-11-25 PROCEDURE — 86900 BLOOD TYPING SEROLOGIC ABO: CPT | Performed by: INTERNAL MEDICINE

## 2019-11-25 PROCEDURE — 93320 DOPPLER ECHO COMPLETE: CPT | Performed by: INTERNAL MEDICINE

## 2019-11-25 PROCEDURE — 99152 MOD SED SAME PHYS/QHP 5/>YRS: CPT

## 2019-11-25 PROCEDURE — 86901 BLOOD TYPING SEROLOGIC RH(D): CPT | Performed by: INTERNAL MEDICINE

## 2019-11-25 PROCEDURE — 93325 DOPPLER ECHO COLOR FLOW MAPG: CPT | Performed by: INTERNAL MEDICINE

## 2019-11-25 PROCEDURE — 80048 BASIC METABOLIC PNL TOTAL CA: CPT | Performed by: INTERNAL MEDICINE

## 2019-11-25 PROCEDURE — 85027 COMPLETE CBC AUTOMATED: CPT | Performed by: INTERNAL MEDICINE

## 2019-11-25 PROCEDURE — 93312 ECHO TRANSESOPHAGEAL: CPT

## 2019-11-25 PROCEDURE — B24BZZ4 ULTRASONOGRAPHY OF HEART WITH AORTA, TRANSESOPHAGEAL: ICD-10-PCS | Performed by: INTERNAL MEDICINE

## 2019-11-25 PROCEDURE — 82962 GLUCOSE BLOOD TEST: CPT

## 2019-11-25 RX ORDER — MIDAZOLAM HYDROCHLORIDE 1 MG/ML
INJECTION INTRAMUSCULAR; INTRAVENOUS
Status: COMPLETED
Start: 2019-11-25 | End: 2019-11-25

## 2019-11-25 RX ORDER — SODIUM CHLORIDE 9 MG/ML
INJECTION, SOLUTION INTRAVENOUS
Status: DISCONTINUED | OUTPATIENT
Start: 2019-11-25 | End: 2019-11-25

## 2019-11-25 RX ORDER — MIDAZOLAM HYDROCHLORIDE 1 MG/ML
1 INJECTION INTRAMUSCULAR; INTRAVENOUS AS NEEDED
Status: DISCONTINUED | OUTPATIENT
Start: 2019-11-25 | End: 2019-11-25

## 2019-11-25 RX ORDER — LIDOCAINE HYDROCHLORIDE 20 MG/ML
SOLUTION OROPHARYNGEAL
Status: COMPLETED
Start: 2019-11-25 | End: 2019-11-25

## 2019-11-25 RX ADMIN — MIDAZOLAM HYDROCHLORIDE 5 MG: 1 INJECTION INTRAMUSCULAR; INTRAVENOUS at 09:15:00

## 2019-11-25 RX ADMIN — LIDOCAINE HYDROCHLORIDE: 20 SOLUTION OROPHARYNGEAL at 09:40:00

## 2019-11-25 NOTE — IVS NOTE
Patient is able to sit up and ambulate without difficulty. Patient is able to tolerate food and fluids. Vital signs remain stable at this time. Dr. Rosalba Garza spoke with patient and family post procedure.   Instructions provided and patient/family verbalize un

## 2019-11-26 ENCOUNTER — TELEPHONE (OUTPATIENT)
Dept: CARDIOLOGY | Age: 69
End: 2019-11-26

## 2019-11-26 ENCOUNTER — CLINICAL ABSTRACT (OUTPATIENT)
Dept: CARDIOLOGY | Age: 69
End: 2019-11-26

## 2019-11-27 ENCOUNTER — TELEPHONE (OUTPATIENT)
Dept: INTERVENTIONAL RADIOLOGY/VASCULAR | Facility: HOSPITAL | Age: 69
End: 2019-11-27

## 2019-11-27 NOTE — TELEPHONE ENCOUNTER
Will call 12/2 w/ TOA for Watchman procedure. LD Coumadin 11/30. Do not take any meds am of procedure.

## 2019-11-29 ENCOUNTER — APPOINTMENT (OUTPATIENT)
Dept: LAB | Facility: HOSPITAL | Age: 69
End: 2019-11-29
Attending: INTERNAL MEDICINE
Payer: MEDICARE

## 2019-11-29 ENCOUNTER — OFFICE VISIT (OUTPATIENT)
Dept: RHEUMATOLOGY | Facility: CLINIC | Age: 69
End: 2019-11-29
Payer: MEDICARE

## 2019-11-29 ENCOUNTER — TELEPHONE (OUTPATIENT)
Dept: CARDIOLOGY CLINIC | Facility: HOSPITAL | Age: 69
End: 2019-11-29

## 2019-11-29 VITALS
RESPIRATION RATE: 18 BRPM | DIASTOLIC BLOOD PRESSURE: 80 MMHG | WEIGHT: 265 LBS | SYSTOLIC BLOOD PRESSURE: 177 MMHG | HEIGHT: 72 IN | HEART RATE: 72 BPM | BODY MASS INDEX: 35.89 KG/M2

## 2019-11-29 DIAGNOSIS — R70.0 ELEVATED SED RATE: ICD-10-CM

## 2019-11-29 DIAGNOSIS — M15.9 OSTEOARTHRITIS OF MULTIPLE JOINTS, UNSPECIFIED OSTEOARTHRITIS TYPE: ICD-10-CM

## 2019-11-29 DIAGNOSIS — Z51.81 THERAPEUTIC DRUG MONITORING: ICD-10-CM

## 2019-11-29 DIAGNOSIS — M35.3 PMR (POLYMYALGIA RHEUMATICA) (HCC): Primary | ICD-10-CM

## 2019-11-29 PROCEDURE — G0463 HOSPITAL OUTPT CLINIC VISIT: HCPCS | Performed by: INTERNAL MEDICINE

## 2019-11-29 PROCEDURE — 99213 OFFICE O/P EST LOW 20 MIN: CPT | Performed by: INTERNAL MEDICINE

## 2019-11-29 PROCEDURE — 36415 COLL VENOUS BLD VENIPUNCTURE: CPT | Performed by: INTERNAL MEDICINE

## 2019-11-29 PROCEDURE — 85652 RBC SED RATE AUTOMATED: CPT | Performed by: INTERNAL MEDICINE

## 2019-11-29 PROCEDURE — 86140 C-REACTIVE PROTEIN: CPT | Performed by: INTERNAL MEDICINE

## 2019-11-29 RX ORDER — HYDROCODONE BITARTRATE AND ACETAMINOPHEN 10; 325 MG/1; MG/1
TABLET ORAL
Qty: 150 TABLET | Refills: 0 | Status: ON HOLD | OUTPATIENT
Start: 2020-01-31 | End: 2019-12-04

## 2019-11-29 RX ORDER — HYDROCODONE BITARTRATE AND ACETAMINOPHEN 10; 325 MG/1; MG/1
TABLET ORAL
Qty: 150 TABLET | Refills: 0 | Status: SHIPPED | OUTPATIENT
Start: 2019-11-29 | End: 2019-12-29

## 2019-11-29 RX ORDER — HYDROCODONE BITARTRATE AND ACETAMINOPHEN 10; 325 MG/1; MG/1
TABLET ORAL
Qty: 150 TABLET | Refills: 0 | Status: ON HOLD | OUTPATIENT
Start: 2019-12-30 | End: 2019-12-04

## 2019-11-29 NOTE — PROGRESS NOTES
Victorina Balderas is a 78year old male who presents for Patient presents with:  PMR  Medication Follow-Up  . HPI:     He is a pleasant 75 year who has elevated sed rate, PMR and fibromyalgia with osteoarthirtis chronic pain. He is here on  11/14/2018. He had an accident in his back yard - he had right knee meniscal tear and he hurt his neck. He couln't move his head. He went to see Dr. Kerry Garay and he couldn't walk on his knee. He was sent to ortho.  He had to go back up on his norco b/c he needed more Patient presents for follow-up of PMR versus seronegative RA  He currently is not on any medications. He is intolerable to prednisone due to his diabetes. He also declined Actemra.   He could not tolerate methotrexate, Arava or Imuran  Currently on Norco • Glucose Blood (BLOOD GLUCOSE TEST) In Vitro Strip Test three times daily as directed. • Insulin Pen Needle (PEN NEEDLES 3/16\") 31G X 5 MM Does not apply Misc Use three times daily with insulin injections.      • Metoprolol Succinate ER 50 MG Oral Tab • CYSTOSCOPY N/A 11/3/2019    Performed by Demarcus Salcido MD at [facility] OR   • HERNIA SURGERY  2000   • OTHER SURGICAL HISTORY     • OTHER SURGICAL HISTORY Left     ankle surgery   • PACEMAKER MONITOR     • TONSILLECTOMY  1977      Family History   Pr 1. Minimal degenerative change both hips. 2. Moderate degenerative arthritis lower lumbar spine.       [facility]:        ASSESSMENT AND PLAN:   Devon Steiner is a 78year old male who presents for Patient presents with:  PMR  Medication Fo

## 2019-12-03 ENCOUNTER — HOSPITAL ENCOUNTER (INPATIENT)
Dept: INTERVENTIONAL RADIOLOGY/VASCULAR | Facility: HOSPITAL | Age: 69
LOS: 1 days | Discharge: HOME | DRG: 274 | End: 2019-12-04
Attending: INTERNAL MEDICINE | Admitting: INTERNAL MEDICINE
Payer: MEDICARE

## 2019-12-03 ENCOUNTER — ANESTHESIA EVENT (OUTPATIENT)
Dept: INTERVENTIONAL RADIOLOGY/VASCULAR | Facility: HOSPITAL | Age: 69
DRG: 274 | End: 2019-12-03
Payer: MEDICARE

## 2019-12-03 ENCOUNTER — APPOINTMENT (OUTPATIENT)
Dept: CV DIAGNOSTICS | Facility: HOSPITAL | Age: 69
DRG: 274 | End: 2019-12-03
Attending: INTERNAL MEDICINE
Payer: MEDICARE

## 2019-12-03 ENCOUNTER — APPOINTMENT (OUTPATIENT)
Dept: GENERAL RADIOLOGY | Facility: HOSPITAL | Age: 69
DRG: 274 | End: 2019-12-03
Attending: INTERNAL MEDICINE
Payer: MEDICARE

## 2019-12-03 DIAGNOSIS — I48.91 ATRIAL FIBRILLATION, UNSPECIFIED TYPE (HCC): ICD-10-CM

## 2019-12-03 PROBLEM — Z87.898 HISTORY OF GROSS HEMATURIA: Status: ACTIVE | Noted: 2019-12-03

## 2019-12-03 PROBLEM — Z87.448 HISTORY OF GROSS HEMATURIA: Status: ACTIVE | Noted: 2019-12-03

## 2019-12-03 PROCEDURE — 02L73DK OCCLUSION OF LEFT ATRIAL APPENDAGE WITH INTRALUMINAL DEVICE, PERCUTANEOUS APPROACH: ICD-10-PCS | Performed by: INTERNAL MEDICINE

## 2019-12-03 PROCEDURE — 76376 3D RENDER W/INTRP POSTPROCES: CPT | Performed by: INTERNAL MEDICINE

## 2019-12-03 PROCEDURE — 85347 COAGULATION TIME ACTIVATED: CPT

## 2019-12-03 PROCEDURE — 33340 PERQ CLSR TCAT L ATR APNDGE: CPT

## 2019-12-03 PROCEDURE — 5A09357 ASSISTANCE WITH RESPIRATORY VENTILATION, LESS THAN 24 CONSECUTIVE HOURS, CONTINUOUS POSITIVE AIRWAY PRESSURE: ICD-10-PCS | Performed by: INTERNAL MEDICINE

## 2019-12-03 PROCEDURE — 84295 ASSAY OF SERUM SODIUM: CPT

## 2019-12-03 PROCEDURE — 87081 CULTURE SCREEN ONLY: CPT | Performed by: INTERNAL MEDICINE

## 2019-12-03 PROCEDURE — 93005 ELECTROCARDIOGRAM TRACING: CPT

## 2019-12-03 PROCEDURE — 93799 UNLISTED CV SVC/PROCEDURE: CPT | Performed by: INTERNAL MEDICINE

## 2019-12-03 PROCEDURE — B246ZZ4 ULTRASONOGRAPHY OF RIGHT AND LEFT HEART, TRANSESOPHAGEAL: ICD-10-PCS | Performed by: INTERNAL MEDICINE

## 2019-12-03 PROCEDURE — 82330 ASSAY OF CALCIUM: CPT

## 2019-12-03 PROCEDURE — 86850 RBC ANTIBODY SCREEN: CPT | Performed by: INTERNAL MEDICINE

## 2019-12-03 PROCEDURE — 82803 BLOOD GASES ANY COMBINATION: CPT

## 2019-12-03 PROCEDURE — 94660 CPAP INITIATION&MGMT: CPT

## 2019-12-03 PROCEDURE — 93010 ELECTROCARDIOGRAM REPORT: CPT | Performed by: INTERNAL MEDICINE

## 2019-12-03 PROCEDURE — 86920 COMPATIBILITY TEST SPIN: CPT

## 2019-12-03 PROCEDURE — 82962 GLUCOSE BLOOD TEST: CPT

## 2019-12-03 PROCEDURE — 93355 ECHO TRANSESOPHAGEAL (TEE): CPT | Performed by: INTERNAL MEDICINE

## 2019-12-03 PROCEDURE — 86900 BLOOD TYPING SEROLOGIC ABO: CPT | Performed by: INTERNAL MEDICINE

## 2019-12-03 PROCEDURE — 86901 BLOOD TYPING SEROLOGIC RH(D): CPT | Performed by: INTERNAL MEDICINE

## 2019-12-03 PROCEDURE — 84132 ASSAY OF SERUM POTASSIUM: CPT

## 2019-12-03 PROCEDURE — 33340 PERQ CLSR TCAT L ATR APNDGE: CPT | Performed by: INTERNAL MEDICINE

## 2019-12-03 PROCEDURE — 71045 X-RAY EXAM CHEST 1 VIEW: CPT | Performed by: INTERNAL MEDICINE

## 2019-12-03 PROCEDURE — 85014 HEMATOCRIT: CPT

## 2019-12-03 RX ORDER — HEPARIN SODIUM 5000 [USP'U]/ML
INJECTION, SOLUTION INTRAVENOUS; SUBCUTANEOUS
Status: COMPLETED
Start: 2019-12-03 | End: 2019-12-03

## 2019-12-03 RX ORDER — HYDRALAZINE HYDROCHLORIDE 20 MG/ML
INJECTION INTRAMUSCULAR; INTRAVENOUS
Status: COMPLETED
Start: 2019-12-03 | End: 2019-12-03

## 2019-12-03 RX ORDER — LIDOCAINE HYDROCHLORIDE 10 MG/ML
INJECTION, SOLUTION EPIDURAL; INFILTRATION; INTRACAUDAL; PERINEURAL
Status: COMPLETED
Start: 2019-12-03 | End: 2019-12-03

## 2019-12-03 RX ORDER — ENALAPRILAT 2.5 MG/2ML
0.62 INJECTION INTRAVENOUS ONCE
Status: COMPLETED | OUTPATIENT
Start: 2019-12-03 | End: 2019-12-03

## 2019-12-03 RX ORDER — LISINOPRIL 40 MG/1
40 TABLET ORAL DAILY
Status: DISCONTINUED | OUTPATIENT
Start: 2019-12-03 | End: 2019-12-03

## 2019-12-03 RX ORDER — FINASTERIDE 5 MG/1
5 TABLET, FILM COATED ORAL DAILY
Status: DISCONTINUED | OUTPATIENT
Start: 2019-12-03 | End: 2019-12-04

## 2019-12-03 RX ORDER — SODIUM CHLORIDE 9 MG/ML
INJECTION, SOLUTION INTRAVENOUS CONTINUOUS
Status: DISCONTINUED | OUTPATIENT
Start: 2019-12-03 | End: 2019-12-03

## 2019-12-03 RX ORDER — METOCLOPRAMIDE HYDROCHLORIDE 5 MG/ML
10 INJECTION INTRAMUSCULAR; INTRAVENOUS AS NEEDED
Status: DISCONTINUED | OUTPATIENT
Start: 2019-12-03 | End: 2019-12-03 | Stop reason: HOSPADM

## 2019-12-03 RX ORDER — LIDOCAINE HYDROCHLORIDE 10 MG/ML
INJECTION, SOLUTION EPIDURAL; INFILTRATION; INTRACAUDAL; PERINEURAL AS NEEDED
Status: DISCONTINUED | OUTPATIENT
Start: 2019-12-03 | End: 2019-12-03 | Stop reason: SURG

## 2019-12-03 RX ORDER — ROCURONIUM BROMIDE 10 MG/ML
INJECTION, SOLUTION INTRAVENOUS AS NEEDED
Status: DISCONTINUED | OUTPATIENT
Start: 2019-12-03 | End: 2019-12-03 | Stop reason: SURG

## 2019-12-03 RX ORDER — PROTAMINE SULFATE 10 MG/ML
INJECTION, SOLUTION INTRAVENOUS AS NEEDED
Status: DISCONTINUED | OUTPATIENT
Start: 2019-12-03 | End: 2019-12-03 | Stop reason: SURG

## 2019-12-03 RX ORDER — HYDRALAZINE HYDROCHLORIDE 20 MG/ML
INJECTION INTRAMUSCULAR; INTRAVENOUS EVERY 6 HOURS PRN
Status: DISCONTINUED | OUTPATIENT
Start: 2019-12-03 | End: 2019-12-03

## 2019-12-03 RX ORDER — METOPROLOL SUCCINATE 50 MG/1
50 TABLET, EXTENDED RELEASE ORAL
Status: DISCONTINUED | OUTPATIENT
Start: 2019-12-03 | End: 2019-12-04

## 2019-12-03 RX ORDER — LIDOCAINE HYDROCHLORIDE 10 MG/ML
INJECTION, SOLUTION EPIDURAL; INFILTRATION; INTRACAUDAL; PERINEURAL
Status: DISCONTINUED
Start: 2019-12-03 | End: 2019-12-03 | Stop reason: WASHOUT

## 2019-12-03 RX ORDER — DIPHENHYDRAMINE HCL 50 MG
50 CAPSULE ORAL NIGHTLY PRN
Status: DISCONTINUED | OUTPATIENT
Start: 2019-12-03 | End: 2019-12-04

## 2019-12-03 RX ORDER — INSULIN ASPART 100 [IU]/ML
INJECTION, SOLUTION INTRAVENOUS; SUBCUTANEOUS ONCE
Status: COMPLETED | OUTPATIENT
Start: 2019-12-03 | End: 2019-12-03

## 2019-12-03 RX ORDER — LABETALOL HYDROCHLORIDE 5 MG/ML
5 INJECTION, SOLUTION INTRAVENOUS EVERY 5 MIN PRN
Status: DISCONTINUED | OUTPATIENT
Start: 2019-12-03 | End: 2019-12-03 | Stop reason: HOSPADM

## 2019-12-03 RX ORDER — PROTAMINE SULFATE 10 MG/ML
INJECTION, SOLUTION INTRAVENOUS
Status: COMPLETED
Start: 2019-12-03 | End: 2019-12-03

## 2019-12-03 RX ORDER — ENALAPRILAT 2.5 MG/2ML
1.25 INJECTION INTRAVENOUS ONCE
Status: COMPLETED | OUTPATIENT
Start: 2019-12-03 | End: 2019-12-03

## 2019-12-03 RX ORDER — ONDANSETRON 2 MG/ML
4 INJECTION INTRAMUSCULAR; INTRAVENOUS AS NEEDED
Status: DISCONTINUED | OUTPATIENT
Start: 2019-12-03 | End: 2019-12-03 | Stop reason: HOSPADM

## 2019-12-03 RX ORDER — ALFUZOSIN HYDROCHLORIDE 10 MG/1
10 TABLET, EXTENDED RELEASE ORAL NIGHTLY
Status: DISCONTINUED | OUTPATIENT
Start: 2019-12-03 | End: 2019-12-04

## 2019-12-03 RX ORDER — PHENYLEPHRINE HCL 10 MG/ML
VIAL (ML) INJECTION AS NEEDED
Status: DISCONTINUED | OUTPATIENT
Start: 2019-12-03 | End: 2019-12-03 | Stop reason: SURG

## 2019-12-03 RX ORDER — WARFARIN SODIUM 5 MG/1
5 TABLET ORAL NIGHTLY
Status: DISCONTINUED | OUTPATIENT
Start: 2019-12-03 | End: 2019-12-04

## 2019-12-03 RX ORDER — FAMOTIDINE 20 MG/1
20 TABLET ORAL 2 TIMES DAILY
Status: DISCONTINUED | OUTPATIENT
Start: 2019-12-03 | End: 2019-12-04

## 2019-12-03 RX ORDER — SODIUM CHLORIDE 9 MG/ML
INJECTION, SOLUTION INTRAVENOUS CONTINUOUS
Status: DISCONTINUED | OUTPATIENT
Start: 2019-12-03 | End: 2019-12-03 | Stop reason: HOSPADM

## 2019-12-03 RX ORDER — HYDRALAZINE HYDROCHLORIDE 20 MG/ML
10 INJECTION INTRAMUSCULAR; INTRAVENOUS
Status: DISCONTINUED | OUTPATIENT
Start: 2019-12-03 | End: 2019-12-04

## 2019-12-03 RX ORDER — DIPHENHYDRAMINE HCL 25 MG
25 CAPSULE ORAL NIGHTLY
Status: DISCONTINUED | OUTPATIENT
Start: 2019-12-03 | End: 2019-12-04

## 2019-12-03 RX ORDER — LISINOPRIL 40 MG/1
40 TABLET ORAL DAILY
Status: DISCONTINUED | OUTPATIENT
Start: 2019-12-03 | End: 2019-12-04

## 2019-12-03 RX ORDER — HYDROCHLOROTHIAZIDE 12.5 MG/1
12.5 CAPSULE, GELATIN COATED ORAL 2 TIMES DAILY
Status: DISCONTINUED | OUTPATIENT
Start: 2019-12-03 | End: 2019-12-04

## 2019-12-03 RX ORDER — INSULIN ASPART 100 [IU]/ML
INJECTION, SOLUTION INTRAVENOUS; SUBCUTANEOUS
Status: COMPLETED
Start: 2019-12-03 | End: 2019-12-03

## 2019-12-03 RX ORDER — NALOXONE HYDROCHLORIDE 0.4 MG/ML
80 INJECTION, SOLUTION INTRAMUSCULAR; INTRAVENOUS; SUBCUTANEOUS AS NEEDED
Status: DISCONTINUED | OUTPATIENT
Start: 2019-12-03 | End: 2019-12-03 | Stop reason: HOSPADM

## 2019-12-03 RX ORDER — HYDROCODONE BITARTRATE AND ACETAMINOPHEN 10; 325 MG/1; MG/1
1 TABLET ORAL EVERY 6 HOURS PRN
Status: DISCONTINUED | OUTPATIENT
Start: 2019-12-03 | End: 2019-12-03

## 2019-12-03 RX ORDER — HEPARIN SODIUM 1000 [USP'U]/ML
INJECTION, SOLUTION INTRAVENOUS; SUBCUTANEOUS AS NEEDED
Status: DISCONTINUED | OUTPATIENT
Start: 2019-12-03 | End: 2019-12-03 | Stop reason: SURG

## 2019-12-03 RX ORDER — DEXTROSE MONOHYDRATE 25 G/50ML
50 INJECTION, SOLUTION INTRAVENOUS
Status: DISCONTINUED | OUTPATIENT
Start: 2019-12-03 | End: 2019-12-03 | Stop reason: HOSPADM

## 2019-12-03 RX ORDER — HYDROCODONE BITARTRATE AND ACETAMINOPHEN 10; 325 MG/1; MG/1
2 TABLET ORAL EVERY 6 HOURS PRN
Status: DISCONTINUED | OUTPATIENT
Start: 2019-12-03 | End: 2019-12-04

## 2019-12-03 RX ADMIN — PHENYLEPHRINE HCL 100 MCG: 10 MG/ML VIAL (ML) INJECTION at 09:31:00

## 2019-12-03 RX ADMIN — HEPARIN SODIUM 1000 UNITS: 1000 INJECTION, SOLUTION INTRAVENOUS; SUBCUTANEOUS at 09:38:00

## 2019-12-03 RX ADMIN — SODIUM CHLORIDE: 9 INJECTION, SOLUTION INTRAVENOUS at 08:18:00

## 2019-12-03 RX ADMIN — PHENYLEPHRINE HCL 100 MCG: 10 MG/ML VIAL (ML) INJECTION at 09:34:00

## 2019-12-03 RX ADMIN — HYDROCODONE BITARTRATE AND ACETAMINOPHEN 1 TABLET: 10; 325 TABLET ORAL at 15:10:00

## 2019-12-03 RX ADMIN — HYDROCODONE BITARTRATE AND ACETAMINOPHEN 2 TABLET: 10; 325 TABLET ORAL at 23:31:00

## 2019-12-03 RX ADMIN — DIPHENHYDRAMINE HCL 25 MG: 25 MG CAPSULE ORAL at 23:32:00

## 2019-12-03 RX ADMIN — HYDROCHLOROTHIAZIDE 12.5 MG: 12.5 CAPSULE, GELATIN COATED ORAL at 15:49:00

## 2019-12-03 RX ADMIN — LIDOCAINE HYDROCHLORIDE 50 MG: 10 INJECTION, SOLUTION EPIDURAL; INFILTRATION; INTRACAUDAL; PERINEURAL at 08:32:00

## 2019-12-03 RX ADMIN — HEPARIN SODIUM 5000 UNITS: 1000 INJECTION, SOLUTION INTRAVENOUS; SUBCUTANEOUS at 09:22:00

## 2019-12-03 RX ADMIN — HYDRALAZINE HYDROCHLORIDE 10 MG: 20 INJECTION INTRAMUSCULAR; INTRAVENOUS at 23:53:00

## 2019-12-03 RX ADMIN — HYDROCHLOROTHIAZIDE 12.5 MG: 12.5 CAPSULE, GELATIN COATED ORAL at 20:15:00

## 2019-12-03 RX ADMIN — SODIUM CHLORIDE: 9 INJECTION, SOLUTION INTRAVENOUS at 10:31:00

## 2019-12-03 RX ADMIN — PROTAMINE SULFATE 50 MG: 10 INJECTION, SOLUTION INTRAVENOUS at 09:54:00

## 2019-12-03 RX ADMIN — WARFARIN SODIUM 5 MG: 5 TABLET ORAL at 20:15:00

## 2019-12-03 RX ADMIN — METOPROLOL SUCCINATE 50 MG: 50 TABLET, EXTENDED RELEASE ORAL at 16:05:00

## 2019-12-03 RX ADMIN — ENALAPRILAT 0.62 MG: 2.5 INJECTION INTRAVENOUS at 22:37:00

## 2019-12-03 RX ADMIN — HEPARIN SODIUM 5000 UNITS: 1000 INJECTION, SOLUTION INTRAVENOUS; SUBCUTANEOUS at 09:26:00

## 2019-12-03 RX ADMIN — LISINOPRIL 40 MG: 40 TABLET ORAL at 20:15:00

## 2019-12-03 RX ADMIN — HYDRALAZINE HYDROCHLORIDE 10 MG: 20 INJECTION INTRAMUSCULAR; INTRAVENOUS at 16:54:00

## 2019-12-03 RX ADMIN — ALFUZOSIN HYDROCHLORIDE 10 MG: 10 TABLET, EXTENDED RELEASE ORAL at 20:15:00

## 2019-12-03 RX ADMIN — ENALAPRILAT 1.25 MG: 2.5 INJECTION INTRAVENOUS at 15:50:00

## 2019-12-03 RX ADMIN — ROCURONIUM BROMIDE 50 MG: 10 INJECTION, SOLUTION INTRAVENOUS at 09:20:00

## 2019-12-03 RX ADMIN — FAMOTIDINE 20 MG: 20 TABLET ORAL at 20:15:00

## 2019-12-03 RX ADMIN — ROCURONIUM BROMIDE 50 MG: 10 INJECTION, SOLUTION INTRAVENOUS at 08:32:00

## 2019-12-03 RX ADMIN — HYDRALAZINE HYDROCHLORIDE 10 MG: 20 INJECTION INTRAMUSCULAR; INTRAVENOUS at 14:01:00

## 2019-12-03 RX ADMIN — INSULIN ASPART 2 UNITS: 100 INJECTION, SOLUTION INTRAVENOUS; SUBCUTANEOUS at 10:48:00

## 2019-12-03 NOTE — ADDENDUM NOTE
Addendum  created 12/03/19 1527 by Leonardo Ervin MD    Intraprocedure Event edited, Intraprocedure Meds edited

## 2019-12-03 NOTE — ANESTHESIA POSTPROCEDURE EVALUATION
Frank 23 Patient Status:  Outpatient in a Bed   Age/Gender 78year old male MRN [id number]   Location [facility] Attending Shu Khan MD   Hosp Day # 0 PCP Shraddha Inman MD       Anesthesia

## 2019-12-03 NOTE — ANESTHESIA PROCEDURE NOTES
Airway  Date/Time: 12/3/2019 8:36 AM  Urgency: Elective    Airway not difficult    General Information and Staff    Patient location during procedure: cath lab  Anesthesiologist: Natanael Boyce MD  Performed: anesthesiologist     Indications and Patient Co

## 2019-12-03 NOTE — PROGRESS NOTES
[facility] CARDIOLOGY  TETE Note    Chan Salgado Location:      N [id number]   Admission Date 12/3/2019 Operation Date 12/3/2019   Attending Physician Kai Villalpando MD Operating Physician Amada Pantoja MD     Pre-Operative Diagnosis: Chronic a fib,

## 2019-12-03 NOTE — H&P
History & Physical Examination    Patient Name: Mckinley Soriano  MRN: [id number]  [facility]: [id number]  YOB: 1950    Diagnosis: Here for watchman placement    Present Illness: Patient is a 50-year-old gentleman with a history of dilated cardiomy time  FOLIC ACID 1 MG Oral Tab, TAKE TWO TABLETS BY MOUTH ONCE DAILY (Patient taking differently: Take 1 mg by mouth 2 (two) times daily.  ), Disp: 180 tablet, Rfl: 3, 12/2/2019 at Unknown time  Metoprolol Succinate ER 50 MG Oral Tablet 24 Hr, 50 mg 2 (two Strip, Test three times daily as directed., Disp: , Rfl: , Taking  Insulin Pen Needle (PEN NEEDLES 3/16\") 31G X 5 MM Does not apply Misc, Use three times daily with insulin injections. , Disp: , Rfl: , Taking  ULTICARE MINI PEN NEEDLES 31G X 6 MM Does not

## 2019-12-03 NOTE — ANESTHESIA PREPROCEDURE EVALUATION
PRE-OP EVALUATION    Patient Name: Justyna Alvarado    Pre-op Diagnosis: Afib    procedure: watchman    * No surgeons found in log *    Pre-op vitals reviewed.   Temp: 97.5 °F (36.4 °C)  Pulse: 70  Resp: 21  BP: 166/65  SpO2: 95 %  There is no height or w cholecalciferol 1000 UNITS Oral Cap, Take 2,000 Units by mouth daily with dinner.  4,000 units with breakfast and 2,000 units with dinner, Disp: , Rfl:   diphenhydrAMINE-APAP, sleep,  MG Oral Cap, Take 3 tablets by mouth nightly., Disp: , Rfl:   Tamara LANTUS SOLOSTAR 100 UNIT/ML Subcutaneous Solution Pen-injector, 50 Units every morning.  , Disp: , Rfl:   tamsulosin HCl (FLOMAX) 0.4 MG Oral Cap, 2 tablets nightly.  , Disp: , Rfl:         Allergies: Patient has no known allergies.       Anesthesia Evaluat 135/16.7/20.0     60/17.5/26.4.  4. Right atrium: No evidence of thrombus in the atrial cavity or     appendage. 5. Atrial septum: A septal defect cannot be excluded.  At the     superior portion of the septum appears to be color flow from     left to r  (H) 11/25/2019    CA 8.4 (L) 11/25/2019     Lab Results   Component Value Date    INR 1.5 (A) 12/03/2019         Airway      Mallampati: III  Mouth opening: 3 FB  TM distance: 4 - 6 cm  Neck ROM: full Cardiovascular      Rhythm: regular  Rate: nor

## 2019-12-03 NOTE — OPERATIVE REPORT
PROCEDURE PERFORMED:   1. Watchman device implant. 2. Transseptal catheterization with left atrial pressure recording. INDICATIONS FOR PROCEDURE: History of bleeding on Coumadin.  CHADSVasc score 3      OPERATORS: Wing Abel MD, Mone Billingsley pigtail catheter was then advanced to the distal part of the left atrial appendage. The Watchman delivery sheath was advanced over the pigtail catheter within the left atrial appendage.  The pigtail catheter was withdrawn, and the Watchman delivery sheath w recovery in stable condition. 4. CHADSVasc score 3.       [place], [address] [facility]  [address] [facility]  [phone]

## 2019-12-04 ENCOUNTER — APPOINTMENT (OUTPATIENT)
Dept: CV DIAGNOSTICS | Facility: HOSPITAL | Age: 69
DRG: 274 | End: 2019-12-04
Attending: INTERNAL MEDICINE
Payer: MEDICARE

## 2019-12-04 VITALS
HEART RATE: 70 BPM | OXYGEN SATURATION: 99 % | TEMPERATURE: 98 F | BODY MASS INDEX: 36 KG/M2 | DIASTOLIC BLOOD PRESSURE: 60 MMHG | SYSTOLIC BLOOD PRESSURE: 166 MMHG | WEIGHT: 265.44 LBS | RESPIRATION RATE: 20 BRPM

## 2019-12-04 LAB
ABSOLUTE IMMATURE GRANULOCYTES (OFFPRE24): NORMAL
ANION GAP SERPL CALC-SCNC: 7 MMOL/L
BASO+EOS+MONOS # BLD: NORMAL 10*3/UL
BASO+EOS+MONOS NFR BLD: NORMAL %
BASOPHILS # BLD: NORMAL 10*3/UL
BASOPHILS NFR BLD: NORMAL %
BUN SERPL-MCNC: 24 MG/DL
BUN/CREAT SERPL: 17.6
CALCIUM SERPL-MCNC: 8 MG/DL
CHLORIDE SERPL-SCNC: 110 MMOL/L
CO2 SERPL-SCNC: 25 MMOL/L
CREAT SERPL-MCNC: 1.36 MG/DL
DIFFERENTIAL METHOD BLD: NORMAL
EOSINOPHIL # BLD: NORMAL 10*3/UL
EOSINOPHIL NFR BLD: NORMAL %
ERYTHROCYTE [DISTWIDTH] IN BLOOD: NORMAL %
GLUCOSE SERPL-MCNC: 181 MG/DL
HCT VFR BLD CALC: 31.3 %
HGB BLD-MCNC: 10.3 G/DL
IMMATURE GRANULOCYTES (OFFPRE25): NORMAL
LENGTH OF FAST TIME PATIENT: NORMAL H
LYMPHOCYTES # BLD: NORMAL 10*3/UL
LYMPHOCYTES NFR BLD: NORMAL %
MCH RBC QN AUTO: NORMAL PG
MCHC RBC AUTO-ENTMCNC: NORMAL G/DL
MCV RBC AUTO: NORMAL FL
MONOCYTES # BLD: NORMAL 10*3/UL
MONOCYTES NFR BLD: NORMAL %
MPV (OFFPRE2): NORMAL
NEUTROPHILS # BLD: NORMAL 10*3/UL
NEUTROPHILS NFR BLD: NORMAL %
NRBC BLD MANUAL-RTO: NORMAL %
PLAT MORPH BLD: NORMAL
PLATELET # BLD: 119 10*3/UL
POTASSIUM SERPL-SCNC: 3.2 MMOL/L
RBC # BLD: 3.27 10*6/UL
RBC MORPH BLD: NORMAL
SODIUM SERPL-SCNC: 142 MMOL/L
WBC # BLD: 9.6 10*3/UL
WBC MORPH BLD: NORMAL

## 2019-12-04 PROCEDURE — 99238 HOSP IP/OBS DSCHRG MGMT 30/<: CPT | Performed by: INTERNAL MEDICINE

## 2019-12-04 PROCEDURE — 93306 TTE W/DOPPLER COMPLETE: CPT | Performed by: INTERNAL MEDICINE

## 2019-12-04 PROCEDURE — 82962 GLUCOSE BLOOD TEST: CPT

## 2019-12-04 PROCEDURE — 84132 ASSAY OF SERUM POTASSIUM: CPT | Performed by: INTERNAL MEDICINE

## 2019-12-04 PROCEDURE — 85025 COMPLETE CBC W/AUTO DIFF WBC: CPT | Performed by: INTERNAL MEDICINE

## 2019-12-04 PROCEDURE — 85610 PROTHROMBIN TIME: CPT | Performed by: NURSE PRACTITIONER

## 2019-12-04 PROCEDURE — 80048 BASIC METABOLIC PNL TOTAL CA: CPT | Performed by: INTERNAL MEDICINE

## 2019-12-04 RX ORDER — LISINOPRIL 40 MG/1
40 TABLET ORAL DAILY
Qty: 30 TABLET | Refills: 2 | Status: SHIPPED | OUTPATIENT
Start: 2019-12-05

## 2019-12-04 RX ORDER — ASPIRIN 81 MG/1
81 TABLET, CHEWABLE ORAL DAILY
Qty: 30 TABLET | Refills: 1 | Status: SHIPPED | OUTPATIENT
Start: 2019-12-04

## 2019-12-04 RX ORDER — POTASSIUM CHLORIDE 20 MEQ/1
40 TABLET, EXTENDED RELEASE ORAL EVERY 4 HOURS
Status: COMPLETED | OUTPATIENT
Start: 2019-12-04 | End: 2019-12-04

## 2019-12-04 RX ADMIN — HYDRALAZINE HYDROCHLORIDE 10 MG: 20 INJECTION INTRAMUSCULAR; INTRAVENOUS at 16:12:00

## 2019-12-04 RX ADMIN — DIPHENHYDRAMINE HCL 50 MG: 50 MG CAPSULE ORAL at 00:25:00

## 2019-12-04 RX ADMIN — FINASTERIDE 5 MG: 5 TABLET, FILM COATED ORAL at 08:03:00

## 2019-12-04 RX ADMIN — HYDRALAZINE HYDROCHLORIDE 10 MG: 20 INJECTION INTRAMUSCULAR; INTRAVENOUS at 08:28:00

## 2019-12-04 RX ADMIN — METOPROLOL SUCCINATE 50 MG: 50 TABLET, EXTENDED RELEASE ORAL at 05:13:00

## 2019-12-04 RX ADMIN — POTASSIUM CHLORIDE 40 MEQ: 20 TABLET, EXTENDED RELEASE ORAL at 12:30:00

## 2019-12-04 RX ADMIN — POTASSIUM CHLORIDE 40 MEQ: 20 TABLET, EXTENDED RELEASE ORAL at 04:53:00

## 2019-12-04 RX ADMIN — HYDRALAZINE HYDROCHLORIDE 10 MG: 20 INJECTION INTRAMUSCULAR; INTRAVENOUS at 13:12:00

## 2019-12-04 RX ADMIN — HYDRALAZINE HYDROCHLORIDE 10 MG: 20 INJECTION INTRAMUSCULAR; INTRAVENOUS at 04:53:00

## 2019-12-04 RX ADMIN — HYDROCHLOROTHIAZIDE 12.5 MG: 12.5 CAPSULE, GELATIN COATED ORAL at 08:03:00

## 2019-12-04 RX ADMIN — LISINOPRIL 40 MG: 40 TABLET ORAL at 09:20:00

## 2019-12-04 RX ADMIN — HYDROCODONE BITARTRATE AND ACETAMINOPHEN 1 TABLET: 10; 325 TABLET ORAL at 16:08:00

## 2019-12-04 RX ADMIN — FAMOTIDINE 20 MG: 20 TABLET ORAL at 08:03:00

## 2019-12-04 RX ADMIN — HYDROCODONE BITARTRATE AND ACETAMINOPHEN 2 TABLET: 10; 325 TABLET ORAL at 08:07:00

## 2019-12-04 RX ADMIN — POTASSIUM CHLORIDE 40 MEQ: 20 TABLET, EXTENDED RELEASE ORAL at 08:03:00

## 2019-12-04 NOTE — PLAN OF CARE
Assumed care of pt from PACU at 1200. Pt A&Ox4, lying flat, able to start sitting up. R groin site soft, no hematoma, no bleeding or oozing. Pt out of bed and up to bathroom after bedrest. Pt straight cathing self as he does at home. Pt denies any pain.  Pt

## 2019-12-04 NOTE — PROGRESS NOTES
Pt. Received as transfer from CCU to 2611. VSS, BP remains on higher side in 160's. Pt. Saturating well on RA. LAC and LFA IVs flushing well, saline locked. Pt. Is up ad jaleel, asking about discharge, and awaiting Dr. Marden Mortimer to round. Pt.  And wife updated on

## 2019-12-04 NOTE — PLAN OF CARE
Received pt at 1930. Pt alert and oriented x4, LE sensation equal MARK. Pt on RA while awake and CPAP while asleep, lungs clear/diminished, strong productive cough with clear and red tinted sputum.  Pt is A-V paced with -180s, PRN BP medication used t

## 2019-12-04 NOTE — PLAN OF CARE
Pt care assumed this am, piyush discontinued, sbp>170 by cough. Pt reported chronic neck and leg pain accompanied by \"light headed/ dizziness\" and headache. Prn hydralazine administered  And update to [facility]. APN with cards, new orders received.  Lisinopril wa

## 2019-12-04 NOTE — PROGRESS NOTES
[facility]  Cardiology Progress Note    Subjective:  No chest pain or shortness of breath.  On cpap    Objective:  /57   Pulse 70   Temp 98 °F (36.7 °C) (Temporal)   Resp 17   Wt 265 lb 6.9 oz (120.4 kg)   SpO2 96%   BMI 36.00 kg/m²     Telemetry no     significant stenosis. No regurgitation. Peak velocity (S): 2.50m/sec. Mean gradient (S): 9mm Hg. Valve area (VTI): 2.46cm^2. Indexed valve area     (VTI): 1.03cm^2/m^2.  3. Mitral valve: Mildly calcified annulus.  Transvalvular velocity was withi

## 2019-12-04 NOTE — PLAN OF CARE
SBP> 160 re medicated with prn hydralazine. Gauze/Tegaderm removed from rt groin soft with minimal bruising, small ooz, placed 2x2 in groin. Pt transferred to room 2611 via wheelchair accompanied by wife with all belongings in hand.

## 2019-12-04 NOTE — RESPIRATORY THERAPY NOTE
GAB - Equipment Use Daily Summary:  · Set Mode   · Usage in hours: 1:42  · 90% Pressure (EPAP) level:   · 90% Insp Pressure (IPAP): 8  · AHI: 0  · Supplemental Oxygen:  · Comments:

## 2019-12-04 NOTE — PROGRESS NOTES
Pt received clearance for discharge. Dr. Gi Lomas at the bedside discussing discharge and follow up. Notified Dr. Gi Lomas of SBP>160. Pt stated that \"it is always this high\". Pt. Received lisinopril on discharge medication reconciliation.  Pt received educat

## 2019-12-06 PROBLEM — Z95.818 PRESENCE OF WATCHMAN LEFT ATRIAL APPENDAGE CLOSURE DEVICE: Status: ACTIVE | Noted: 2019-12-06

## 2019-12-06 RX ORDER — FINASTERIDE 5 MG/1
5 TABLET, FILM COATED ORAL DAILY
COMMUNITY
Start: 2019-11-06

## 2019-12-07 NOTE — PROCEDURES
[facility]    PATIENT'S NAME: Kiko Karie   ATTENDING PHYSICIAN: Noah Vaughn M.D. OPERATING PHYSICIAN: Carmel Barros M.D.    PATIENT ACCOUNT#:   [de-identified]    LOCATION:  [address] [place] [address]  MEDICAL RECORD #:   [id number]       DATE OF BIRTH: entering the coronary sinus and the other lead tip terminating in the right ventricular apex. The aortic valve appears trileaflet and opens adequately in systole. The mitral valve leaflets are nonspecifically thickened, but they have normal motion.   Irena Miramontes pericardial effusion. The transseptal sheath was exchanged over a Safari wire for a Watchman delivery catheter. Through this, a pigtail catheter was advanced with TETE and fluoroscopic imaging guidance.   The appropriate lobe of the appendage was selecte

## 2019-12-09 ENCOUNTER — TELEPHONE (OUTPATIENT)
Dept: CARDIOLOGY | Age: 69
End: 2019-12-09

## 2019-12-09 ENCOUNTER — OFFICE VISIT (OUTPATIENT)
Dept: CARDIOLOGY | Age: 69
End: 2019-12-09

## 2019-12-09 VITALS
HEIGHT: 72 IN | DIASTOLIC BLOOD PRESSURE: 64 MMHG | BODY MASS INDEX: 36.44 KG/M2 | WEIGHT: 269 LBS | OXYGEN SATURATION: 96 % | HEART RATE: 71 BPM | SYSTOLIC BLOOD PRESSURE: 156 MMHG

## 2019-12-09 DIAGNOSIS — I48.20 CHRONIC ATRIAL FIBRILLATION (CMD): ICD-10-CM

## 2019-12-09 DIAGNOSIS — Z95.818 PRESENCE OF WATCHMAN LEFT ATRIAL APPENDAGE CLOSURE DEVICE: Primary | ICD-10-CM

## 2019-12-09 DIAGNOSIS — I10 ESSENTIAL HYPERTENSION: ICD-10-CM

## 2019-12-09 DIAGNOSIS — I42.0 DILATED CARDIOMYOPATHY (CMD): ICD-10-CM

## 2019-12-09 PROCEDURE — 99214 OFFICE O/P EST MOD 30 MIN: CPT | Performed by: NURSE PRACTITIONER

## 2019-12-09 RX ORDER — CARVEDILOL 25 MG/1
25 TABLET ORAL 2 TIMES DAILY WITH MEALS
Qty: 60 TABLET | Refills: 3 | Status: SHIPPED | OUTPATIENT
Start: 2019-12-09 | End: 2020-04-09

## 2019-12-17 ENCOUNTER — ANCILLARY ORDERS (OUTPATIENT)
Dept: CARDIOLOGY | Age: 69
End: 2019-12-17

## 2019-12-17 ENCOUNTER — ANCILLARY PROCEDURE (OUTPATIENT)
Dept: CARDIOLOGY | Age: 69
End: 2019-12-17
Attending: INTERNAL MEDICINE

## 2019-12-17 DIAGNOSIS — Z95.0 CARDIAC PACEMAKER: ICD-10-CM

## 2019-12-17 PROCEDURE — X1114 CARDIAC DEVICE HOME CHECK - REMOTE UNSCHEDULED: HCPCS | Performed by: INTERNAL MEDICINE

## 2019-12-17 PROCEDURE — 93294 REM INTERROG EVL PM/LDLS PM: CPT | Performed by: INTERNAL MEDICINE

## 2019-12-18 ENCOUNTER — OFFICE VISIT (OUTPATIENT)
Dept: CARDIOLOGY | Age: 69
End: 2019-12-18

## 2019-12-18 VITALS
HEART RATE: 76 BPM | OXYGEN SATURATION: 97 % | HEIGHT: 72 IN | SYSTOLIC BLOOD PRESSURE: 142 MMHG | WEIGHT: 274 LBS | BODY MASS INDEX: 37.11 KG/M2 | DIASTOLIC BLOOD PRESSURE: 78 MMHG

## 2019-12-18 DIAGNOSIS — I48.20 CHRONIC ATRIAL FIBRILLATION (CMD): Primary | ICD-10-CM

## 2019-12-18 DIAGNOSIS — I44.7 LBBB (LEFT BUNDLE BRANCH BLOCK): ICD-10-CM

## 2019-12-18 DIAGNOSIS — I50.22 CHRONIC SYSTOLIC HEART FAILURE (CMD): ICD-10-CM

## 2019-12-18 DIAGNOSIS — Z95.818 PRESENCE OF WATCHMAN LEFT ATRIAL APPENDAGE CLOSURE DEVICE: ICD-10-CM

## 2019-12-18 DIAGNOSIS — I42.0 DILATED CARDIOMYOPATHY (CMD): ICD-10-CM

## 2019-12-18 DIAGNOSIS — Z95.0 PACEMAKER: ICD-10-CM

## 2019-12-18 PROCEDURE — 99214 OFFICE O/P EST MOD 30 MIN: CPT | Performed by: INTERNAL MEDICINE

## 2020-01-01 ENCOUNTER — EXTERNAL RECORD (OUTPATIENT)
Dept: OTHER | Age: 70
End: 2020-01-01

## 2020-01-20 ENCOUNTER — TELEPHONE (OUTPATIENT)
Dept: CARDIOLOGY | Age: 70
End: 2020-01-20

## 2020-01-20 DIAGNOSIS — I10 HYPERTENSION, UNSPECIFIED TYPE: Primary | ICD-10-CM

## 2020-01-22 ENCOUNTER — OFFICE VISIT (OUTPATIENT)
Dept: CARDIOLOGY | Age: 70
End: 2020-01-22

## 2020-01-22 ENCOUNTER — HOSPITAL ENCOUNTER (OUTPATIENT)
Dept: INTERVENTIONAL RADIOLOGY/VASCULAR | Facility: HOSPITAL | Age: 70
Discharge: HOME | End: 2020-01-22
Attending: INTERNAL MEDICINE | Admitting: INTERNAL MEDICINE
Payer: MEDICARE

## 2020-01-22 ENCOUNTER — LAB ENCOUNTER (OUTPATIENT)
Dept: LAB | Facility: HOSPITAL | Age: 70
End: 2020-01-22
Attending: INTERNAL MEDICINE
Payer: MEDICARE

## 2020-01-22 ENCOUNTER — HOSPITAL ENCOUNTER (OUTPATIENT)
Dept: CV DIAGNOSTICS | Facility: HOSPITAL | Age: 70
Discharge: HOME | End: 2020-01-22
Attending: INTERNAL MEDICINE
Payer: MEDICARE

## 2020-01-22 ENCOUNTER — TELEPHONE (OUTPATIENT)
Dept: PULMONOLOGY | Facility: CLINIC | Age: 70
End: 2020-01-22

## 2020-01-22 VITALS
OXYGEN SATURATION: 92 % | DIASTOLIC BLOOD PRESSURE: 68 MMHG | WEIGHT: 168 LBS | BODY MASS INDEX: 23 KG/M2 | HEART RATE: 70 BPM | RESPIRATION RATE: 19 BRPM | SYSTOLIC BLOOD PRESSURE: 155 MMHG

## 2020-01-22 VITALS
OXYGEN SATURATION: 97 % | RESPIRATION RATE: 18 BRPM | BODY MASS INDEX: 37.11 KG/M2 | HEIGHT: 72 IN | WEIGHT: 274 LBS | SYSTOLIC BLOOD PRESSURE: 164 MMHG | HEART RATE: 73 BPM | DIASTOLIC BLOOD PRESSURE: 84 MMHG

## 2020-01-22 DIAGNOSIS — I34.8 MYXOID TRANSFORMATION OF MITRAL VALVE: ICD-10-CM

## 2020-01-22 DIAGNOSIS — I48.91 ATRIAL FIBRILLATION (HCC): ICD-10-CM

## 2020-01-22 DIAGNOSIS — I50.9 HEART FAILURE, UNSPECIFIED HF CHRONICITY, UNSPECIFIED HEART FAILURE TYPE (CMD): ICD-10-CM

## 2020-01-22 DIAGNOSIS — I25.10 CAD, MULTIPLE VESSEL: Primary | ICD-10-CM

## 2020-01-22 DIAGNOSIS — I50.9 HEART FAILURE, UNSPECIFIED (HCC): ICD-10-CM

## 2020-01-22 DIAGNOSIS — I48.20 CHRONIC ATRIAL FIBRILLATION (CMD): ICD-10-CM

## 2020-01-22 DIAGNOSIS — I25.10 CORONARY ATHEROSCLEROSIS OF NATIVE CORONARY ARTERY: Primary | ICD-10-CM

## 2020-01-22 DIAGNOSIS — I34.0 NONRHEUMATIC MITRAL VALVE REGURGITATION: ICD-10-CM

## 2020-01-22 LAB
ANION GAP SERPL CALC-SCNC: 4 MMOL/L (ref 0–18)
ANION GAP SERPL CALC-SCNC: NORMAL MMOL/L
BUN BLD-MCNC: 24 MG/DL (ref 7–18)
BUN SERPL-MCNC: 24 MG/DL
BUN/CREAT SERPL: 16.8 (ref 10–20)
BUN/CREAT SERPL: NORMAL
CALCIUM BLD-MCNC: 8.6 MG/DL (ref 8.5–10.1)
CALCIUM SERPL-MCNC: 8.6 MG/DL
CHLORIDE SERPL-SCNC: 111 MMOL/L
CHLORIDE SERPL-SCNC: 111 MMOL/L (ref 98–112)
CO2 SERPL-SCNC: 28 MMOL/L (ref 21–32)
CO2 SERPL-SCNC: NORMAL MMOL/L
CREAT BLD-MCNC: 1.43 MG/DL (ref 0.7–1.3)
CREAT SERPL-MCNC: 1.43 MG/DL
GLUCOSE BLD-MCNC: 253 MG/DL (ref 70–99)
GLUCOSE SERPL-MCNC: 253 MG/DL
LENGTH OF FAST TIME PATIENT: NORMAL H
NT-PROBNP SERPL-MCNC: 873 PG/ML
NT-PROBNP SERPL-MCNC: 873 PG/ML (ref ?–125)
OSMOLALITY SERPL CALC.SUM OF ELEC: 309 MOSM/KG (ref 275–295)
PATIENT FASTING Y/N/NP: NO
POTASSIUM SERPL-SCNC: 4 MMOL/L
POTASSIUM SERPL-SCNC: 4 MMOL/L (ref 3.5–5.1)
SODIUM SERPL-SCNC: 143 MMOL/L
SODIUM SERPL-SCNC: 143 MMOL/L (ref 136–145)

## 2020-01-22 PROCEDURE — 93325 DOPPLER ECHO COLOR FLOW MAPG: CPT | Performed by: INTERNAL MEDICINE

## 2020-01-22 PROCEDURE — 93320 DOPPLER ECHO COMPLETE: CPT | Performed by: INTERNAL MEDICINE

## 2020-01-22 PROCEDURE — 36415 COLL VENOUS BLD VENIPUNCTURE: CPT

## 2020-01-22 PROCEDURE — 83880 ASSAY OF NATRIURETIC PEPTIDE: CPT

## 2020-01-22 PROCEDURE — 93312 ECHO TRANSESOPHAGEAL: CPT | Performed by: INTERNAL MEDICINE

## 2020-01-22 PROCEDURE — B245ZZ4 ULTRASONOGRAPHY OF LEFT HEART, TRANSESOPHAGEAL: ICD-10-PCS | Performed by: INTERNAL MEDICINE

## 2020-01-22 PROCEDURE — 80048 BASIC METABOLIC PNL TOTAL CA: CPT

## 2020-01-22 PROCEDURE — 99152 MOD SED SAME PHYS/QHP 5/>YRS: CPT

## 2020-01-22 PROCEDURE — 99214 OFFICE O/P EST MOD 30 MIN: CPT | Performed by: INTERNAL MEDICINE

## 2020-01-22 PROCEDURE — 93312 ECHO TRANSESOPHAGEAL: CPT

## 2020-01-22 RX ORDER — MIDAZOLAM HYDROCHLORIDE 1 MG/ML
INJECTION INTRAMUSCULAR; INTRAVENOUS
Status: DISCONTINUED
Start: 2020-01-22 | End: 2020-01-22

## 2020-01-22 RX ORDER — LIDOCAINE HYDROCHLORIDE 20 MG/ML
SOLUTION OROPHARYNGEAL
Status: DISCONTINUED
Start: 2020-01-22 | End: 2020-01-22

## 2020-01-22 RX ORDER — ESOMEPRAZOLE MAGNESIUM 40 MG/1
40 CAPSULE, DELAYED RELEASE ORAL
COMMUNITY

## 2020-01-22 RX ORDER — SODIUM CHLORIDE 9 MG/ML
INJECTION, SOLUTION INTRAVENOUS
Status: DISCONTINUED | OUTPATIENT
Start: 2020-01-22 | End: 2020-01-22

## 2020-01-22 RX ORDER — HYDROCODONE BITARTRATE AND ACETAMINOPHEN 10; 325 MG/1; MG/1
2 TABLET ORAL EVERY MORNING
COMMUNITY
End: 2020-02-28

## 2020-01-22 RX ORDER — MIDAZOLAM HYDROCHLORIDE 1 MG/ML
1 INJECTION INTRAMUSCULAR; INTRAVENOUS EVERY 5 MIN PRN
Status: DISCONTINUED | OUTPATIENT
Start: 2020-01-22 | End: 2020-01-22

## 2020-01-22 ASSESSMENT — PATIENT HEALTH QUESTIONNAIRE - PHQ9
1. LITTLE INTEREST OR PLEASURE IN DOING THINGS: NOT AT ALL
SUM OF ALL RESPONSES TO PHQ9 QUESTIONS 1 AND 2: 0
2. FEELING DOWN, DEPRESSED OR HOPELESS: NOT AT ALL
SUM OF ALL RESPONSES TO PHQ9 QUESTIONS 1 AND 2: 0

## 2020-01-23 ENCOUNTER — CLINICAL ABSTRACT (OUTPATIENT)
Dept: CARDIOLOGY | Age: 70
End: 2020-01-23

## 2020-01-23 RX ORDER — FUROSEMIDE 20 MG/1
20 TABLET ORAL DAILY
Qty: 90 TABLET | Refills: 1 | Status: SHIPPED | OUTPATIENT
Start: 2020-01-23 | End: 2020-03-23 | Stop reason: DRUGHIGH

## 2020-01-23 NOTE — TELEPHONE ENCOUNTER
Patient indicates he is awaiting phone call to be seen as soon as possible with Dr. Geovanna Anderson, please call, thanks.

## 2020-01-23 NOTE — TELEPHONE ENCOUNTER
Discussed Dr. Jahaira Henderson orders w/ pt. Scheduled pt 1/31 4:15 pm Lom. Appt info given. He voiced understanding.

## 2020-01-30 ENCOUNTER — LAB ENCOUNTER (OUTPATIENT)
Dept: LAB | Facility: HOSPITAL | Age: 70
End: 2020-01-30
Attending: INTERNAL MEDICINE
Payer: MEDICARE

## 2020-01-30 DIAGNOSIS — I10 HTN (HYPERTENSION): Primary | ICD-10-CM

## 2020-01-30 LAB
ANION GAP SERPL CALC-SCNC: 0 MMOL/L (ref 0–18)
ANION GAP SERPL CALC-SCNC: NORMAL MMOL/L
BUN BLD-MCNC: 19 MG/DL (ref 7–18)
BUN SERPL-MCNC: 19 MG/DL
BUN/CREAT SERPL: 14 (ref 10–20)
BUN/CREAT SERPL: NORMAL
CALCIUM BLD-MCNC: 8.6 MG/DL (ref 8.5–10.1)
CALCIUM SERPL-MCNC: 8.6 MG/DL
CHLORIDE SERPL-SCNC: 111 MMOL/L
CHLORIDE SERPL-SCNC: 111 MMOL/L (ref 98–112)
CO2 SERPL-SCNC: 33 MMOL/L (ref 21–32)
CO2 SERPL-SCNC: NORMAL MMOL/L
CREAT BLD-MCNC: 1.36 MG/DL (ref 0.7–1.3)
CREAT SERPL-MCNC: 1.36 MG/DL
GLUCOSE BLD-MCNC: 127 MG/DL (ref 70–99)
GLUCOSE SERPL-MCNC: 127 MG/DL
LENGTH OF FAST TIME PATIENT: NORMAL H
OSMOLALITY SERPL CALC.SUM OF ELEC: 302 MOSM/KG (ref 275–295)
PATIENT FASTING Y/N/NP: NO
POTASSIUM SERPL-SCNC: 4.1 MMOL/L
POTASSIUM SERPL-SCNC: 4.1 MMOL/L (ref 3.5–5.1)
SODIUM SERPL-SCNC: 144 MMOL/L
SODIUM SERPL-SCNC: 144 MMOL/L (ref 136–145)

## 2020-01-30 PROCEDURE — 80048 BASIC METABOLIC PNL TOTAL CA: CPT

## 2020-01-30 PROCEDURE — 36415 COLL VENOUS BLD VENIPUNCTURE: CPT

## 2020-01-31 ENCOUNTER — TELEPHONE (OUTPATIENT)
Dept: INTERVENTIONAL RADIOLOGY/VASCULAR | Facility: HOSPITAL | Age: 70
End: 2020-01-31

## 2020-01-31 ENCOUNTER — TELEPHONE (OUTPATIENT)
Dept: CARDIOLOGY | Age: 70
End: 2020-01-31

## 2020-01-31 ENCOUNTER — CLINICAL ABSTRACT (OUTPATIENT)
Dept: CARDIOLOGY | Age: 70
End: 2020-01-31

## 2020-01-31 ENCOUNTER — OFFICE VISIT (OUTPATIENT)
Dept: PULMONOLOGY | Facility: CLINIC | Age: 70
End: 2020-01-31
Payer: MEDICARE

## 2020-01-31 VITALS
WEIGHT: 270 LBS | RESPIRATION RATE: 18 BRPM | HEIGHT: 72 IN | HEART RATE: 71 BPM | OXYGEN SATURATION: 91 % | SYSTOLIC BLOOD PRESSURE: 172 MMHG | BODY MASS INDEX: 36.57 KG/M2 | DIASTOLIC BLOOD PRESSURE: 81 MMHG

## 2020-01-31 DIAGNOSIS — J44.9 CHRONIC OBSTRUCTIVE PULMONARY DISEASE, UNSPECIFIED COPD TYPE (HCC): Primary | ICD-10-CM

## 2020-01-31 DIAGNOSIS — R06.00 DYSPNEA, UNSPECIFIED TYPE: ICD-10-CM

## 2020-01-31 DIAGNOSIS — R91.8 LUNG NODULES: ICD-10-CM

## 2020-01-31 DIAGNOSIS — G47.33 OSA ON CPAP: ICD-10-CM

## 2020-01-31 DIAGNOSIS — Z99.89 OSA ON CPAP: ICD-10-CM

## 2020-01-31 PROCEDURE — 99215 OFFICE O/P EST HI 40 MIN: CPT | Performed by: INTERNAL MEDICINE

## 2020-01-31 PROCEDURE — 94761 N-INVAS EAR/PLS OXIMETRY MLT: CPT | Performed by: INTERNAL MEDICINE

## 2020-01-31 PROCEDURE — G0463 HOSPITAL OUTPT CLINIC VISIT: HCPCS | Performed by: INTERNAL MEDICINE

## 2020-01-31 NOTE — PROGRESS NOTES
HPI:    Patient ID: Addie Villatoro is a 78year old male.     HPI  Claimed dyspnea on exertion x 4-6 weeks more then baseline with no cp or cough and no wheezes and denied LE pain or edema   He had watchman procedure and now off coumadin last week  No s Gluconate 595 MG Oral Cap Take 2 capsules by mouth daily. • FOLIC ACID 1 MG Oral Tab TAKE TWO TABLETS BY MOUTH ONCE DAILY (Patient taking differently: Take 1 mg by mouth 2 (two) times daily.   ) 180 tablet 3   • Glucose Blood (BLOOD GLUCOSE TEST) In V heard.  Pulmonary/Chest: No stridor. No respiratory distress. He has no wheezes. He has no rales. He exhibits no tenderness. Abdominal: Bowel sounds are normal. Musculoskeletal:         General: No tenderness or edema.      Lymphadenopathy:     He has no

## 2020-01-31 NOTE — TELEPHONE ENCOUNTER
Pt had 45 day post TETE done at [facility] on 1/22/2020. At that time, Coumadin stopped and pt given Rx for Plavix 75mg. I spoke with Rona Al, patient's wife who verified that he is taking Plavix/ASA 81mg. Will f/u at 6 months to stop Plavix.

## 2020-02-01 ENCOUNTER — TELEPHONE (OUTPATIENT)
Dept: RHEUMATOLOGY | Facility: CLINIC | Age: 70
End: 2020-02-01

## 2020-02-01 NOTE — TELEPHONE ENCOUNTER
Linda Lopez from [employer] requesting a diagnosis for medication below.     HYDROcodone-acetaminophen  MG Oral Tab

## 2020-02-03 ENCOUNTER — TELEPHONE (OUTPATIENT)
Dept: INTERVENTIONAL RADIOLOGY/VASCULAR | Facility: HOSPITAL | Age: 70
End: 2020-02-03

## 2020-02-03 ENCOUNTER — TELEPHONE (OUTPATIENT)
Dept: PULMONOLOGY | Facility: CLINIC | Age: 70
End: 2020-02-03

## 2020-02-03 ENCOUNTER — TELEPHONE (OUTPATIENT)
Dept: CARDIOLOGY | Age: 70
End: 2020-02-03

## 2020-02-03 DIAGNOSIS — R06.00 DYSPNEA, UNSPECIFIED TYPE: ICD-10-CM

## 2020-02-03 DIAGNOSIS — J44.9 CHRONIC OBSTRUCTIVE PULMONARY DISEASE, UNSPECIFIED COPD TYPE (HCC): Primary | ICD-10-CM

## 2020-02-03 DIAGNOSIS — R06.02 SHORTNESS OF BREATH: Primary | ICD-10-CM

## 2020-02-03 RX ORDER — FINASTERIDE 5 MG/1
TABLET, FILM COATED ORAL
Qty: 90 TABLET | Refills: 3 | Status: SHIPPED | OUTPATIENT
Start: 2020-02-03

## 2020-02-03 NOTE — TELEPHONE ENCOUNTER
Pt LOV with Héctor BIRD Oh 11/8/19 pt pharmacy requesting refill on finasteride if you agree please review and sign med, I copied and pasted part of your note below. At baseline he does CIC 5x/day. He is on finasteride 5 mg PO daily.

## 2020-02-03 NOTE — TELEPHONE ENCOUNTER
Nalini/[facility] states order should have weather or not oxygen should be continuous or with Execration. Order generated with Continuous added to order. Order faxed to [facility] [phone].

## 2020-02-03 NOTE — TELEPHONE ENCOUNTER
Patient called. He had his post Watchman TETE done at [facility].  His Coumadin was stopped but Plavix was not started. I called in a Plavix Rx to [facility]. He will start that today.  I told him that I will call him beginning of June t

## 2020-02-03 NOTE — TELEPHONE ENCOUNTER
Pt states when he scheduled his US they told him order was wrong - medicare needs to be primary and [employer] secondary

## 2020-02-03 NOTE — TELEPHONE ENCOUNTER
Ginny Moreira from [facility] calling for mutual patient, asking for corrected oxygen saturation along with which is needed nasal or mask for order,and frequency for the oxygen, please call at:[phone],thanks.

## 2020-02-04 NOTE — TELEPHONE ENCOUNTER
I called the patient today   Patient scheduled to have labs and x-ray as ordered in my office last Friday on this coming Thursday and Friday  Patient feeling well and claims his  symptoms are chronic  Pt received Home O2 today   Patient does not want to be checked in ER  We will check on those labs and other test when they done next few days

## 2020-02-04 NOTE — TELEPHONE ENCOUNTER
I spoke with Familia Jamison in the PA department. She provided me with a link to covered diagnosis codes for  [phone]. One of the covered codes is \"Shortness of breath\" R06.02. Upon chart review this seems to be an appropriate code.  Pended order to Dr. Herman Means

## 2020-02-04 NOTE — TELEPHONE ENCOUNTER
Called Central scheduling. Diagnosis code for US venous doppler does not meet medical necessity for medicare.      [facility] care please advise on acceptable diagnosis codes for this test. Please route back to [facility] so we can request an alte

## 2020-02-04 NOTE — TELEPHONE ENCOUNTER
Good Afternoon    Thanks for your request, unfortunately, we do not have any information regarding acceptable diagnosis codes for this test, we suggest contact [facility] AND [facility] Physician coding and billing.     We do not handle medical coding at [facility]

## 2020-02-04 NOTE — TELEPHONE ENCOUNTER
Discussed with Dr. Herman Means. He signed the order with new dx code. Called and notified central scheduling so they can make the proper adjustments. Called and notified the patient. He had no further questions at this time.

## 2020-02-06 ENCOUNTER — HOSPITAL ENCOUNTER (OUTPATIENT)
Dept: ULTRASOUND IMAGING | Facility: HOSPITAL | Age: 70
Discharge: HOME | End: 2020-02-06
Attending: INTERNAL MEDICINE
Payer: MEDICARE

## 2020-02-06 DIAGNOSIS — R06.02 SHORTNESS OF BREATH: ICD-10-CM

## 2020-02-06 PROCEDURE — 93970 EXTREMITY STUDY: CPT | Performed by: INTERNAL MEDICINE

## 2020-02-07 ENCOUNTER — HOSPITAL ENCOUNTER (OUTPATIENT)
Dept: NUCLEAR MEDICINE | Facility: HOSPITAL | Age: 70
Discharge: HOME | End: 2020-02-07
Attending: INTERNAL MEDICINE
Payer: MEDICARE

## 2020-02-07 ENCOUNTER — HOSPITAL ENCOUNTER (OUTPATIENT)
Dept: GENERAL RADIOLOGY | Facility: HOSPITAL | Age: 70
Discharge: HOME | End: 2020-02-07
Attending: INTERNAL MEDICINE
Payer: MEDICARE

## 2020-02-07 DIAGNOSIS — R06.00 DYSPNEA, UNSPECIFIED TYPE: ICD-10-CM

## 2020-02-07 PROCEDURE — 78582 LUNG VENTILAT&PERFUS IMAGING: CPT | Performed by: INTERNAL MEDICINE

## 2020-02-07 PROCEDURE — 71046 X-RAY EXAM CHEST 2 VIEWS: CPT | Performed by: INTERNAL MEDICINE

## 2020-02-14 ENCOUNTER — TELEPHONE (OUTPATIENT)
Dept: INTERVENTIONAL RADIOLOGY/VASCULAR | Facility: HOSPITAL | Age: 70
End: 2020-02-14

## 2020-02-14 NOTE — TELEPHONE ENCOUNTER
Called Kimberly Navarro to discuss Mitraclip. He was quite winded talking on the phone. He is going to come into clinic to meet the physicians next Thursday, 2/20 at 1400. Directions given.   Pt v/u

## 2020-02-20 ENCOUNTER — HOSPITAL ENCOUNTER (OUTPATIENT)
Dept: CARDIOLOGY CLINIC | Facility: HOSPITAL | Age: 70
Discharge: HOME | End: 2020-02-20
Attending: INTERNAL MEDICINE
Payer: MEDICARE

## 2020-02-20 ENCOUNTER — APPOINTMENT (OUTPATIENT)
Dept: CARDIOLOGY | Age: 70
End: 2020-02-20
Attending: INTERNAL MEDICINE

## 2020-02-20 VITALS
OXYGEN SATURATION: 95 % | HEART RATE: 74 BPM | TEMPERATURE: 99 F | SYSTOLIC BLOOD PRESSURE: 154 MMHG | DIASTOLIC BLOOD PRESSURE: 77 MMHG

## 2020-02-20 PROCEDURE — 99215 OFFICE O/P EST HI 40 MIN: CPT | Performed by: INTERNAL MEDICINE

## 2020-02-20 RX ORDER — CLOPIDOGREL BISULFATE 75 MG/1
75 TABLET ORAL DAILY
Status: ON HOLD | COMMUNITY
End: 2021-04-09

## 2020-02-20 RX ORDER — CARVEDILOL 25 MG/1
25 TABLET ORAL 2 TIMES DAILY WITH MEALS
COMMUNITY

## 2020-02-20 RX ORDER — FUROSEMIDE 20 MG/1
20 TABLET ORAL DAILY
Status: ON HOLD | COMMUNITY
End: 2020-03-06

## 2020-02-20 NOTE — CONSULTS
Cardiothoracic Surgery  Mitraclip Consult     Referring: Dr Venkatesh Webb  PCP: Dr Gaby Crawford  Reason for consult: severe symptomatic mitral regurgitation  78year old with symptomatic mitral regurgitation with increased SOB, KESSLER, fatigue  S/p watchman   Most recent t 19/1.4  Work-up:  CXR: no effusions or masses reported     A/P:  78year old with symptomatic mitral regurgitation  STS Risk Score: MVr:  4.2 %                    MVR: 5.6 %  The chart, old records and the cardiac cath was reviewed  Labs were reviewed and

## 2020-02-20 NOTE — CONSULTS
[facility]  Cardiology Consultation - Structural Heart Clinic    Afshan Charles Patient Status:  Outpatient    7/10/1950 MRN [id number]   Location [facility] Attending Hilda Gay MD   [facility] Day # 0 Osteoarthritis    • Type II or unspecified type diabetes mellitus without mention of complication, not stated as uncontrolled      Past Surgical History:   Procedure Laterality Date   • APPENDECTOMY  91599   • CARDIAC PACEMAKER PLACEMENT     • CHOLECYSTECT , Rfl:   •  DULoxetine HCl 60 MG Oral Cap DR Particles, TAKE 1 CAPSULE BY MOUTH TWICE DAILY, Disp: 180 capsule, Rfl: 1  •  hydrochlorothiazide 12.5 MG Oral Cap, Take 12.5 mg by mouth 2 (two) times daily.   , Disp: , Rfl:   •  Potassium Gluconate 595 MG Oral , Rfl:    •  LANTUS SOLOSTAR 100 UNIT/ML Subcutaneous Solution Pen-injector, 50 Units every morning.  , Disp: , Rfl:   •  tamsulosin HCl (FLOMAX) 0.4 MG Oral Cap, 2 tablets nightly.  , Disp: , Rfl:     Review of Systems:  A comprehensive review of systems w Wall motion was normal; there were no regional wall motion abnormalities. 2. Descending aorta: Moderate amounts of atheroma, non-mobile present. 3. Mitral valve: Moderate to severe regurgitation.  Systolic flow reversal in 2/4 pulmonary veins Mitral regur

## 2020-02-20 NOTE — PROGRESS NOTES
Pre-Procedural Instructions for  Transcatheter Mitral Valve Repair (MitraClip)  I called and spoke to the patient/family and provided the following instructions: Your MitraClip Procedure is scheduled for:  Wed 3/4    • Nothing to eat or drink after midnigh

## 2020-02-20 NOTE — PROGRESS NOTES
KCCQ complete. 5 min walk test deferred, pt. Becomes SOB walking 5 steps. Date of 3/4 given for clip. Will need Antonina Samuels 45 and PAT's at [address] [place] [address] prior to clip. Dr. Cee Hodge office notified.

## 2020-02-21 ENCOUNTER — TELEPHONE (OUTPATIENT)
Dept: CARDIOLOGY | Age: 70
End: 2020-02-21

## 2020-02-24 ENCOUNTER — LAB ENCOUNTER (OUTPATIENT)
Dept: LAB | Facility: HOSPITAL | Age: 70
End: 2020-02-24
Attending: INTERNAL MEDICINE
Payer: MEDICARE

## 2020-02-24 ENCOUNTER — HOSPITAL ENCOUNTER (OUTPATIENT)
Dept: GENERAL RADIOLOGY | Facility: HOSPITAL | Age: 70
Discharge: HOME | End: 2020-02-24
Attending: INTERNAL MEDICINE
Payer: MEDICARE

## 2020-02-24 DIAGNOSIS — Z01.810 PRE-OPERATIVE CARDIOVASCULAR EXAMINATION: ICD-10-CM

## 2020-02-24 DIAGNOSIS — I25.10 CORONARY ARTERY DISEASE INVOLVING NATIVE CORONARY ARTERY OF NATIVE HEART WITHOUT ANGINA PECTORIS: ICD-10-CM

## 2020-02-24 DIAGNOSIS — I48.91 ATRIAL FIBRILLATION, UNSPECIFIED TYPE (HCC): ICD-10-CM

## 2020-02-24 DIAGNOSIS — I48.91 ATRIAL FIBRILLATION (HCC): ICD-10-CM

## 2020-02-24 LAB
ANION GAP SERPL CALC-SCNC: 3 MMOL/L (ref 0–18)
BASOPHILS # BLD AUTO: 0.05 X10(3) UL (ref 0–0.2)
BASOPHILS NFR BLD AUTO: 0.6 %
BUN BLD-MCNC: 22 MG/DL (ref 7–18)
BUN/CREAT SERPL: 14.4 (ref 10–20)
CALCIUM BLD-MCNC: 8.8 MG/DL (ref 8.5–10.1)
CHLORIDE SERPL-SCNC: 109 MMOL/L (ref 98–112)
CO2 SERPL-SCNC: 30 MMOL/L (ref 21–32)
CREAT BLD-MCNC: 1.53 MG/DL (ref 0.7–1.3)
DEPRECATED RDW RBC AUTO: 54.4 FL (ref 35.1–46.3)
EOSINOPHIL # BLD AUTO: 0.3 X10(3) UL (ref 0–0.7)
EOSINOPHIL NFR BLD AUTO: 3.8 %
ERYTHROCYTE [DISTWIDTH] IN BLOOD BY AUTOMATED COUNT: 16.1 % (ref 11–15)
GLUCOSE BLD-MCNC: 187 MG/DL (ref 70–99)
HCT VFR BLD AUTO: 31.8 % (ref 39–53)
HGB BLD-MCNC: 9.7 G/DL (ref 13–17.5)
IMM GRANULOCYTES # BLD AUTO: 0.03 X10(3) UL (ref 0–1)
IMM GRANULOCYTES NFR BLD: 0.4 %
LYMPHOCYTES # BLD AUTO: 1.5 X10(3) UL (ref 1–4)
LYMPHOCYTES NFR BLD AUTO: 18.9 %
MCH RBC QN AUTO: 28.4 PG (ref 26–34)
MCHC RBC AUTO-ENTMCNC: 30.5 G/DL (ref 31–37)
MCV RBC AUTO: 93.3 FL (ref 80–100)
MONOCYTES # BLD AUTO: 0.55 X10(3) UL (ref 0.1–1)
MONOCYTES NFR BLD AUTO: 6.9 %
NEUTROPHILS # BLD AUTO: 5.49 X10 (3) UL (ref 1.5–7.7)
NEUTROPHILS # BLD AUTO: 5.49 X10(3) UL (ref 1.5–7.7)
NEUTROPHILS NFR BLD AUTO: 69.4 %
OSMOLALITY SERPL CALC.SUM OF ELEC: 302 MOSM/KG (ref 275–295)
PATIENT FASTING Y/N/NP: NO
PLATELET # BLD AUTO: 121 10(3)UL (ref 150–450)
POTASSIUM SERPL-SCNC: 3.8 MMOL/L (ref 3.5–5.1)
RBC # BLD AUTO: 3.41 X10(6)UL (ref 3.8–5.8)
SODIUM SERPL-SCNC: 142 MMOL/L (ref 136–145)
WBC # BLD AUTO: 7.9 X10(3) UL (ref 4–11)

## 2020-02-24 PROCEDURE — 93005 ELECTROCARDIOGRAM TRACING: CPT

## 2020-02-24 PROCEDURE — 85025 COMPLETE CBC W/AUTO DIFF WBC: CPT

## 2020-02-24 PROCEDURE — 71046 X-RAY EXAM CHEST 2 VIEWS: CPT | Performed by: INTERNAL MEDICINE

## 2020-02-24 PROCEDURE — 93010 ELECTROCARDIOGRAM REPORT: CPT | Performed by: INTERNAL MEDICINE

## 2020-02-24 PROCEDURE — 80048 BASIC METABOLIC PNL TOTAL CA: CPT

## 2020-02-24 PROCEDURE — 36415 COLL VENOUS BLD VENIPUNCTURE: CPT

## 2020-02-26 RX ORDER — SODIUM CHLORIDE 9 MG/ML
INJECTION, SOLUTION INTRAVENOUS
Status: CANCELLED | OUTPATIENT
Start: 2020-02-27 | End: 2020-02-27

## 2020-02-26 NOTE — PLAN OF CARE
2-26-20 11;30 Pt. Also instructed to HOLD his Metformin tonite and tomorrow morning. He had already taken it this morning.

## 2020-02-27 ENCOUNTER — HOSPITAL ENCOUNTER (OUTPATIENT)
Dept: INTERVENTIONAL RADIOLOGY/VASCULAR | Facility: HOSPITAL | Age: 70
Discharge: HOME | End: 2020-02-27
Attending: INTERNAL MEDICINE | Admitting: INTERNAL MEDICINE
Payer: MEDICARE

## 2020-02-27 VITALS
DIASTOLIC BLOOD PRESSURE: 76 MMHG | HEART RATE: 70 BPM | WEIGHT: 274 LBS | SYSTOLIC BLOOD PRESSURE: 161 MMHG | RESPIRATION RATE: 15 BRPM | HEIGHT: 72 IN | BODY MASS INDEX: 37.11 KG/M2 | OXYGEN SATURATION: 92 %

## 2020-02-27 DIAGNOSIS — I34.0 MITRAL VALVE REGURGITATION: ICD-10-CM

## 2020-02-27 DIAGNOSIS — I25.10 CAD (CORONARY ARTERY DISEASE): ICD-10-CM

## 2020-02-27 LAB — GLUCOSE BLDC GLUCOMTR-MCNC: 242 MG/DL (ref 70–99)

## 2020-02-27 PROCEDURE — B2151ZZ FLUOROSCOPY OF LEFT HEART USING LOW OSMOLAR CONTRAST: ICD-10-PCS | Performed by: INTERNAL MEDICINE

## 2020-02-27 PROCEDURE — 93453 R&L HRT CATH W/VENTRICLGRPHY: CPT

## 2020-02-27 PROCEDURE — 93460 R&L HRT ART/VENTRICLE ANGIO: CPT | Performed by: INTERNAL MEDICINE

## 2020-02-27 PROCEDURE — 82962 GLUCOSE BLOOD TEST: CPT

## 2020-02-27 PROCEDURE — B2111ZZ FLUOROSCOPY OF MULTIPLE CORONARY ARTERIES USING LOW OSMOLAR CONTRAST: ICD-10-PCS | Performed by: INTERNAL MEDICINE

## 2020-02-27 PROCEDURE — 99152 MOD SED SAME PHYS/QHP 5/>YRS: CPT

## 2020-02-27 PROCEDURE — 99153 MOD SED SAME PHYS/QHP EA: CPT

## 2020-02-27 PROCEDURE — 36415 COLL VENOUS BLD VENIPUNCTURE: CPT

## 2020-02-27 PROCEDURE — 4A023N8 MEASUREMENT OF CARDIAC SAMPLING AND PRESSURE, BILATERAL, PERCUTANEOUS APPROACH: ICD-10-PCS | Performed by: INTERNAL MEDICINE

## 2020-02-27 PROCEDURE — 99152 MOD SED SAME PHYS/QHP 5/>YRS: CPT | Performed by: INTERNAL MEDICINE

## 2020-02-27 RX ORDER — MIDAZOLAM HYDROCHLORIDE 1 MG/ML
INJECTION INTRAMUSCULAR; INTRAVENOUS
Status: COMPLETED
Start: 2020-02-27 | End: 2020-02-27

## 2020-02-27 RX ORDER — LIDOCAINE HYDROCHLORIDE 20 MG/ML
INJECTION, SOLUTION EPIDURAL; INFILTRATION; INTRACAUDAL; PERINEURAL
Status: COMPLETED
Start: 2020-02-27 | End: 2020-02-27

## 2020-02-27 RX ORDER — CHLORHEXIDINE GLUCONATE 4 G/100ML
30 SOLUTION TOPICAL
Status: DISCONTINUED | OUTPATIENT
Start: 2020-02-27 | End: 2020-02-27

## 2020-02-27 RX ORDER — FUROSEMIDE 10 MG/ML
INJECTION INTRAMUSCULAR; INTRAVENOUS
Status: COMPLETED
Start: 2020-02-27 | End: 2020-02-27

## 2020-02-27 RX ORDER — MIDAZOLAM HYDROCHLORIDE 1 MG/ML
INJECTION INTRAMUSCULAR; INTRAVENOUS
Status: DISCONTINUED
Start: 2020-02-27 | End: 2020-02-27 | Stop reason: WASHOUT

## 2020-02-27 RX ORDER — SODIUM CHLORIDE 9 MG/ML
1 INJECTION, SOLUTION INTRAVENOUS CONTINUOUS
Status: DISCONTINUED | OUTPATIENT
Start: 2020-02-27 | End: 2020-02-27

## 2020-02-27 RX ADMIN — SODIUM CHLORIDE 1 ML/KG/HR: 9 INJECTION, SOLUTION INTRAVENOUS at 10:15:00

## 2020-02-27 NOTE — PROCEDURES
[facility]    [facility] Cardiac Cath Procedure Note  Tarri Popremigio Patient Status:  Outpatient in a Bed    7/10/1950 MRN [id number]   Location [facility] Attending Mary Anne Agosto MD   Hosp Day # 0 PCP JAM via right femoral vein. 6 Bermudian pigtail placed in LV for LV angiogram in HAMMER projection and LV hemodynamics. Selective right femoral angiogram done assess anatomy for closure.     Specimen sent to: No specimen collected  Estimated blood loss: 10 cc  Cl

## 2020-02-27 NOTE — PROGRESS NOTES
History & Physical Examination    Patient Name: Lilliana Otto  MRN: [id number]  [facility]: [id number]  YOB: 1950    Diagnosis: Mitral regurgitation    Present Illness: Patient has a history of atrial fibrillation, off anticoagulants has a watch not normal, please explain:   HEENT X X    NECK & BACK X X    HEART X  mitral regurgitation murmur    LUNGS  shortness of breath X    ABDOMEN X X    UROGENITAL X [ ] NOT PERFORMED   EXTREMITIES  1+ edema X    OTHER        [ x ] I have discussed the risks a

## 2020-02-28 ENCOUNTER — OFFICE VISIT (OUTPATIENT)
Dept: RHEUMATOLOGY | Facility: CLINIC | Age: 70
End: 2020-02-28
Payer: MEDICARE

## 2020-02-28 VITALS
HEART RATE: 70 BPM | SYSTOLIC BLOOD PRESSURE: 160 MMHG | BODY MASS INDEX: 37.11 KG/M2 | RESPIRATION RATE: 24 BRPM | OXYGEN SATURATION: 92 % | WEIGHT: 274 LBS | DIASTOLIC BLOOD PRESSURE: 73 MMHG | HEIGHT: 72 IN

## 2020-02-28 DIAGNOSIS — M35.3 PMR (POLYMYALGIA RHEUMATICA) (HCC): Primary | ICD-10-CM

## 2020-02-28 DIAGNOSIS — R70.0 ELEVATED SED RATE: ICD-10-CM

## 2020-02-28 DIAGNOSIS — Z51.81 THERAPEUTIC DRUG MONITORING: ICD-10-CM

## 2020-02-28 PROCEDURE — 99214 OFFICE O/P EST MOD 30 MIN: CPT | Performed by: INTERNAL MEDICINE

## 2020-02-28 PROCEDURE — G0463 HOSPITAL OUTPT CLINIC VISIT: HCPCS | Performed by: INTERNAL MEDICINE

## 2020-02-28 RX ORDER — HYDROCODONE BITARTRATE AND ACETAMINOPHEN 10; 325 MG/1; MG/1
TABLET ORAL
Qty: 150 TABLET | Refills: 0 | Status: SHIPPED | OUTPATIENT
Start: 2020-02-28 | End: 2020-04-01

## 2020-02-28 NOTE — PROGRESS NOTES
Neil Alejo is a 78year old male who presents for Patient presents with:  PMR  Medication Follow-Up  Breathing Problem  . HPI:     He is a pleasant 75 year who has elevated sed rate, PMR and fibromyalgia with osteoarthirtis chronic pain.  He is he He had an accident in his back yard - he had right knee meniscal tear and he hurt his neck. He couln't move his head. He went to see Dr. Nati Stokes and he couldn't walk on his knee. He was sent to ortho.  He had to go back up on his norco b/c he needed more He takes norco 2 tablets in am and 1 in afternoon and 2 tablets in evening. He has pain more in the evening. It's all over -   Still walking without a cane. He stopped victoza b/c he couldn't afford it. He's still in [facility].  Dr. Medina Montiel is managing • PRILOSEC OR Take by mouth. • furosemide 20 MG Oral Tab Take 20 mg by mouth daily. • Clopidogrel Bisulfate 75 MG Oral Tab Take 75 mg by mouth daily. • carvedilol 25 MG Oral Tab Take 25 mg by mouth 2 (two) times daily with meals.      • FINASTER • Cholecalciferol (VITAMIN D3) 2000 UNITS Oral Tab Take 2 tablets by mouth daily. • Cyanocobalamin (B-12) 1000 MCG Oral Cap Take 1 capsule by mouth 2 (two) times daily.  Take 1 tab two times a day      • Cranberry 450 MG Oral Tab Take 6 tablets by alexia Years since quittin.3      Smokeless tobacco: Never Used    Alcohol use: No    Drug use: No     Retired railroad, still at fire department   4 children -   1 daughter 1 nurse, 1 is , 1 beautician,   Son - [place]  - was normal; there were no regional wall motion abnormalities. 2. Descending aorta: Moderate amounts of atheroma, non-mobile     present. 3. Mitral valve: Moderate to severe regurgitation.  Systolic flow     reversal in 2/4 pulmonary veins Mitral regurg - off pred b/c of sugars -doesn't want to restart - .   - stop  methotrexate B/c of nauseas , stoppe leflunomide b/c of sore throat   - stopped imuarn b/c didn't help pain and was nauseous -    -  stop ssz b/c of -   - he should call if pain is worse - ref 2. Cont.  norco 10/325mg 2 tablets in am and pm and 1 tablet in afternoon  #150   3. Return to clinic in 3 months   4.  Follow up after your surgery -         Cecelia Mendes MD  2/28/2020   2:45 PM  - Reviewed [place]- information  through Epic

## 2020-02-28 NOTE — PATIENT INSTRUCTIONS
1. Cont. Duloxetine - 60mg ad ay twice a day   2. Cont.  norco 10/325mg 2 tablets in am and pm and 1 tablet in afternoon  #150   3. Return to clinic in 3 months   4.  Follow up after your surgery -

## 2020-03-02 DIAGNOSIS — I34.0 MITRAL REGURGITATION: Primary | ICD-10-CM

## 2020-03-02 NOTE — PROGRESS NOTES
Pt. Instructed to arrive at 0530 on 3/4, check in at [employer], no meds am of procedure. NPO after MN. Instructed to bring CPAP mask and tubing. Pt. Verbalized understanding. Call with any questions.

## 2020-03-03 ENCOUNTER — ANESTHESIA EVENT (OUTPATIENT)
Dept: INTERVENTIONAL RADIOLOGY/VASCULAR | Facility: HOSPITAL | Age: 70
DRG: 267 | End: 2020-03-03
Payer: MEDICARE

## 2020-03-04 ENCOUNTER — HOSPITAL ENCOUNTER (INPATIENT)
Dept: INTERVENTIONAL RADIOLOGY/VASCULAR | Facility: HOSPITAL | Age: 70
LOS: 2 days | Discharge: HOME | DRG: 267 | End: 2020-03-06
Attending: INTERNAL MEDICINE | Admitting: INTERNAL MEDICINE
Payer: MEDICARE

## 2020-03-04 ENCOUNTER — APPOINTMENT (OUTPATIENT)
Dept: GENERAL RADIOLOGY | Facility: HOSPITAL | Age: 70
DRG: 267 | End: 2020-03-04
Attending: NURSE PRACTITIONER
Payer: MEDICARE

## 2020-03-04 ENCOUNTER — APPOINTMENT (OUTPATIENT)
Dept: CV DIAGNOSTICS | Facility: HOSPITAL | Age: 70
DRG: 267 | End: 2020-03-04
Attending: NURSE PRACTITIONER
Payer: MEDICARE

## 2020-03-04 ENCOUNTER — ANESTHESIA (OUTPATIENT)
Dept: INTERVENTIONAL RADIOLOGY/VASCULAR | Facility: HOSPITAL | Age: 70
DRG: 267 | End: 2020-03-04
Payer: MEDICARE

## 2020-03-04 DIAGNOSIS — I34.0 MITRAL REGURGITATION: ICD-10-CM

## 2020-03-04 DIAGNOSIS — I34.0 MITRAL VALVE INSUFFICIENCY, UNSPECIFIED ETIOLOGY: ICD-10-CM

## 2020-03-04 PROBLEM — Z98.890 S/P MITRAL VALVE CLIP IMPLANTATION: Status: ACTIVE | Noted: 2020-03-04

## 2020-03-04 PROBLEM — Z95.818 S/P MITRAL VALVE CLIP IMPLANTATION: Status: ACTIVE | Noted: 2020-03-04

## 2020-03-04 LAB
ANION GAP SERPL CALC-SCNC: 4 MMOL/L (ref 0–18)
ANTIBODY SCREEN: NEGATIVE
APTT PPP: 129.8 SECONDS (ref 25.4–36.1)
BASOPHILS # BLD AUTO: 0.04 X10(3) UL (ref 0–0.2)
BASOPHILS NFR BLD AUTO: 0.5 %
BUN BLD-MCNC: 21 MG/DL (ref 7–18)
BUN/CREAT SERPL: 15.1 (ref 10–20)
CALCIUM BLD-MCNC: 8.1 MG/DL (ref 8.5–10.1)
CHLORIDE SERPL-SCNC: 111 MMOL/L (ref 98–112)
CO2 SERPL-SCNC: 26 MMOL/L (ref 21–32)
CREAT BLD-MCNC: 1.39 MG/DL (ref 0.7–1.3)
DEPRECATED RDW RBC AUTO: 56.1 FL (ref 35.1–46.3)
DEPRECATED RDW RBC AUTO: 56.3 FL (ref 35.1–46.3)
EOSINOPHIL # BLD AUTO: 0.4 X10(3) UL (ref 0–0.7)
EOSINOPHIL NFR BLD AUTO: 4.8 %
ERYTHROCYTE [DISTWIDTH] IN BLOOD BY AUTOMATED COUNT: 16.5 % (ref 11–15)
ERYTHROCYTE [DISTWIDTH] IN BLOOD BY AUTOMATED COUNT: 16.7 % (ref 11–15)
EST. AVERAGE GLUCOSE BLD GHB EST-MCNC: 151 MG/DL (ref 68–126)
GLUCOSE BLD-MCNC: 193 MG/DL (ref 70–99)
GLUCOSE BLD-MCNC: 251 MG/DL (ref 70–99)
GLUCOSE BLD-MCNC: 252 MG/DL (ref 70–99)
GLUCOSE BLD-MCNC: 303 MG/DL (ref 70–99)
GLUCOSE BLD-MCNC: 341 MG/DL (ref 70–99)
GLUCOSE BLD-MCNC: 381 MG/DL (ref 70–99)
GLUCOSE BLD-MCNC: 397 MG/DL (ref 70–99)
HAV IGM SER QL: 2.2 MG/DL (ref 1.6–2.6)
HBA1C MFR BLD HPLC: 6.9 % (ref ?–5.7)
HCT VFR BLD AUTO: 27 % (ref 39–53)
HCT VFR BLD AUTO: 31.9 % (ref 39–53)
HGB BLD-MCNC: 8.3 G/DL (ref 13–17.5)
HGB BLD-MCNC: 9.6 G/DL (ref 13–17.5)
IMM GRANULOCYTES # BLD AUTO: 0.04 X10(3) UL (ref 0–1)
IMM GRANULOCYTES NFR BLD: 0.5 %
INR BLD: 1.16 (ref 0.9–1.1)
IRON SATURATION: 9 % (ref 20–50)
IRON SERPL-MCNC: 36 UG/DL (ref 65–175)
ISTAT ACTIVATED CLOTTING TIME: 202 SECONDS (ref 74–137)
ISTAT ACTIVATED CLOTTING TIME: 241 SECONDS (ref 74–137)
ISTAT ACTIVATED CLOTTING TIME: 252 SECONDS (ref 74–137)
LYMPHOCYTES # BLD AUTO: 1.52 X10(3) UL (ref 1–4)
LYMPHOCYTES NFR BLD AUTO: 18.3 %
MCH RBC QN AUTO: 28.2 PG (ref 26–34)
MCH RBC QN AUTO: 28.7 PG (ref 26–34)
MCHC RBC AUTO-ENTMCNC: 30.1 G/DL (ref 31–37)
MCHC RBC AUTO-ENTMCNC: 30.7 G/DL (ref 31–37)
MCV RBC AUTO: 93.4 FL (ref 80–100)
MCV RBC AUTO: 93.5 FL (ref 80–100)
MONOCYTES # BLD AUTO: 0.61 X10(3) UL (ref 0.1–1)
MONOCYTES NFR BLD AUTO: 7.3 %
NEUTROPHILS # BLD AUTO: 5.7 X10 (3) UL (ref 1.5–7.7)
NEUTROPHILS # BLD AUTO: 5.7 X10(3) UL (ref 1.5–7.7)
NEUTROPHILS NFR BLD AUTO: 68.6 %
OSMOLALITY SERPL CALC.SUM OF ELEC: 304 MOSM/KG (ref 275–295)
PLATELET # BLD AUTO: 112 10(3)UL (ref 150–450)
PLATELET # BLD AUTO: 118 10(3)UL (ref 150–450)
POTASSIUM SERPL-SCNC: 3.9 MMOL/L (ref 3.5–5.1)
PSA SERPL DL<=0.01 NG/ML-MCNC: 15.3 SECONDS (ref 12.5–14.7)
RBC # BLD AUTO: 2.89 X10(6)UL (ref 3.8–5.8)
RBC # BLD AUTO: 3.41 X10(6)UL (ref 3.8–5.8)
RH BLOOD TYPE: POSITIVE
SODIUM SERPL-SCNC: 141 MMOL/L (ref 136–145)
TOTAL IRON BINDING CAPACITY: 411 UG/DL (ref 240–450)
TRANSFERRIN SERPL-MCNC: 276 MG/DL (ref 200–360)
WBC # BLD AUTO: 12.2 X10(3) UL (ref 4–11)
WBC # BLD AUTO: 8.3 X10(3) UL (ref 4–11)

## 2020-03-04 PROCEDURE — 83540 ASSAY OF IRON: CPT | Performed by: INTERNAL MEDICINE

## 2020-03-04 PROCEDURE — 93010 ELECTROCARDIOGRAM REPORT: CPT | Performed by: INTERNAL MEDICINE

## 2020-03-04 PROCEDURE — 80048 BASIC METABOLIC PNL TOTAL CA: CPT | Performed by: NURSE PRACTITIONER

## 2020-03-04 PROCEDURE — 86920 COMPATIBILITY TEST SPIN: CPT

## 2020-03-04 PROCEDURE — 33418 REPAIR TCAT MITRAL VALVE: CPT | Performed by: INTERNAL MEDICINE

## 2020-03-04 PROCEDURE — 33419 REPAIR TCAT MITRAL VALVE: CPT | Performed by: INTERNAL MEDICINE

## 2020-03-04 PROCEDURE — 93355 ECHO TRANSESOPHAGEAL (TEE): CPT | Performed by: INTERNAL MEDICINE

## 2020-03-04 PROCEDURE — 85027 COMPLETE CBC AUTOMATED: CPT | Performed by: NURSE PRACTITIONER

## 2020-03-04 PROCEDURE — 33418 REPAIR TCAT MITRAL VALVE: CPT

## 2020-03-04 PROCEDURE — 86900 BLOOD TYPING SEROLOGIC ABO: CPT | Performed by: INTERNAL MEDICINE

## 2020-03-04 PROCEDURE — 82962 GLUCOSE BLOOD TEST: CPT

## 2020-03-04 PROCEDURE — B245ZZ4 ULTRASONOGRAPHY OF LEFT HEART, TRANSESOPHAGEAL: ICD-10-PCS | Performed by: INTERNAL MEDICINE

## 2020-03-04 PROCEDURE — 85347 COAGULATION TIME ACTIVATED: CPT

## 2020-03-04 PROCEDURE — 71045 X-RAY EXAM CHEST 1 VIEW: CPT | Performed by: NURSE PRACTITIONER

## 2020-03-04 PROCEDURE — 93355 ECHO TRANSESOPHAGEAL (TEE): CPT | Performed by: NURSE PRACTITIONER

## 2020-03-04 PROCEDURE — 87081 CULTURE SCREEN ONLY: CPT | Performed by: INTERNAL MEDICINE

## 2020-03-04 PROCEDURE — 86850 RBC ANTIBODY SCREEN: CPT | Performed by: INTERNAL MEDICINE

## 2020-03-04 PROCEDURE — 83036 HEMOGLOBIN GLYCOSYLATED A1C: CPT | Performed by: INTERNAL MEDICINE

## 2020-03-04 PROCEDURE — 85025 COMPLETE CBC W/AUTO DIFF WBC: CPT | Performed by: INTERNAL MEDICINE

## 2020-03-04 PROCEDURE — 86901 BLOOD TYPING SEROLOGIC RH(D): CPT | Performed by: INTERNAL MEDICINE

## 2020-03-04 PROCEDURE — 93005 ELECTROCARDIOGRAM TRACING: CPT

## 2020-03-04 PROCEDURE — B51VYZZ FLUOROSCOPY OF OTHER VEINS USING OTHER CONTRAST: ICD-10-PCS | Performed by: INTERNAL MEDICINE

## 2020-03-04 PROCEDURE — 85730 THROMBOPLASTIN TIME PARTIAL: CPT | Performed by: NURSE PRACTITIONER

## 2020-03-04 PROCEDURE — 5A09357 ASSISTANCE WITH RESPIRATORY VENTILATION, LESS THAN 24 CONSECUTIVE HOURS, CONTINUOUS POSITIVE AIRWAY PRESSURE: ICD-10-PCS | Performed by: INTERNAL MEDICINE

## 2020-03-04 PROCEDURE — S0028 INJECTION, FAMOTIDINE, 20 MG: HCPCS | Performed by: NURSE PRACTITIONER

## 2020-03-04 PROCEDURE — 94660 CPAP INITIATION&MGMT: CPT

## 2020-03-04 PROCEDURE — 85610 PROTHROMBIN TIME: CPT | Performed by: NURSE PRACTITIONER

## 2020-03-04 PROCEDURE — 76937 US GUIDE VASCULAR ACCESS: CPT

## 2020-03-04 PROCEDURE — 83735 ASSAY OF MAGNESIUM: CPT | Performed by: NURSE PRACTITIONER

## 2020-03-04 PROCEDURE — 02UG3JZ SUPPLEMENT MITRAL VALVE WITH SYNTHETIC SUBSTITUTE, PERCUTANEOUS APPROACH: ICD-10-PCS | Performed by: INTERNAL MEDICINE

## 2020-03-04 PROCEDURE — 83550 IRON BINDING TEST: CPT | Performed by: INTERNAL MEDICINE

## 2020-03-04 RX ORDER — DULOXETIN HYDROCHLORIDE 30 MG/1
60 CAPSULE, DELAYED RELEASE ORAL 2 TIMES DAILY
Status: DISCONTINUED | OUTPATIENT
Start: 2020-03-04 | End: 2020-03-06

## 2020-03-04 RX ORDER — DOCUSATE SODIUM 100 MG/1
100 CAPSULE, LIQUID FILLED ORAL 2 TIMES DAILY
Status: DISCONTINUED | OUTPATIENT
Start: 2020-03-04 | End: 2020-03-06

## 2020-03-04 RX ORDER — HYDROCODONE BITARTRATE AND ACETAMINOPHEN 5; 325 MG/1; MG/1
2 TABLET ORAL EVERY 4 HOURS PRN
Status: DISCONTINUED | OUTPATIENT
Start: 2020-03-04 | End: 2020-03-06

## 2020-03-04 RX ORDER — MULTIPLE VITAMINS W/ MINERALS TAB 9MG-400MCG
1 TAB ORAL DAILY
Status: DISCONTINUED | OUTPATIENT
Start: 2020-03-05 | End: 2020-03-06

## 2020-03-04 RX ORDER — HYDROMORPHONE HYDROCHLORIDE 1 MG/ML
0.4 INJECTION, SOLUTION INTRAMUSCULAR; INTRAVENOUS; SUBCUTANEOUS EVERY 5 MIN PRN
Status: DISPENSED | OUTPATIENT
Start: 2020-03-04 | End: 2020-03-04

## 2020-03-04 RX ORDER — HEPARIN SODIUM 5000 [USP'U]/ML
INJECTION, SOLUTION INTRAVENOUS; SUBCUTANEOUS
Status: COMPLETED
Start: 2020-03-04 | End: 2020-03-04

## 2020-03-04 RX ORDER — AMOXICILLIN 500 MG
1 CAPSULE ORAL DAILY
Status: DISCONTINUED | OUTPATIENT
Start: 2020-03-05 | End: 2020-03-04

## 2020-03-04 RX ORDER — ACETAMINOPHEN AND CODEINE PHOSPHATE 300; 30 MG/1; MG/1
1 TABLET ORAL EVERY 4 HOURS PRN
Status: DISCONTINUED | OUTPATIENT
Start: 2020-03-04 | End: 2020-03-05

## 2020-03-04 RX ORDER — ASPIRIN 81 MG/1
81 TABLET, CHEWABLE ORAL DAILY
Status: DISCONTINUED | OUTPATIENT
Start: 2020-03-04 | End: 2020-03-06

## 2020-03-04 RX ORDER — FAMOTIDINE 10 MG/ML
20 INJECTION, SOLUTION INTRAVENOUS 2 TIMES DAILY
Status: DISCONTINUED | OUTPATIENT
Start: 2020-03-04 | End: 2020-03-04

## 2020-03-04 RX ORDER — BISACODYL 10 MG
10 SUPPOSITORY, RECTAL RECTAL
Status: DISCONTINUED | OUTPATIENT
Start: 2020-03-04 | End: 2020-03-06

## 2020-03-04 RX ORDER — AMPICILLIN TRIHYDRATE 250 MG
200 CAPSULE ORAL DAILY
Status: DISCONTINUED | OUTPATIENT
Start: 2020-03-05 | End: 2020-03-04

## 2020-03-04 RX ORDER — NALOXONE HYDROCHLORIDE 0.4 MG/ML
80 INJECTION, SOLUTION INTRAMUSCULAR; INTRAVENOUS; SUBCUTANEOUS AS NEEDED
Status: ACTIVE | OUTPATIENT
Start: 2020-03-04 | End: 2020-03-04

## 2020-03-04 RX ORDER — SODIUM CHLORIDE, SODIUM LACTATE, POTASSIUM CHLORIDE, CALCIUM CHLORIDE 600; 310; 30; 20 MG/100ML; MG/100ML; MG/100ML; MG/100ML
INJECTION, SOLUTION INTRAVENOUS CONTINUOUS
Status: DISCONTINUED | OUTPATIENT
Start: 2020-03-04 | End: 2020-03-04

## 2020-03-04 RX ORDER — CARVEDILOL 12.5 MG/1
25 TABLET ORAL 2 TIMES DAILY WITH MEALS
Status: DISCONTINUED | OUTPATIENT
Start: 2020-03-04 | End: 2020-03-06

## 2020-03-04 RX ORDER — FINASTERIDE 5 MG/1
5 TABLET, FILM COATED ORAL EVERY EVENING
Status: DISCONTINUED | OUTPATIENT
Start: 2020-03-04 | End: 2020-03-06

## 2020-03-04 RX ORDER — LIDOCAINE HYDROCHLORIDE 10 MG/ML
INJECTION, SOLUTION EPIDURAL; INFILTRATION; INTRACAUDAL; PERINEURAL
Status: COMPLETED
Start: 2020-03-04 | End: 2020-03-04

## 2020-03-04 RX ORDER — TAMSULOSIN HYDROCHLORIDE 0.4 MG/1
0.8 CAPSULE ORAL NIGHTLY
Status: DISCONTINUED | OUTPATIENT
Start: 2020-03-04 | End: 2020-03-06

## 2020-03-04 RX ORDER — ACETAMINOPHEN AND CODEINE PHOSPHATE 300; 30 MG/1; MG/1
2 TABLET ORAL EVERY 4 HOURS PRN
Status: DISCONTINUED | OUTPATIENT
Start: 2020-03-04 | End: 2020-03-05

## 2020-03-04 RX ORDER — ROCURONIUM BROMIDE 10 MG/ML
INJECTION, SOLUTION INTRAVENOUS AS NEEDED
Status: DISCONTINUED | OUTPATIENT
Start: 2020-03-04 | End: 2020-03-04 | Stop reason: SURG

## 2020-03-04 RX ORDER — LISINOPRIL 40 MG/1
40 TABLET ORAL DAILY
Status: DISCONTINUED | OUTPATIENT
Start: 2020-03-05 | End: 2020-03-04

## 2020-03-04 RX ORDER — DEXAMETHASONE SODIUM PHOSPHATE 4 MG/ML
VIAL (ML) INJECTION AS NEEDED
Status: DISCONTINUED | OUTPATIENT
Start: 2020-03-04 | End: 2020-03-04 | Stop reason: SURG

## 2020-03-04 RX ORDER — MELATONIN
1000 2 TIMES DAILY
Status: DISCONTINUED | OUTPATIENT
Start: 2020-03-06 | End: 2020-03-06

## 2020-03-04 RX ORDER — FAMOTIDINE 20 MG/1
20 TABLET ORAL 2 TIMES DAILY
Status: DISCONTINUED | OUTPATIENT
Start: 2020-03-04 | End: 2020-03-06

## 2020-03-04 RX ORDER — LISINOPRIL 40 MG/1
40 TABLET ORAL DAILY
Status: DISCONTINUED | OUTPATIENT
Start: 2020-03-04 | End: 2020-03-06

## 2020-03-04 RX ORDER — DIPHENHYDRAMINE HCL 50 MG
50 CAPSULE ORAL NIGHTLY PRN
Status: DISCONTINUED | OUTPATIENT
Start: 2020-03-04 | End: 2020-03-06

## 2020-03-04 RX ORDER — FUROSEMIDE 20 MG/1
20 TABLET ORAL DAILY
Status: DISCONTINUED | OUTPATIENT
Start: 2020-03-05 | End: 2020-03-05

## 2020-03-04 RX ORDER — ACETAMINOPHEN 325 MG/1
650 TABLET ORAL EVERY 4 HOURS PRN
Status: DISCONTINUED | OUTPATIENT
Start: 2020-03-04 | End: 2020-03-06

## 2020-03-04 RX ORDER — CLOPIDOGREL BISULFATE 75 MG/1
75 TABLET ORAL DAILY
Status: DISCONTINUED | OUTPATIENT
Start: 2020-03-04 | End: 2020-03-06

## 2020-03-04 RX ORDER — HYDROCHLOROTHIAZIDE 12.5 MG/1
12.5 CAPSULE, GELATIN COATED ORAL 2 TIMES DAILY
Status: DISCONTINUED | OUTPATIENT
Start: 2020-03-05 | End: 2020-03-06

## 2020-03-04 RX ORDER — CHOLECALCIFEROL (VITAMIN D3) 125 MCG
2 CAPSULE ORAL DAILY
Status: DISCONTINUED | OUTPATIENT
Start: 2020-03-05 | End: 2020-03-04

## 2020-03-04 RX ORDER — ONDANSETRON 2 MG/ML
INJECTION INTRAMUSCULAR; INTRAVENOUS AS NEEDED
Status: DISCONTINUED | OUTPATIENT
Start: 2020-03-04 | End: 2020-03-04 | Stop reason: SURG

## 2020-03-04 RX ORDER — DEXTROSE MONOHYDRATE 25 G/50ML
50 INJECTION, SOLUTION INTRAVENOUS
Status: DISCONTINUED | OUTPATIENT
Start: 2020-03-04 | End: 2020-03-06

## 2020-03-04 RX ORDER — HEPARIN SODIUM 1000 [USP'U]/ML
INJECTION, SOLUTION INTRAVENOUS; SUBCUTANEOUS AS NEEDED
Status: DISCONTINUED | OUTPATIENT
Start: 2020-03-04 | End: 2020-03-04 | Stop reason: SURG

## 2020-03-04 RX ORDER — METOCLOPRAMIDE HYDROCHLORIDE 5 MG/ML
10 INJECTION INTRAMUSCULAR; INTRAVENOUS EVERY 8 HOURS PRN
Status: DISCONTINUED | OUTPATIENT
Start: 2020-03-04 | End: 2020-03-06

## 2020-03-04 RX ORDER — PROTAMINE SULFATE 10 MG/ML
INJECTION, SOLUTION INTRAVENOUS
Status: COMPLETED
Start: 2020-03-04 | End: 2020-03-04

## 2020-03-04 RX ORDER — ONDANSETRON 2 MG/ML
4 INJECTION INTRAMUSCULAR; INTRAVENOUS AS NEEDED
Status: ACTIVE | OUTPATIENT
Start: 2020-03-04 | End: 2020-03-04

## 2020-03-04 RX ORDER — SODIUM CHLORIDE 9 MG/ML
INJECTION, SOLUTION INTRAVENOUS CONTINUOUS
Status: DISCONTINUED | OUTPATIENT
Start: 2020-03-04 | End: 2020-03-05

## 2020-03-04 RX ORDER — CEFAZOLIN SODIUM/WATER 2 G/20 ML
SYRINGE (ML) INTRAVENOUS
Status: COMPLETED
Start: 2020-03-04 | End: 2020-03-04

## 2020-03-04 RX ORDER — PROTAMINE SULFATE 10 MG/ML
INJECTION, SOLUTION INTRAVENOUS AS NEEDED
Status: DISCONTINUED | OUTPATIENT
Start: 2020-03-04 | End: 2020-03-04 | Stop reason: SURG

## 2020-03-04 RX ORDER — POLYETHYLENE GLYCOL 3350 17 G/17G
17 POWDER, FOR SOLUTION ORAL DAILY PRN
Status: DISCONTINUED | OUTPATIENT
Start: 2020-03-04 | End: 2020-03-06

## 2020-03-04 RX ORDER — HYDRALAZINE HYDROCHLORIDE 20 MG/ML
10 INJECTION INTRAMUSCULAR; INTRAVENOUS EVERY 6 HOURS PRN
Status: DISCONTINUED | OUTPATIENT
Start: 2020-03-04 | End: 2020-03-06

## 2020-03-04 RX ORDER — FOLIC ACID 1 MG/1
1 TABLET ORAL 2 TIMES DAILY
Status: DISCONTINUED | OUTPATIENT
Start: 2020-03-05 | End: 2020-03-06

## 2020-03-04 RX ORDER — ALBUTEROL SULFATE 2.5 MG/3ML
2.5 SOLUTION RESPIRATORY (INHALATION) AS NEEDED
Status: ACTIVE | OUTPATIENT
Start: 2020-03-04 | End: 2020-03-04

## 2020-03-04 RX ORDER — LIDOCAINE HYDROCHLORIDE 10 MG/ML
INJECTION, SOLUTION EPIDURAL; INFILTRATION; INTRACAUDAL; PERINEURAL AS NEEDED
Status: DISCONTINUED | OUTPATIENT
Start: 2020-03-04 | End: 2020-03-04 | Stop reason: SURG

## 2020-03-04 RX ORDER — ONDANSETRON 2 MG/ML
4 INJECTION INTRAMUSCULAR; INTRAVENOUS EVERY 6 HOURS PRN
Status: DISCONTINUED | OUTPATIENT
Start: 2020-03-04 | End: 2020-03-06

## 2020-03-04 RX ORDER — FUROSEMIDE 10 MG/ML
20 INJECTION INTRAMUSCULAR; INTRAVENOUS ONCE
Status: COMPLETED | OUTPATIENT
Start: 2020-03-04 | End: 2020-03-04

## 2020-03-04 RX ORDER — SODIUM PHOSPHATE, DIBASIC AND SODIUM PHOSPHATE, MONOBASIC 7; 19 G/133ML; G/133ML
1 ENEMA RECTAL ONCE AS NEEDED
Status: DISCONTINUED | OUTPATIENT
Start: 2020-03-04 | End: 2020-03-05

## 2020-03-04 RX ADMIN — DOCUSATE SODIUM 100 MG: 100 CAPSULE, LIQUID FILLED ORAL at 20:23:00

## 2020-03-04 RX ADMIN — HEPARIN SODIUM 5000 UNITS: 1000 INJECTION, SOLUTION INTRAVENOUS; SUBCUTANEOUS at 08:24:00

## 2020-03-04 RX ADMIN — DEXAMETHASONE SODIUM PHOSPHATE 4 MG: 4 MG/ML VIAL (ML) INJECTION at 07:16:00

## 2020-03-04 RX ADMIN — CLOPIDOGREL BISULFATE 75 MG: 75 TABLET ORAL at 17:45:00

## 2020-03-04 RX ADMIN — SODIUM CHLORIDE: 9 INJECTION, SOLUTION INTRAVENOUS at 06:58:00

## 2020-03-04 RX ADMIN — SODIUM CHLORIDE: 9 INJECTION, SOLUTION INTRAVENOUS at 05:45:00

## 2020-03-04 RX ADMIN — HEPARIN SODIUM 2000 UNITS: 1000 INJECTION, SOLUTION INTRAVENOUS; SUBCUTANEOUS at 08:48:00

## 2020-03-04 RX ADMIN — HYDRALAZINE HYDROCHLORIDE 10 MG: 20 INJECTION INTRAMUSCULAR; INTRAVENOUS at 20:19:00

## 2020-03-04 RX ADMIN — DIPHENHYDRAMINE HCL 50 MG: 50 MG CAPSULE ORAL at 21:56:00

## 2020-03-04 RX ADMIN — HYDRALAZINE HYDROCHLORIDE 10 MG: 20 INJECTION INTRAMUSCULAR; INTRAVENOUS at 10:38:00

## 2020-03-04 RX ADMIN — LIDOCAINE HYDROCHLORIDE 50 MG: 10 INJECTION, SOLUTION EPIDURAL; INFILTRATION; INTRACAUDAL; PERINEURAL at 07:07:00

## 2020-03-04 RX ADMIN — ROCURONIUM BROMIDE 10 MG: 10 INJECTION, SOLUTION INTRAVENOUS at 09:12:00

## 2020-03-04 RX ADMIN — FAMOTIDINE 20 MG: 20 TABLET ORAL at 20:23:00

## 2020-03-04 RX ADMIN — ONDANSETRON 4 MG: 2 INJECTION INTRAMUSCULAR; INTRAVENOUS at 09:44:00

## 2020-03-04 RX ADMIN — TAMSULOSIN HYDROCHLORIDE 0.8 MG: 0.4 CAPSULE ORAL at 21:56:00

## 2020-03-04 RX ADMIN — FINASTERIDE 5 MG: 5 TABLET, FILM COATED ORAL at 17:44:00

## 2020-03-04 RX ADMIN — LISINOPRIL 40 MG: 40 TABLET ORAL at 12:49:00

## 2020-03-04 RX ADMIN — CARVEDILOL 25 MG: 12.5 TABLET ORAL at 17:44:00

## 2020-03-04 RX ADMIN — HYDROMORPHONE HYDROCHLORIDE 0.4 MG: 1 INJECTION, SOLUTION INTRAMUSCULAR; INTRAVENOUS; SUBCUTANEOUS at 15:30:00

## 2020-03-04 RX ADMIN — HEPARIN SODIUM 5000 UNITS: 1000 INJECTION, SOLUTION INTRAVENOUS; SUBCUTANEOUS at 08:06:00

## 2020-03-04 RX ADMIN — ASPIRIN 81 MG: 81 TABLET, CHEWABLE ORAL at 17:45:00

## 2020-03-04 RX ADMIN — SODIUM CHLORIDE 20 ML/HR: 9 INJECTION, SOLUTION INTRAVENOUS at 10:50:00

## 2020-03-04 RX ADMIN — ROCURONIUM BROMIDE 80 MG: 10 INJECTION, SOLUTION INTRAVENOUS at 07:07:00

## 2020-03-04 RX ADMIN — ROCURONIUM BROMIDE 20 MG: 10 INJECTION, SOLUTION INTRAVENOUS at 07:48:00

## 2020-03-04 RX ADMIN — ROCURONIUM BROMIDE 10 MG: 10 INJECTION, SOLUTION INTRAVENOUS at 08:53:00

## 2020-03-04 RX ADMIN — FUROSEMIDE 20 MG: 10 INJECTION INTRAMUSCULAR; INTRAVENOUS at 15:30:00

## 2020-03-04 RX ADMIN — HEPARIN SODIUM 2000 UNITS: 1000 INJECTION, SOLUTION INTRAVENOUS; SUBCUTANEOUS at 09:19:00

## 2020-03-04 RX ADMIN — HEPARIN SODIUM 5000 UNITS: 1000 INJECTION, SOLUTION INTRAVENOUS; SUBCUTANEOUS at 08:34:00

## 2020-03-04 RX ADMIN — PROTAMINE SULFATE 40 MG: 10 INJECTION, SOLUTION INTRAVENOUS at 09:44:00

## 2020-03-04 RX ADMIN — HYDROCODONE BITARTRATE AND ACETAMINOPHEN 2 TABLET: 5; 325 TABLET ORAL at 21:56:00

## 2020-03-04 RX ADMIN — DULOXETIN HYDROCHLORIDE 60 MG: 30 CAPSULE, DELAYED RELEASE ORAL at 20:23:00

## 2020-03-04 RX ADMIN — HYDROMORPHONE HYDROCHLORIDE 0.4 MG: 1 INJECTION, SOLUTION INTRAMUSCULAR; INTRAVENOUS; SUBCUTANEOUS at 11:23:00

## 2020-03-04 RX ADMIN — FAMOTIDINE 20 MG: 10 INJECTION, SOLUTION INTRAVENOUS at 12:48:00

## 2020-03-04 RX ADMIN — ROCURONIUM BROMIDE 20 MG: 10 INJECTION, SOLUTION INTRAVENOUS at 08:23:00

## 2020-03-04 NOTE — PROGRESS NOTES
77 y/o male with hx Watchman, afib, pacer, DM, minimal CAD and severe MR    Osmar/Eliot  TETE Carson    RFV and 2 Perclose. Under TETE guidance crossed from right atrium to left atrium with Qian. Septum somewhat thick.  Sheath placed to left atrium over S

## 2020-03-04 NOTE — PROGRESS NOTES
Prelim procedure    Two NTR Mitraclip devices placed - excellent result -- reduction in MR from severe to trivial    Mean MV gradient baseline 2 -- post 5 mmHg    Perclose right CFV

## 2020-03-04 NOTE — ANESTHESIA PROCEDURE NOTES
Airway  Date/Time: 3/4/2020 7:15 AM  Urgency: elective    Airway not difficult    General Information and Staff    Patient location during procedure: OR  Anesthesiologist: Ramón Campos MD  Performed: anesthesiologist     Indications and Patient Conditio

## 2020-03-04 NOTE — CONSULTS
[facility]      Endocrinology Consultation    Simon Dacosta Patient Status:  Inpatient    7/10/1950 MRN [id number]   [facility] 6NE-A Attending Venkatesh Keith MD   Hosp Day # 0 PCP Piper Mitchell MD     Reason for Consultation:  T Tab, Take 1 tablet (40 mg total) by mouth daily. , Disp: 30 tablet, Rfl: 2, 3/3/2020 at pm  aspirin 81 MG Oral Chew Tab, Chew 1 tablet (81 mg total) by mouth daily. , Disp: 30 tablet, Rfl: 1, 3/4/2020 at am  Insulin Lispro 100 UNIT/ML Subcutaneous Solution, 6 tabs two times per day , Disp: , Rfl: , 4/6/7969 at pm  Garlic 6939 MG Oral Cap, Take 2 capsules by mouth 2 (two) times daily.  Take 2 tabs two times per day , Disp: , Rfl: , 3/3/2020 at pm  LANTUS SOLOSTAR 100 UNIT/ML Subcutaneous Solution Pen-injector, drugs.     Allergies:  No Known Allergies    Medications:    Current Facility-Administered Medications:   •  0.9% NaCl infusion, , Intravenous, Continuous  •  glucose (DEX4) oral liquid 15 g, 15 g, Oral, Q15 Min PRN **OR** Glucose-Vitamin C (DEX-4) chewable #3) 300-30 MG tab 1 tablet, 1 tablet, Oral, Q4H PRN **OR** Acetaminophen-Codeine #3 (TYLENOL #3) 300-30 MG tab 2 tablet, 2 tablet, Oral, Q4H PRN  •  docusate sodium (COLACE) cap 100 mg, 100 mg, Oral, BID  •  PEG 3350 (MIRALAX) powder packet 17 g, 17 g, Ora place, time    Labs  Lab Results   Component Value Date    WBC 12.2 03/04/2020    HGB 9.6 03/04/2020    HCT 31.9 03/04/2020    .0 03/04/2020    CREATSERUM 1.39 03/04/2020    BUN 21 03/04/2020     03/04/2020    K 3.9 03/04/2020     03/04/

## 2020-03-04 NOTE — OPERATIVE REPORT
[address]    PATIENT'S NAME: Kylie Baptiste   ATTENDING PHYSICIAN: Emilio Duenas M.D. OPERATING PHYSICIAN: Neno Lucas M.D.    PATIENT ACCOUNT#:   [de-identified]    LOCATION:  [address] [place] [address]  MEDICAL RECORD #:   [id number]       DATE OF BIR Trace mitral regurgitation was present postprocedure. The mean gradient prior to the procedure was 2 mmHg and at the end of the procedure 5 mmHg. We made a couple of adjustments in the second clip to achieve this acceptable gradient.     The patient simona

## 2020-03-04 NOTE — ANESTHESIA POSTPROCEDURE EVALUATION
Frank 23 Patient Status:  Inpatient   Age/Gender 78year old male MRN [id number]   [facility] 6NE-A Attending Patiana rosa Mesa MD   Hosp Day # 0 PCP Gabriel Daniel MD       Anesthesia Post-op Note    * No procedures

## 2020-03-04 NOTE — H&P
History & Physical Examination    Patient Name: Neil Alejo  MRN: [id number]  CSN: [id number]  YOB: 1950    Diagnosis: Severe, Symptomatic Mitral regurgitation    Present Illness:   Neil Alejo is a 78year old male who, referred Rfl: , 3/3/2020 at pm  FINASTERIDE 5 MG Oral Tab, TAKE 1 TABLET BY MOUTH ONCE DAILY, Disp: 90 tablet, Rfl: 3, 3/3/2020 at pm  lisinopril 40 MG Oral Tab, Take 1 tablet (40 mg total) by mouth daily. , Disp: 30 tablet, Rfl: 2, 3/3/2020 at pm  aspirin 81 MG Ora times daily. Take 1 tab two times a day , Disp: , Rfl: , 3/3/2020 at pm  Cranberry 450 MG Oral Tab, Take 6 tablets by mouth 2 (two) times daily.  Take 6 tabs two times per day , Disp: , Rfl: , 1/1/6171 at pm  Garlic 3874 MG Oral Cap, Take 2 capsules by mout Other (rheumatoid arthritis) Mother      Social History    Tobacco Use      Smoking status: Former Smoker        Packs/day: 2.00        Years: 30.00        Pack years: 61        Quit date: 2000        Years since quittin.3      Smokeless tobacco

## 2020-03-04 NOTE — ANESTHESIA PROCEDURE NOTES
Arterial Line  Performed by: Mallory Pelletier MD  Authorized by: Mallory Pelletier MD     General Information and Staff    Procedure Start:  3/4/2020 7:15 AM  Procedure End:  3/4/2020 7:20 AM  Anesthesiologist:  Mallory Pelletier MD  Performed By:  Kumar Lopez

## 2020-03-04 NOTE — HISTORICAL OFFICE NOTE
[facility]  Cardiology [address]    Reason for Consultation:  Severe mitral regurgitation and mund-kv-sfsu mitral clip evaluation     History of Present Illness:  Susan Gomes is a a(n) 78year old male who APPENDECTOMY   19669   • CARDIAC PACEMAKER PLACEMENT       • CHOLECYSTECTOMY       • CONTACT LASER SURGERY OF PROSTATE   2012   • CYSTOSCOPY N/A 11/3/2019     Performed by Gopi Paula MD at [facility] [place] [facility] OR   • HERNIA SURGERY   2000   • OTHER SURGICAL HI Cap, Take 12.5 mg by mouth 2 (two) times daily. , Disp: , Rfl:   •  Potassium Gluconate 595 MG Oral Cap, Take 2 capsules by mouth daily.   , Disp: , Rfl:   •  FOLIC ACID 1 MG Oral Tab, TAKE TWO TABLETS BY MOUTH ONCE DAILY (Patient taking differently: Take Cap, 2 tablets nightly.  , Disp: , Rfl:      Review of Systems:  A comprehensive review of systems was negative if not otherwise mention in above HPI.     BP: ()/()   AO: ()/()   No data recorded.      No intake or output data in the 24 hours ending 02/20/2 non-mobile present. 3. Mitral valve: Moderate to severe regurgitation. Systolic flow reversal in 2/4 pulmonary veins Mitral regurgitation possibly secondary to mild A2 prolapse. 4. Left atrium: No evidence of thrombus in the atrial cavity or appendage.  L

## 2020-03-04 NOTE — OPERATIVE REPORT
[facility]    PATIENT'S NAME: Robina Stager   ATTENDING PHYSICIAN: Pacheco Doran M.D. OPERATING PHYSICIAN: Pacheco Doran M.D.    PATIENT ACCOUNT#:   [de-identified]    LOCATION:  [address] [place] [address]  MEDICAL RECORD #:   [id number]       DATE OF GREGG

## 2020-03-04 NOTE — PROCEDURES
Cardiology Transesophageal Echo Note    PRE and POST PROCEDURE DIAGNOSIS:   1.  Mitral regurgitation    PROCEDURE: Intra-operative transesophageal echocardiogram (TETE) for pxye-cz-gfut mitral clip procedure    The patient was already intubated and sedated b a second rfot-at-dmis NTR mitral clip over the mitral valve until grasp lateral to first spek-tx-cyub clip but since mean gradient was 6-7 mmHg it was moved closer to first lqtd-yf-ucyi clip with resultant mean gradient across the mitral valve of 5-6 mmHg

## 2020-03-05 ENCOUNTER — APPOINTMENT (OUTPATIENT)
Dept: GENERAL RADIOLOGY | Facility: HOSPITAL | Age: 70
DRG: 267 | End: 2020-03-05
Attending: NURSE PRACTITIONER
Payer: MEDICARE

## 2020-03-05 ENCOUNTER — APPOINTMENT (OUTPATIENT)
Dept: CV DIAGNOSTICS | Facility: HOSPITAL | Age: 70
DRG: 267 | End: 2020-03-05
Attending: NURSE PRACTITIONER
Payer: MEDICARE

## 2020-03-05 LAB
ANION GAP SERPL CALC-SCNC: 7 MMOL/L (ref 0–18)
BUN BLD-MCNC: 20 MG/DL (ref 7–18)
BUN/CREAT SERPL: 15.7 (ref 10–20)
CALCIUM BLD-MCNC: 8.2 MG/DL (ref 8.5–10.1)
CHLORIDE SERPL-SCNC: 110 MMOL/L (ref 98–112)
CO2 SERPL-SCNC: 23 MMOL/L (ref 21–32)
CREAT BLD-MCNC: 1.27 MG/DL (ref 0.7–1.3)
DEPRECATED RDW RBC AUTO: 57.4 FL (ref 35.1–46.3)
ERYTHROCYTE [DISTWIDTH] IN BLOOD BY AUTOMATED COUNT: 17.1 % (ref 11–15)
GLUCOSE BLD-MCNC: 141 MG/DL (ref 70–99)
GLUCOSE BLD-MCNC: 149 MG/DL (ref 70–99)
GLUCOSE BLD-MCNC: 166 MG/DL (ref 70–99)
GLUCOSE BLD-MCNC: 288 MG/DL (ref 70–99)
HAV IGM SER QL: 2.2 MG/DL (ref 1.6–2.6)
HAV IGM SER QL: 2.2 MG/DL (ref 1.6–2.6)
HCT VFR BLD AUTO: 29.4 % (ref 39–53)
HGB BLD-MCNC: 9 G/DL (ref 13–17.5)
INR BLD: 1.06 (ref 0.9–1.1)
MCH RBC QN AUTO: 28.3 PG (ref 26–34)
MCHC RBC AUTO-ENTMCNC: 30.6 G/DL (ref 31–37)
MCV RBC AUTO: 92.5 FL (ref 80–100)
OSMOLALITY SERPL CALC.SUM OF ELEC: 295 MOSM/KG (ref 275–295)
PLATELET # BLD AUTO: 122 10(3)UL (ref 150–450)
POTASSIUM SERPL-SCNC: 3.3 MMOL/L (ref 3.5–5.1)
POTASSIUM SERPL-SCNC: 3.7 MMOL/L (ref 3.5–5.1)
PSA SERPL DL<=0.01 NG/ML-MCNC: 14.3 SECONDS (ref 12.5–14.7)
RBC # BLD AUTO: 3.18 X10(6)UL (ref 3.8–5.8)
SODIUM SERPL-SCNC: 140 MMOL/L (ref 136–145)
WBC # BLD AUTO: 13 X10(3) UL (ref 4–11)

## 2020-03-05 PROCEDURE — 93306 TTE W/DOPPLER COMPLETE: CPT | Performed by: NURSE PRACTITIONER

## 2020-03-05 PROCEDURE — 93010 ELECTROCARDIOGRAM REPORT: CPT | Performed by: INTERNAL MEDICINE

## 2020-03-05 PROCEDURE — 93005 ELECTROCARDIOGRAM TRACING: CPT

## 2020-03-05 PROCEDURE — 99233 SBSQ HOSP IP/OBS HIGH 50: CPT | Performed by: INTERNAL MEDICINE

## 2020-03-05 PROCEDURE — 84132 ASSAY OF SERUM POTASSIUM: CPT | Performed by: INTERNAL MEDICINE

## 2020-03-05 PROCEDURE — 97161 PT EVAL LOW COMPLEX 20 MIN: CPT

## 2020-03-05 PROCEDURE — 97116 GAIT TRAINING THERAPY: CPT

## 2020-03-05 PROCEDURE — 85027 COMPLETE CBC AUTOMATED: CPT | Performed by: NURSE PRACTITIONER

## 2020-03-05 PROCEDURE — 97535 SELF CARE MNGMENT TRAINING: CPT

## 2020-03-05 PROCEDURE — 83735 ASSAY OF MAGNESIUM: CPT | Performed by: INTERNAL MEDICINE

## 2020-03-05 PROCEDURE — 71045 X-RAY EXAM CHEST 1 VIEW: CPT | Performed by: NURSE PRACTITIONER

## 2020-03-05 PROCEDURE — 83735 ASSAY OF MAGNESIUM: CPT | Performed by: NURSE PRACTITIONER

## 2020-03-05 PROCEDURE — 97165 OT EVAL LOW COMPLEX 30 MIN: CPT

## 2020-03-05 PROCEDURE — 82962 GLUCOSE BLOOD TEST: CPT

## 2020-03-05 PROCEDURE — 80048 BASIC METABOLIC PNL TOTAL CA: CPT | Performed by: NURSE PRACTITIONER

## 2020-03-05 PROCEDURE — 85610 PROTHROMBIN TIME: CPT | Performed by: NURSE PRACTITIONER

## 2020-03-05 RX ORDER — FUROSEMIDE 10 MG/ML
20 INJECTION INTRAMUSCULAR; INTRAVENOUS ONCE
Status: DISCONTINUED | OUTPATIENT
Start: 2020-03-05 | End: 2020-03-05

## 2020-03-05 RX ORDER — POTASSIUM CHLORIDE 20 MEQ/1
40 TABLET, EXTENDED RELEASE ORAL EVERY 4 HOURS
Status: COMPLETED | OUTPATIENT
Start: 2020-03-05 | End: 2020-03-05

## 2020-03-05 RX ORDER — IPRATROPIUM BROMIDE AND ALBUTEROL SULFATE 2.5; .5 MG/3ML; MG/3ML
3 SOLUTION RESPIRATORY (INHALATION) EVERY 6 HOURS PRN
Status: DISCONTINUED | OUTPATIENT
Start: 2020-03-05 | End: 2020-03-06

## 2020-03-05 RX ORDER — POTASSIUM CHLORIDE 20 MEQ/1
40 TABLET, EXTENDED RELEASE ORAL ONCE
Status: COMPLETED | OUTPATIENT
Start: 2020-03-05 | End: 2020-03-05

## 2020-03-05 RX ORDER — FUROSEMIDE 20 MG/1
20 TABLET ORAL DAILY
Status: DISCONTINUED | OUTPATIENT
Start: 2020-03-06 | End: 2020-03-06

## 2020-03-05 RX ORDER — AMLODIPINE BESYLATE 5 MG/1
5 TABLET ORAL DAILY
Status: DISCONTINUED | OUTPATIENT
Start: 2020-03-05 | End: 2020-03-06

## 2020-03-05 RX ORDER — FUROSEMIDE 10 MG/ML
80 INJECTION INTRAMUSCULAR; INTRAVENOUS ONCE
Status: COMPLETED | OUTPATIENT
Start: 2020-03-05 | End: 2020-03-05

## 2020-03-05 RX ORDER — LISINOPRIL 40 MG/1
40 TABLET ORAL DAILY
Status: DISCONTINUED | OUTPATIENT
Start: 2020-03-05 | End: 2020-03-05

## 2020-03-05 RX ADMIN — FAMOTIDINE 20 MG: 20 TABLET ORAL at 20:47:00

## 2020-03-05 RX ADMIN — ASPIRIN 81 MG: 81 TABLET, CHEWABLE ORAL at 10:16:00

## 2020-03-05 RX ADMIN — HYDROCHLOROTHIAZIDE 12.5 MG: 12.5 CAPSULE, GELATIN COATED ORAL at 10:15:00

## 2020-03-05 RX ADMIN — FOLIC ACID 1 MG: 1 TABLET ORAL at 10:14:00

## 2020-03-05 RX ADMIN — FUROSEMIDE 80 MG: 10 INJECTION INTRAMUSCULAR; INTRAVENOUS at 09:51:00

## 2020-03-05 RX ADMIN — HYDROCODONE BITARTRATE AND ACETAMINOPHEN 2 TABLET: 5; 325 TABLET ORAL at 22:17:00

## 2020-03-05 RX ADMIN — HYDRALAZINE HYDROCHLORIDE 10 MG: 20 INJECTION INTRAMUSCULAR; INTRAVENOUS at 22:09:00

## 2020-03-05 RX ADMIN — HYDROCODONE BITARTRATE AND ACETAMINOPHEN 2 TABLET: 5; 325 TABLET ORAL at 14:45:00

## 2020-03-05 RX ADMIN — LISINOPRIL 40 MG: 40 TABLET ORAL at 17:24:00

## 2020-03-05 RX ADMIN — DULOXETIN HYDROCHLORIDE 60 MG: 30 CAPSULE, DELAYED RELEASE ORAL at 10:15:00

## 2020-03-05 RX ADMIN — DOCUSATE SODIUM 100 MG: 100 CAPSULE, LIQUID FILLED ORAL at 10:15:00

## 2020-03-05 RX ADMIN — POTASSIUM CHLORIDE 40 MEQ: 20 TABLET, EXTENDED RELEASE ORAL at 21:07:00

## 2020-03-05 RX ADMIN — CLOPIDOGREL BISULFATE 75 MG: 75 TABLET ORAL at 10:16:00

## 2020-03-05 RX ADMIN — HYDRALAZINE HYDROCHLORIDE 10 MG: 20 INJECTION INTRAMUSCULAR; INTRAVENOUS at 02:15:00

## 2020-03-05 RX ADMIN — HYDRALAZINE HYDROCHLORIDE 10 MG: 20 INJECTION INTRAMUSCULAR; INTRAVENOUS at 06:51:00

## 2020-03-05 RX ADMIN — HYDRALAZINE HYDROCHLORIDE 10 MG: 20 INJECTION INTRAMUSCULAR; INTRAVENOUS at 15:43:00

## 2020-03-05 RX ADMIN — CARVEDILOL 25 MG: 12.5 TABLET ORAL at 03:45:00

## 2020-03-05 RX ADMIN — CARVEDILOL 25 MG: 12.5 TABLET ORAL at 17:24:00

## 2020-03-05 RX ADMIN — HYDROCHLOROTHIAZIDE 12.5 MG: 12.5 CAPSULE, GELATIN COATED ORAL at 20:47:00

## 2020-03-05 RX ADMIN — FINASTERIDE 5 MG: 5 TABLET, FILM COATED ORAL at 17:24:00

## 2020-03-05 RX ADMIN — DOCUSATE SODIUM 100 MG: 100 CAPSULE, LIQUID FILLED ORAL at 20:46:00

## 2020-03-05 RX ADMIN — DIPHENHYDRAMINE HCL 50 MG: 50 MG CAPSULE ORAL at 22:17:00

## 2020-03-05 RX ADMIN — POTASSIUM CHLORIDE 40 MEQ: 20 TABLET, EXTENDED RELEASE ORAL at 10:17:00

## 2020-03-05 RX ADMIN — AMLODIPINE BESYLATE 5 MG: 5 TABLET ORAL at 14:45:00

## 2020-03-05 RX ADMIN — MULTIPLE VITAMINS W/ MINERALS TAB 1 TABLET: 9MG-400MCG TAB ORAL at 10:15:00

## 2020-03-05 RX ADMIN — HYDROCODONE BITARTRATE AND ACETAMINOPHEN 1 TABLET: 5; 325 TABLET ORAL at 10:38:00

## 2020-03-05 RX ADMIN — FOLIC ACID 1 MG: 1 TABLET ORAL at 20:46:00

## 2020-03-05 RX ADMIN — TAMSULOSIN HYDROCHLORIDE 0.8 MG: 0.4 CAPSULE ORAL at 22:17:00

## 2020-03-05 RX ADMIN — HYDROCODONE BITARTRATE AND ACETAMINOPHEN 1 TABLET: 5; 325 TABLET ORAL at 03:19:00

## 2020-03-05 RX ADMIN — FAMOTIDINE 20 MG: 20 TABLET ORAL at 10:15:00

## 2020-03-05 RX ADMIN — POTASSIUM CHLORIDE 40 MEQ: 20 TABLET, EXTENDED RELEASE ORAL at 14:45:00

## 2020-03-05 RX ADMIN — DULOXETIN HYDROCHLORIDE 60 MG: 30 CAPSULE, DELAYED RELEASE ORAL at 20:46:00

## 2020-03-05 NOTE — CM/SW NOTE
03/05/20 1500   CM/SW Screening   Referral Source    Information Source Chart review;[facility] staff   Patient's Mental Status Alert;Oriented   Patient lives with Spouse   Patient Status Prior to Admission   Independent with ADLs and Mobility Yes

## 2020-03-05 NOTE — PROGRESS NOTES
03/05/20 1050   Incentive Spirometry Tx   Frequency q1hr W/A   Patient Technique Fair   Treatment Tolerance Tolerated well   Incentive Spirometry Goal (mL) 3150 mL   Incentive Spirometry Achieved (mL) 1000 mL   Number of breaths 10   Breath hold Poor

## 2020-03-05 NOTE — PROGRESS NOTES
Assumed care at 0300, BP elevated but patient was  up to the bathroom recently. Noted to be dyspneac on exertion. Afib, controlled. Morning dose of Norco & Carvedilol given early for BP control. Plan for Echocardiogram today.  Anticipating d/c home t

## 2020-03-05 NOTE — PLAN OF CARE
Assumed care of pt at [facility]. Sitting in chair NAD. Monitor vent paced. Mumur per ausc. R groin site soft no hematoma drsg intact. Pedal pulses palpable. Left radial AL, PRN med given for SBP>160.   Up to B/R, 2 assist, Tachypneic, 02 sats 96% request NC.

## 2020-03-05 NOTE — PHYSICAL THERAPY NOTE
PHYSICAL THERAPY QUICK EVALUATION - INPATIENT    Room Number: 1350/0194-G  Evaluation Date: 3/5/2020  Presenting Problem: S/p Mitral clip on 3/4/20  Physician Order: PT Eval and Treat    History Related to Current Admission:Pt is s/p mitral clip on 3/4/2 extremity ROM and strength -see OT evaluation    Lower extremity ROM is within functional limits - L ankle deviation into external rotation due to an ankle fx in the past.    Lower extremity strength is within functional limits - L ankle strength 4/5, othe pattern w/ cues for proper technique (sitting eob). Pt completed Modified Alas - see results below. Pt completed sit to supine w/ modified independence.   Pt encouraged to cont pacing himself upon return home and use rw as needed to support respiratory st to transfer Safely and independently   Patient able to ambulate on level surfaces Safely and independently

## 2020-03-05 NOTE — OCCUPATIONAL THERAPY NOTE
OCCUPATIONAL THERAPY QUICK EVALUATION - INPATIENT    Room Number: 1223/7914-U  Evaluation Date: 3/5/2020     Type of Evaluation: Initial  Presenting Problem: Mitral Clip    Physician Order: IP Consult to Occupational Therapy  Reason for Therapy:  ADL/IADL COGNITION  WNL    RANGE OF MOTION AND STRENGTH ASSESSMENT  Upper extremity ROM is within functional limits     Upper extremity strength is within functional limits     NEUROLOGICAL FINDINGS                   ACTIVITY TOLERANCE                         O performed all ADL tasks, functional transfers, dynamic reaching and functional mobility safely, without loss of balance, and at supervision level or above. Patient reports no further questions or concerns about safe return to I/ADL tasks.  No further OT ser

## 2020-03-05 NOTE — PHYSICAL THERAPY NOTE
Physical Therapy    Attempted to eval pt this a.m., but pt currently sob and started on Lasix. Flower Lainez requesting pt be seen this p.m. Will re-attempt as schedule allows.

## 2020-03-05 NOTE — RESPIRATORY THERAPY NOTE
GAB Equipment Usage Summary :            Set Mode :CPAP           Usage in Hours:6.9          90% Pressure (EPAP) :9            90% Insp Pressure (IPAP);           AHI : 0.1          Supplemental Oxygen LPM :2

## 2020-03-05 NOTE — PROGRESS NOTES
[facility]  Endocrinology Progress Note    Luis Manuel Kraus Patient Status:  Inpatient    7/10/1950 MRN [id number]   [facility] Attending Fabian Lemon MD   Hosp Day # 1 PCP Betsey Underwood MD     Subjective:  Has had a difficu 03/05/2020     03/05/2020    CA 8.2 03/05/2020    INR 1.06 03/05/2020    PTP 14.3 03/05/2020    MG 2.2 03/05/2020    PGLU 166 03/05/2020       Recent Labs     03/04/20  0607 03/04/20  1028 03/04/20  1743 03/04/20  1832 03/04/20  1921 03/04/20  2100

## 2020-03-05 NOTE — DIETARY NOTE
Clinical Nutrition     Received consult for Diet ed for pt. At time of visit, pt reports he feels \"horrible\" and reports he is SOB. Pt would prefer to defer education at this time. Handout with contact info left at bedside.  RD to revisit as appropriate

## 2020-03-05 NOTE — PLAN OF CARE
Assumed care of pt at 0730. Pt day 1 post op mitral clip procedure. Pt with mild dyspnea and pursed lip breathing at rest and with more severe shortness of breath with minimal activity up to the bathroom.  PT improved after given IV lasix 80mg with good diu

## 2020-03-05 NOTE — PROGRESS NOTES
[facility] Progress Note    Subjective:   OOB in chair this am POD # 1 s/p Mitraclip placement x 2. He currently c/o  SOB, remians on O2 2L NC. He currently denies CP, palpitations, or dizziness.   Remains HTN this am, received po home anti described above. There was decrease in mitral regurgitation from moderate-severe to trivial with mitral gradient 5 mmHg.       CXR: 3/5/2020  Stable cardiomegaly. Stable vascular congestion.   There may be trace interstitial opacities, possibly trace edema mouth daily with dinner. 4,000 units with breakfast and 2,000 units with dinner, Disp: , Rfl:   Sennosides-Docusate Sodium 8.6-50 MG Oral Tab, Take 2 tablets by mouth 2 (two) times daily.   , Disp: , Rfl:   DULoxetine HCl 60 MG Oral Cap DR Particles, TAKE 1 Misc, Use three times daily with insulin injections. , Disp: , Rfl:   ULTICARE MINI PEN NEEDLES 31G X 6 MM Does not apply Misc, , Disp: , Rfl:       Assessment:  1.   POD #1 s/p Mitraclip placement x 2 - reduction in MR from severe to trivial, MV gradient ba

## 2020-03-05 NOTE — PLAN OF CARE
Assumed care 1030 from OR, mitral clip X2. A-line. Right groin perclose X2. Right groin soft, no hematoma, +2 pedal pulses. Monitor shows A-fib. Lung sounds clear/diminished. Patient self cath's at home, ok to use his catheters per [facility] APN.  Up to chair

## 2020-03-06 ENCOUNTER — APPOINTMENT (OUTPATIENT)
Dept: GENERAL RADIOLOGY | Facility: HOSPITAL | Age: 70
DRG: 267 | End: 2020-03-06
Attending: NURSE PRACTITIONER
Payer: MEDICARE

## 2020-03-06 VITALS
SYSTOLIC BLOOD PRESSURE: 143 MMHG | OXYGEN SATURATION: 98 % | DIASTOLIC BLOOD PRESSURE: 53 MMHG | RESPIRATION RATE: 25 BRPM | HEART RATE: 66 BPM | BODY MASS INDEX: 36 KG/M2 | TEMPERATURE: 98 F | WEIGHT: 262.81 LBS

## 2020-03-06 LAB
ABSOLUTE IMMATURE GRANULOCYTES (OFFPRE24): NORMAL
ANION GAP SERPL CALC-SCNC: 4 MMOL/L
ANION GAP SERPL CALC-SCNC: 4 MMOL/L (ref 0–18)
ATRIAL RATE: 40 BPM
ATRIAL RATE: 40 BPM
ATRIAL RATE: 63 BPM
ATRIAL RATE: 70 BPM
BASO+EOS+MONOS # BLD: NORMAL 10*3/UL
BASO+EOS+MONOS NFR BLD: NORMAL %
BASOPHILS # BLD: NORMAL 10*3/UL
BASOPHILS NFR BLD: NORMAL %
BUN BLD-MCNC: 19 MG/DL (ref 7–18)
BUN SERPL-MCNC: 19 MG/DL
BUN/CREAT SERPL: 14.2
BUN/CREAT SERPL: 14.2 (ref 10–20)
CALCIUM BLD-MCNC: 8.4 MG/DL (ref 8.5–10.1)
CALCIUM SERPL-MCNC: 8.4 MG/DL
CHLORIDE SERPL-SCNC: 109 MMOL/L
CHLORIDE SERPL-SCNC: 109 MMOL/L (ref 98–112)
CO2 SERPL-SCNC: 28 MMOL/L
CO2 SERPL-SCNC: 28 MMOL/L (ref 21–32)
CREAT BLD-MCNC: 1.34 MG/DL (ref 0.7–1.3)
CREAT SERPL-MCNC: 1.34 MG/DL
DEPRECATED RDW RBC AUTO: 57.3 FL (ref 35.1–46.3)
DIFFERENTIAL METHOD BLD: NORMAL
EOSINOPHIL # BLD: NORMAL 10*3/UL
EOSINOPHIL NFR BLD: NORMAL %
ERYTHROCYTE [DISTWIDTH] IN BLOOD BY AUTOMATED COUNT: 17.2 % (ref 11–15)
ERYTHROCYTE [DISTWIDTH] IN BLOOD: NORMAL %
GLUCOSE BLD-MCNC: 137 MG/DL (ref 70–99)
GLUCOSE BLD-MCNC: 167 MG/DL (ref 70–99)
GLUCOSE BLD-MCNC: 206 MG/DL (ref 70–99)
GLUCOSE SERPL-MCNC: 137 MG/DL
HCT VFR BLD AUTO: 29.1 % (ref 39–53)
HCT VFR BLD CALC: 29.1 %
HGB BLD-MCNC: 9 G/DL
HGB BLD-MCNC: 9 G/DL (ref 13–17.5)
IMMATURE GRANULOCYTES (OFFPRE25): NORMAL
LENGTH OF FAST TIME PATIENT: NORMAL H
LYMPHOCYTES # BLD: NORMAL 10*3/UL
LYMPHOCYTES NFR BLD: NORMAL %
MCH RBC QN AUTO: 28.7 PG (ref 26–34)
MCH RBC QN AUTO: NORMAL PG
MCHC RBC AUTO-ENTMCNC: 30.9 G/DL (ref 31–37)
MCHC RBC AUTO-ENTMCNC: NORMAL G/DL
MCV RBC AUTO: 92.7 FL (ref 80–100)
MCV RBC AUTO: NORMAL FL
MONOCYTES # BLD: NORMAL 10*3/UL
MONOCYTES NFR BLD: NORMAL %
MPV (OFFPRE2): NORMAL
NEUTROPHILS # BLD: NORMAL 10*3/UL
NEUTROPHILS NFR BLD: NORMAL %
NRBC BLD MANUAL-RTO: NORMAL %
OSMOLALITY SERPL CALC.SUM OF ELEC: 296 MOSM/KG (ref 275–295)
PLAT MORPH BLD: NORMAL
PLATELET # BLD AUTO: 115 10(3)UL (ref 150–450)
PLATELET # BLD: 115 10*3/UL
POTASSIUM SERPL-SCNC: 3.5 MMOL/L
POTASSIUM SERPL-SCNC: 3.5 MMOL/L (ref 3.5–5.1)
POTASSIUM SERPL-SCNC: 3.8 MMOL/L (ref 3.5–5.1)
Q-T INTERVAL: 486 MS
Q-T INTERVAL: 504 MS
Q-T INTERVAL: 530 MS
Q-T INTERVAL: 592 MS
QRS DURATION: 150 MS
QRS DURATION: 154 MS
QRS DURATION: 160 MS
QRS DURATION: 164 MS
QTC CALCULATION (BEZET): 515 MS
QTC CALCULATION (BEZET): 524 MS
QTC CALCULATION (BEZET): 544 MS
QTC CALCULATION (BEZET): 639 MS
R AXIS: -45 DEGREES
R AXIS: 223 DEGREES
R AXIS: 228 DEGREES
R AXIS: 243 DEGREES
RBC # BLD AUTO: 3.14 X10(6)UL (ref 3.8–5.8)
RBC # BLD: 3.14 10*6/UL
RBC MORPH BLD: NORMAL
SODIUM SERPL-SCNC: 141 MMOL/L
SODIUM SERPL-SCNC: 141 MMOL/L (ref 136–145)
T AXIS: 180 DEGREES
T AXIS: 64 DEGREES
T AXIS: 68 DEGREES
T AXIS: 69 DEGREES
VENTRICULAR RATE: 57 BPM
VENTRICULAR RATE: 70 BPM
WBC # BLD AUTO: 9.9 X10(3) UL (ref 4–11)
WBC # BLD: 9.9 10*3/UL
WBC MORPH BLD: NORMAL

## 2020-03-06 PROCEDURE — 99233 SBSQ HOSP IP/OBS HIGH 50: CPT | Performed by: INTERNAL MEDICINE

## 2020-03-06 PROCEDURE — 71045 X-RAY EXAM CHEST 1 VIEW: CPT | Performed by: NURSE PRACTITIONER

## 2020-03-06 PROCEDURE — 82962 GLUCOSE BLOOD TEST: CPT

## 2020-03-06 PROCEDURE — 85027 COMPLETE CBC AUTOMATED: CPT | Performed by: NURSE PRACTITIONER

## 2020-03-06 PROCEDURE — 80048 BASIC METABOLIC PNL TOTAL CA: CPT | Performed by: NURSE PRACTITIONER

## 2020-03-06 PROCEDURE — 84132 ASSAY OF SERUM POTASSIUM: CPT | Performed by: INTERNAL MEDICINE

## 2020-03-06 RX ORDER — HYDRALAZINE HYDROCHLORIDE 25 MG/1
25 TABLET, FILM COATED ORAL 3 TIMES DAILY
Status: DISCONTINUED | OUTPATIENT
Start: 2020-03-06 | End: 2020-03-06

## 2020-03-06 RX ORDER — INSULIN LISPRO 100 [IU]/ML
INJECTION, SOLUTION INTRAVENOUS; SUBCUTANEOUS
Refills: 0 | Status: SHIPPED | COMMUNITY
Start: 2020-03-06

## 2020-03-06 RX ORDER — FUROSEMIDE 20 MG/1
40 TABLET ORAL DAILY
Refills: 0 | Status: SHIPPED | COMMUNITY
Start: 2020-03-06 | End: 2020-06-03

## 2020-03-06 RX ORDER — AMLODIPINE BESYLATE 5 MG/1
5 TABLET ORAL ONCE
Status: COMPLETED | OUTPATIENT
Start: 2020-03-06 | End: 2020-03-06

## 2020-03-06 RX ORDER — AMLODIPINE BESYLATE 5 MG/1
10 TABLET ORAL DAILY
Status: DISCONTINUED | OUTPATIENT
Start: 2020-03-07 | End: 2020-03-06

## 2020-03-06 RX ORDER — AMLODIPINE BESYLATE 10 MG/1
10 TABLET ORAL DAILY
Qty: 30 TABLET | Refills: 3 | Status: SHIPPED | OUTPATIENT
Start: 2020-03-07

## 2020-03-06 RX ORDER — HYDRALAZINE HYDROCHLORIDE 25 MG/1
25 TABLET, FILM COATED ORAL 3 TIMES DAILY
Qty: 90 TABLET | Refills: 3 | Status: SHIPPED | OUTPATIENT
Start: 2020-03-06 | End: 2021-04-19

## 2020-03-06 RX ORDER — HYDRALAZINE HYDROCHLORIDE 25 MG/1
25 TABLET, FILM COATED ORAL 2 TIMES DAILY
Status: DISCONTINUED | OUTPATIENT
Start: 2020-03-06 | End: 2020-03-06

## 2020-03-06 RX ORDER — POTASSIUM CHLORIDE 20 MEQ/1
40 TABLET, EXTENDED RELEASE ORAL EVERY 4 HOURS
Status: COMPLETED | OUTPATIENT
Start: 2020-03-06 | End: 2020-03-06

## 2020-03-06 RX ORDER — FUROSEMIDE 10 MG/ML
40 INJECTION INTRAMUSCULAR; INTRAVENOUS ONCE
Status: COMPLETED | OUTPATIENT
Start: 2020-03-06 | End: 2020-03-06

## 2020-03-06 RX ADMIN — ASPIRIN 81 MG: 81 TABLET, CHEWABLE ORAL at 09:19:00

## 2020-03-06 RX ADMIN — MULTIPLE VITAMINS W/ MINERALS TAB 1 TABLET: 9MG-400MCG TAB ORAL at 09:19:00

## 2020-03-06 RX ADMIN — HYDRALAZINE HYDROCHLORIDE 25 MG: 25 TABLET, FILM COATED ORAL at 08:45:00

## 2020-03-06 RX ADMIN — AMLODIPINE BESYLATE 5 MG: 5 TABLET ORAL at 09:30:00

## 2020-03-06 RX ADMIN — MELATONIN 1000 MCG: at 08:45:00

## 2020-03-06 RX ADMIN — FOLIC ACID 1 MG: 1 TABLET ORAL at 08:45:00

## 2020-03-06 RX ADMIN — DOCUSATE SODIUM 100 MG: 100 CAPSULE, LIQUID FILLED ORAL at 08:44:00

## 2020-03-06 RX ADMIN — FAMOTIDINE 20 MG: 20 TABLET ORAL at 08:45:00

## 2020-03-06 RX ADMIN — AMLODIPINE BESYLATE 5 MG: 5 TABLET ORAL at 08:45:00

## 2020-03-06 RX ADMIN — POTASSIUM CHLORIDE 40 MEQ: 20 TABLET, EXTENDED RELEASE ORAL at 10:53:00

## 2020-03-06 RX ADMIN — CLOPIDOGREL BISULFATE 75 MG: 75 TABLET ORAL at 09:19:00

## 2020-03-06 RX ADMIN — DULOXETIN HYDROCHLORIDE 60 MG: 30 CAPSULE, DELAYED RELEASE ORAL at 08:46:00

## 2020-03-06 RX ADMIN — FUROSEMIDE 40 MG: 10 INJECTION INTRAMUSCULAR; INTRAVENOUS at 09:25:00

## 2020-03-06 RX ADMIN — AMLODIPINE BESYLATE 5 MG: 5 TABLET ORAL at 06:30:00

## 2020-03-06 RX ADMIN — POTASSIUM CHLORIDE 40 MEQ: 20 TABLET, EXTENDED RELEASE ORAL at 06:31:00

## 2020-03-06 RX ADMIN — HYDROCHLOROTHIAZIDE 12.5 MG: 12.5 CAPSULE, GELATIN COATED ORAL at 08:45:00

## 2020-03-06 RX ADMIN — CARVEDILOL 25 MG: 12.5 TABLET ORAL at 08:44:00

## 2020-03-06 RX ADMIN — HYDROCODONE BITARTRATE AND ACETAMINOPHEN 2 TABLET: 5; 325 TABLET ORAL at 09:19:00

## 2020-03-06 NOTE — CARDIAC REHAB
Cardiac rehab education completed for mitral clip. Printed material provided. Provided Cardiac rehab contact info for [facility] AND [facility] due to location.

## 2020-03-06 NOTE — DIETARY NOTE
Miranda     Admitting diagnosis:  Mitral valve insufficiency, unspecified etiology [I34.0]    Ht:  6'0\"  Wt: 119.2 kg (262 lb 12.6 oz). This is 147 % of IBW  Body mass index is 35.64 kg/m².   IBW: 80.9kg

## 2020-03-06 NOTE — PROGRESS NOTES
Pt discharged to home. Being driven home by wife. Reviewed all medications with patient. Instructed to  new medications at his pharmacy.  Reviewed all discharge education with patient and wife including s/s infection, wound healing, activity, hygiene

## 2020-03-06 NOTE — PROGRESS NOTES
[facility]  Progress Note    Kwaku Novak Patient Status:  Inpatient    7/10/1950 MRN [id number]   [facility] 2NE-A Attending Zohaib De La Garza MD   Hosp Day # 2 PCP Dlyan Powell MD       SUBJECTIVE:  No acute events overnight.   Rima Cross Oral, TID  insulin detemir (LEVEMIR) 100 UNIT/ML flextouch 60 Units, 60 Units, Subcutaneous, Daily  Insulin Aspart Pen (NOVOLOG) 100 UNIT/ML flexpen 1-50 Units, 1-50 Units, Subcutaneous, TID CC  Insulin Aspart Pen (NOVOLOG) 100 UNIT/ML flexpen 1-40 Units, Oral, Daily  aspirin chewable tab 81 mg, 81 mg, Oral, Daily  Clopidogrel Bisulfate (PLAVIX) tab 75 mg, 75 mg, Oral, Daily  HYDROcodone-acetaminophen (NORCO) 5-325 MG per tab 2 tablet, 2 tablet, Oral, Q4H PRN  diphenhydrAMINE (BENADRYL) cap/tab 50 mg, 50 mg

## 2020-03-06 NOTE — PLAN OF CARE
Assumed care of pt at [address] [facility]. Visiting with family NAD. POD#1 S/P Mitral clip implantation x2. Denies c/o pain, discomfort or SOB. Resp unlabored, lungs cl on room air. Monitor AF majority vent paced rare intrinsic. HTN persist PRN hydralazine given.  R coral

## 2020-03-06 NOTE — RESPIRATORY THERAPY NOTE
GAB Equipment Usage Summary :            Set Mode :CPAP           Usage in Hours:8;34          90% Pressure (EPAP) : 9           90% Insp Pressure (IPAP);           AHI : 0          Supplemental Oxygen LPM :

## 2020-03-06 NOTE — PLAN OF CARE
Assumed care 0715. A&O X4, GALLO, following commands. Pain well controlled. Up to chair one assist, walking the unit. Monitor shows v-paced. Right groin soft, no hematoma, +2 pedal pulses. Self-caths. Lung sounds clear/diminished. Dyspnea with exertion.  Heat ordered  Outcome: Progressing     Problem: Impaired Activities of Daily Living  Goal: Achieve highest/safest level of independence in self care  Description  Interventions:  - Assess ability and encourage patient to participate in ADLs to maximize function

## 2020-03-06 NOTE — PROGRESS NOTES
[facility]  Cardiology Progress Note    [facility] Patient Status:  Inpatient    7/10/1950 MRN [id number]   [facility] 6NE-A Attending Brody Espitia MD   Hosp Day # 2 PCP Judi Lowe MD       Subjective:  Sitting up.  Kyrie Herbert Units Subcutaneous Daily   • Insulin Aspart Pen  1-50 Units Subcutaneous TID CC   • Insulin Aspart Pen  1-40 Units Subcutaneous TID CC and HS   • carvedilol  25 mg Oral BID with meals   • cholecalciferol  2,000 Units Oral BID   • Vitamin B-12  1,000 mcg Or regurgitation. 8. Pulmonary arteries: Systolic pressure was markedly increased, in the     range of 55mm Hg to 60mm Hg.     Impressions:   This study is compared with previous dated 12/04/2019:  Compared to the prior study, resolution of mitral regurgitati chronic diastolic heart failure admitted after MitraClip.     Severe MR:  - s/p MitraClip x2 on 3/4/2020,  -Repeat echo demonstrated mean gradient of 8 to 9 mmHg across the mitral valve  -Resuming DAPT, needs 6 months post watchman  -Hypotensive yesterday a

## 2020-03-08 LAB — BLOOD TYPE BARCODE: 6200

## 2020-03-09 NOTE — DISCHARGE SUMMARY
[facility]  Discharge Summary    Sarha Trevino Patient Status:  Inpatient    7/10/1950 MRN [id number]   [facility] 2NE-A Attending No att. providers found   Hosp Day # 2 PCP Sheeba Jay MD         Admit date: 3/4/2020    Addi White history of recovered dilated cardiomyopathy, diabetes, hypertension, GAB on CPAP. Hospital Course: The patient underwent successful Mitraclip placement x2 on 3/4/20.  He tolerated the procedure well, and required no prolonged inotropic or ventilatory Oral Tab  2 tablets in am , 1 in evening and 2 at bedtime, Normal, Disp-150 tablet, R-0    Clopidogrel Bisulfate 75 MG Oral Tab  Take 75 mg by mouth daily. , Historical    carvedilol 25 MG Oral Tab  Take 25 mg by mouth 2 (two) times daily with meals. , Histo UNITS Oral Tab  Take 2 tablets by mouth daily. , Historical    Cyanocobalamin (B-12) 1000 MCG Oral Cap  Take 1 capsule by mouth 2 (two) times daily.  Take 1 tab two times a day , Historical    Cranberry 450 MG Oral Tab  Take 6 tablets by mouth 2 (two) time HEALING:  · If you notice minor bleeding from the puncture wound, please do the following things. If it does not stop with these measures, please notify your doctor immediately:  · Immediately lie flat.    · Apply firm pressure just above the puncture site

## 2020-03-12 ENCOUNTER — OFFICE VISIT (OUTPATIENT)
Dept: CARDIOLOGY | Age: 70
End: 2020-03-12

## 2020-03-12 VITALS
HEIGHT: 72 IN | BODY MASS INDEX: 36.84 KG/M2 | WEIGHT: 272 LBS | RESPIRATION RATE: 18 BRPM | OXYGEN SATURATION: 93 % | HEART RATE: 61 BPM | SYSTOLIC BLOOD PRESSURE: 174 MMHG | DIASTOLIC BLOOD PRESSURE: 60 MMHG

## 2020-03-12 DIAGNOSIS — I50.22 CHRONIC SYSTOLIC HEART FAILURE (CMD): Primary | ICD-10-CM

## 2020-03-12 PROCEDURE — 99214 OFFICE O/P EST MOD 30 MIN: CPT | Performed by: NURSE PRACTITIONER

## 2020-03-12 ASSESSMENT — PATIENT HEALTH QUESTIONNAIRE - PHQ9
SUM OF ALL RESPONSES TO PHQ9 QUESTIONS 1 AND 2: 0
2. FEELING DOWN, DEPRESSED OR HOPELESS: NOT AT ALL
1. LITTLE INTEREST OR PLEASURE IN DOING THINGS: NOT AT ALL
SUM OF ALL RESPONSES TO PHQ9 QUESTIONS 1 AND 2: 0

## 2020-03-17 ENCOUNTER — TELEPHONE (OUTPATIENT)
Dept: CARDIOLOGY | Age: 70
End: 2020-03-17

## 2020-03-17 DIAGNOSIS — I34.0 NONRHEUMATIC MITRAL VALVE REGURGITATION: Primary | ICD-10-CM

## 2020-03-17 DIAGNOSIS — Z98.890 S/P MITRAL VALVE REPAIR: ICD-10-CM

## 2020-03-23 ENCOUNTER — TELEPHONE (OUTPATIENT)
Dept: CARDIOLOGY | Age: 70
End: 2020-03-23

## 2020-03-23 RX ORDER — FUROSEMIDE 20 MG/1
TABLET ORAL
Qty: 270 TABLET | Refills: 0 | Status: SHIPPED | OUTPATIENT
Start: 2020-03-23 | End: 2021-03-31 | Stop reason: ALTCHOICE

## 2020-03-24 RX ORDER — CLOPIDOGREL BISULFATE 75 MG/1
75 TABLET ORAL
COMMUNITY
Start: 2020-02-17

## 2020-03-24 RX ORDER — AMLODIPINE BESYLATE 10 MG/1
10 TABLET ORAL
COMMUNITY
Start: 2020-03-07 | End: 2020-07-09 | Stop reason: SDUPTHER

## 2020-03-24 RX ORDER — HYDRALAZINE HYDROCHLORIDE 25 MG/1
25 TABLET, FILM COATED ORAL 3 TIMES DAILY
COMMUNITY
Start: 2020-03-06 | End: 2020-05-13 | Stop reason: DRUGHIGH

## 2020-03-24 RX ORDER — INSULIN LISPRO 100 [IU]/ML
INJECTION, SOLUTION INTRAVENOUS; SUBCUTANEOUS
COMMUNITY
Start: 2020-03-21

## 2020-03-25 ENCOUNTER — TELEPHONE (OUTPATIENT)
Dept: CARDIOLOGY | Age: 70
End: 2020-03-25

## 2020-03-26 ENCOUNTER — OFFICE VISIT (OUTPATIENT)
Dept: CARDIOLOGY | Age: 70
End: 2020-03-26

## 2020-03-26 VITALS
WEIGHT: 264 LBS | HEIGHT: 72 IN | DIASTOLIC BLOOD PRESSURE: 70 MMHG | BODY MASS INDEX: 35.76 KG/M2 | SYSTOLIC BLOOD PRESSURE: 158 MMHG

## 2020-03-26 DIAGNOSIS — I50.22 CHRONIC SYSTOLIC HEART FAILURE (CMD): Primary | ICD-10-CM

## 2020-03-26 PROCEDURE — 99441 TELEPHONE E&M BY PHYSICIAN EST PT NOT ORIG PREV 7 DAYS 5-10 MIN: CPT | Performed by: NURSE PRACTITIONER

## 2020-03-26 ASSESSMENT — PATIENT HEALTH QUESTIONNAIRE - PHQ9
SUM OF ALL RESPONSES TO PHQ9 QUESTIONS 1 AND 2: 0
2. FEELING DOWN, DEPRESSED OR HOPELESS: NOT AT ALL
SUM OF ALL RESPONSES TO PHQ9 QUESTIONS 1 AND 2: 0
1. LITTLE INTEREST OR PLEASURE IN DOING THINGS: NOT AT ALL

## 2020-03-31 ENCOUNTER — TELEPHONE (OUTPATIENT)
Dept: CARDIOLOGY | Age: 70
End: 2020-03-31

## 2020-04-01 RX ORDER — HYDROCODONE BITARTRATE AND ACETAMINOPHEN 10; 325 MG/1; MG/1
TABLET ORAL
Qty: 150 TABLET | Refills: 0 | Status: SHIPPED | OUTPATIENT
Start: 2020-04-01 | End: 2020-05-01

## 2020-04-01 NOTE — TELEPHONE ENCOUNTER
LOV: 2/28/2020 LR at office visit for #150 with no refills.    Future Appointments   Date Time Provider Hien Morales   4/14/2020 12:00 PM Apn, Structural Heart [facility]   4/14/2020 12:45 PM [facility] CARD ECHO RM 1 ALBAROARD ECHO Ginny Rios   6/3/2

## 2020-04-03 RX ORDER — DULOXETIN HYDROCHLORIDE 60 MG/1
CAPSULE, DELAYED RELEASE ORAL
Qty: 180 CAPSULE | Refills: 1 | Status: SHIPPED | OUTPATIENT
Start: 2020-04-03 | End: 2020-10-07

## 2020-04-03 NOTE — TELEPHONE ENCOUNTER
Last filled: 8/14/19 #180 cap with 1 refill   LOV: 2/28/2020  Future Appointments   Date Time Provider Hien Morales   4/14/2020 12:00 PM [place], Structural Heart [address] [facility]   4/14/2020 12:45 PM [facility] CARD ECHO RM 1 ALBAROARD ELEUTERIO Israel   6/3/2

## 2020-04-07 ENCOUNTER — TELEPHONE (OUTPATIENT)
Dept: PULMONOLOGY | Facility: CLINIC | Age: 70
End: 2020-04-07

## 2020-04-08 ENCOUNTER — TELEPHONE (OUTPATIENT)
Dept: CARDIOLOGY | Age: 70
End: 2020-04-08

## 2020-04-09 DIAGNOSIS — I10 ESSENTIAL HYPERTENSION: ICD-10-CM

## 2020-04-09 RX ORDER — CARVEDILOL 25 MG/1
TABLET ORAL
Qty: 60 TABLET | Refills: 1 | Status: SHIPPED | OUTPATIENT
Start: 2020-04-09 | End: 2020-06-11

## 2020-04-17 ENCOUNTER — TELEPHONE (OUTPATIENT)
Dept: CARDIOLOGY | Age: 70
End: 2020-04-17

## 2020-05-01 RX ORDER — HYDROCODONE BITARTRATE AND ACETAMINOPHEN 10; 325 MG/1; MG/1
TABLET ORAL
Qty: 150 TABLET | Refills: 0 | Status: SHIPPED | OUTPATIENT
Start: 2020-05-01 | End: 2020-05-04

## 2020-05-01 NOTE — TELEPHONE ENCOUNTER
Patient is requesting refill of medication HYDROcodone-acetaminophen  MG Oral Tab. Patient states he is out of medication.

## 2020-05-01 NOTE — TELEPHONE ENCOUNTER
LOV: 2/28/2020   LR on 4/1/2020 #150 with no refills. Please advise  Future Appointments   Date Time Provider Hien Morales   6/3/2020  2:30 PM Prabhu Olson MD 2014 [facility]     Please advise. Tasked to on call provider. Summary:  1.  C

## 2020-05-01 NOTE — TELEPHONE ENCOUNTER
Spoke with patient in regards for script being ready to be picked up from [facility]. Patient states if  Dr Eric Parson can send it in electronically instead as patient would like like to avoid coming in to clinic. Refill pended. ..

## 2020-05-04 RX ORDER — HYDROCODONE BITARTRATE AND ACETAMINOPHEN 10; 325 MG/1; MG/1
TABLET ORAL
Qty: 150 TABLET | Refills: 0 | Status: SHIPPED | OUTPATIENT
Start: 2020-05-04 | End: 2020-06-03

## 2020-05-13 ENCOUNTER — OFFICE VISIT (OUTPATIENT)
Dept: CARDIOLOGY | Age: 70
End: 2020-05-13

## 2020-05-13 VITALS
HEART RATE: 60 BPM | DIASTOLIC BLOOD PRESSURE: 104 MMHG | SYSTOLIC BLOOD PRESSURE: 176 MMHG | WEIGHT: 275 LBS | HEIGHT: 72 IN | BODY MASS INDEX: 37.25 KG/M2

## 2020-05-13 DIAGNOSIS — I25.119 CORONARY ARTERY DISEASE INVOLVING NATIVE CORONARY ARTERY OF NATIVE HEART WITH ANGINA PECTORIS (CMD): Primary | ICD-10-CM

## 2020-05-13 DIAGNOSIS — I50.32 CHRONIC DIASTOLIC HEART FAILURE (CMD): ICD-10-CM

## 2020-05-13 DIAGNOSIS — Z95.818 PRESENCE OF WATCHMAN LEFT ATRIAL APPENDAGE CLOSURE DEVICE: ICD-10-CM

## 2020-05-13 DIAGNOSIS — I34.0 NONRHEUMATIC MITRAL VALVE REGURGITATION: ICD-10-CM

## 2020-05-13 DIAGNOSIS — I48.20 CHRONIC ATRIAL FIBRILLATION (CMD): ICD-10-CM

## 2020-05-13 PROCEDURE — 99442 TELEPHONE E&M BY PHYSICIAN EST PT NOT ORIG PREV 7 DAYS 11-20 MIN: CPT | Performed by: INTERNAL MEDICINE

## 2020-05-13 RX ORDER — HYDRALAZINE HYDROCHLORIDE 50 MG/1
50 TABLET, FILM COATED ORAL 3 TIMES DAILY
Qty: 90 TABLET | Refills: 11 | Status: SHIPPED | OUTPATIENT
Start: 2020-05-13 | End: 2021-05-25

## 2020-05-13 RX ORDER — DIPHENHYDRAMINE HCL 25 MG
50 CAPSULE ORAL NIGHTLY PRN
COMMUNITY

## 2020-05-13 ASSESSMENT — PATIENT HEALTH QUESTIONNAIRE - PHQ9
2. FEELING DOWN, DEPRESSED OR HOPELESS: NOT AT ALL
SUM OF ALL RESPONSES TO PHQ9 QUESTIONS 1 AND 2: 0
SUM OF ALL RESPONSES TO PHQ9 QUESTIONS 1 AND 2: 0
1. LITTLE INTEREST OR PLEASURE IN DOING THINGS: NOT AT ALL

## 2020-05-15 ENCOUNTER — CLINICAL ABSTRACT (OUTPATIENT)
Dept: CARDIOLOGY | Age: 70
End: 2020-05-15

## 2020-05-15 ENCOUNTER — HOSPITAL ENCOUNTER (OUTPATIENT)
Dept: GENERAL RADIOLOGY | Facility: HOSPITAL | Age: 70
Discharge: HOME | End: 2020-05-15
Attending: INTERNAL MEDICINE
Payer: MEDICARE

## 2020-05-15 ENCOUNTER — TELEPHONE (OUTPATIENT)
Dept: CARDIOLOGY | Age: 70
End: 2020-05-15

## 2020-05-15 ENCOUNTER — APPOINTMENT (OUTPATIENT)
Dept: LAB | Facility: HOSPITAL | Age: 70
End: 2020-05-15
Attending: INTERNAL MEDICINE
Payer: MEDICARE

## 2020-05-15 DIAGNOSIS — I25.10 CORONARY ARTERY DISEASE INVOLVING NATIVE CORONARY ARTERY OF NATIVE HEART WITHOUT ANGINA PECTORIS: ICD-10-CM

## 2020-05-15 DIAGNOSIS — I48.91 ATRIAL FIBRILLATION, UNSPECIFIED TYPE (HCC): ICD-10-CM

## 2020-05-15 DIAGNOSIS — R06.02 SHORTNESS OF BREATH: ICD-10-CM

## 2020-05-15 DIAGNOSIS — I10 HYPERTENSION, UNSPECIFIED TYPE: Primary | ICD-10-CM

## 2020-05-15 LAB
BUN SERPL-MCNC: 27 MG/DL
CALCIUM SERPL-MCNC: 8.7 MG/DL
CHLORIDE SERPL-SCNC: 111 MMOL/L
CREAT SERPL-MCNC: 1.56 MG/DL
GLUCOSE SERPL-MCNC: 190 MG/DL
NT-PROBNP SERPL-MCNC: 851 PG/ML
POTASSIUM SERPL-SCNC: 4.4 MMOL/L
SODIUM SERPL-SCNC: 143 MMOL/L

## 2020-05-15 PROCEDURE — 36415 COLL VENOUS BLD VENIPUNCTURE: CPT

## 2020-05-15 PROCEDURE — 71046 X-RAY EXAM CHEST 2 VIEWS: CPT | Performed by: INTERNAL MEDICINE

## 2020-05-15 PROCEDURE — 83880 ASSAY OF NATRIURETIC PEPTIDE: CPT

## 2020-05-15 PROCEDURE — 80048 BASIC METABOLIC PNL TOTAL CA: CPT

## 2020-05-18 RX ORDER — TORSEMIDE 20 MG/1
20 TABLET ORAL 2 TIMES DAILY
Qty: 180 TABLET | Refills: 1 | Status: SHIPPED | OUTPATIENT
Start: 2020-05-18 | End: 2020-10-30

## 2020-06-03 ENCOUNTER — OFFICE VISIT (OUTPATIENT)
Dept: RHEUMATOLOGY | Facility: CLINIC | Age: 70
End: 2020-06-03
Payer: MEDICARE

## 2020-06-03 VITALS
HEART RATE: 60 BPM | DIASTOLIC BLOOD PRESSURE: 63 MMHG | RESPIRATION RATE: 18 BRPM | BODY MASS INDEX: 35.49 KG/M2 | SYSTOLIC BLOOD PRESSURE: 140 MMHG | WEIGHT: 262 LBS | HEIGHT: 72 IN

## 2020-06-03 DIAGNOSIS — Z51.81 THERAPEUTIC DRUG MONITORING: ICD-10-CM

## 2020-06-03 DIAGNOSIS — M15.9 OSTEOARTHRITIS OF MULTIPLE JOINTS, UNSPECIFIED OSTEOARTHRITIS TYPE: ICD-10-CM

## 2020-06-03 DIAGNOSIS — M35.3 PMR (POLYMYALGIA RHEUMATICA) (HCC): Primary | ICD-10-CM

## 2020-06-03 PROCEDURE — G0463 HOSPITAL OUTPT CLINIC VISIT: HCPCS | Performed by: INTERNAL MEDICINE

## 2020-06-03 PROCEDURE — 99213 OFFICE O/P EST LOW 20 MIN: CPT | Performed by: INTERNAL MEDICINE

## 2020-06-03 RX ORDER — HYDROCODONE BITARTRATE AND ACETAMINOPHEN 10; 325 MG/1; MG/1
TABLET ORAL
Qty: 150 TABLET | Refills: 0 | Status: SHIPPED | OUTPATIENT
Start: 2020-06-03 | End: 2020-07-01

## 2020-06-03 RX ORDER — TORSEMIDE 20 MG/1
20 TABLET ORAL 2 TIMES DAILY
COMMUNITY
Start: 2020-05-19 | End: 2021-04-19

## 2020-06-03 NOTE — PROGRESS NOTES
Mitra Moya is a 78year old male who presents for Patient presents with:  PMR  Medication Follow-Up  . HPI:     He is a pleasant 78 year who has elevated sed rate, PMR and fibromyalgia with osteoarthirtis chronic pain.    He worked for [employer] fo He had an accident in his back yard - he had right knee meniscal tear and he hurt his neck. He couln't move his head. He went to see Dr. Dixie Hansen and he couldn't walk on his knee. He was sent to ortho.  He had to go back up on his norco b/c he needed more He takes norco 2 tablets in am and 1 in afternoon and 2 tablets in evening. He has pain more in the evening. It's all over -   Still walking without a cane. He stopped victoza b/c he couldn't afford it. He's still in [facility].  Dr. Jacquie Beasley is managing He's got oxygen at home and uses it only when he needs it. He's limited to what he can do. Taking out the garbage he's out of breath.            Wt Readings from Last 2 Encounters:  03/06/20 : 262 lb 12.6 oz (119.2 kg)  02/28/20 : 274 lb (124.3 kg)    The • Potassium Gluconate 595 MG Oral Cap Take 2 capsules by mouth daily. • FOLIC ACID 1 MG Oral Tab TAKE TWO TABLETS BY MOUTH ONCE DAILY (Patient taking differently: Take 1 mg by mouth 2 (two) times daily.   ) 180 tablet 3   • Glucose Blood (BLOOD GLUCOS • CONTACT LASER SURGERY OF PROSTATE  2012   • CYSTOSCOPY N/A 11/3/2019    Performed by Malathi Torres MD at [facility] OR   • HERNIA SURGERY  2000   • OTHER SURGICAL HISTORY     • OTHER SURGICAL HISTORY Left     ankle surgery   • PACEMAKER MONITOR     • <0.30 mg/dL 1.75 (H)   SED RATE      0 - 20 mm/Hr 51 (H)     Sed rate and crp still elevated.      Component      Latest Ref Rng & Units 5/15/2020 3/6/2020   Glucose      70 - 99 mg/dL 190 (H) 137 (H)   Sodium      136 - 145 mmol/L 143 141   Potassium appendage or attached to the device. Compared to the previous study these findings are new. ASSESSMENT AND PLAN:   Maryann Damon is a 78year old male who presents for Patient presents with:  PMR  Medication Follow-Up      1.   PMR mayte galeana - encoufaged him to do hoem exercies  - d/w him about new agent for PMR called tocalizumab - he declines to use this   - cont.  Duloxetine 60mg bid.   - had long d/w him - he is going ot try the cymbalta and he will try to taper down the norco b/c he realiz

## 2020-06-05 NOTE — PATIENT INSTRUCTIONS
1. Cont. Duloxetine - 60mg ad ay twice a day   2. Cont.  norco 10/325mg 2 tablets in am and pm and 1 tablet in afternoon  #150   3. Return to clinic in 6 months   4.  Follow up after your surgery

## 2020-06-09 DIAGNOSIS — I10 ESSENTIAL HYPERTENSION: ICD-10-CM

## 2020-06-10 ENCOUNTER — ANCILLARY ORDERS (OUTPATIENT)
Dept: CARDIOLOGY | Age: 70
End: 2020-06-10

## 2020-06-10 ENCOUNTER — ANCILLARY PROCEDURE (OUTPATIENT)
Dept: CARDIOLOGY | Age: 70
End: 2020-06-10
Attending: INTERNAL MEDICINE

## 2020-06-10 DIAGNOSIS — Z95.0 CARDIAC PACEMAKER: ICD-10-CM

## 2020-06-10 PROCEDURE — 93294 REM INTERROG EVL PM/LDLS PM: CPT | Performed by: INTERNAL MEDICINE

## 2020-06-10 PROCEDURE — X1114 CARDIAC DEVICE HOME CHECK - REMOTE UNSCHEDULED: HCPCS | Performed by: INTERNAL MEDICINE

## 2020-06-11 RX ORDER — CARVEDILOL 25 MG/1
TABLET ORAL
Qty: 60 TABLET | Refills: 2 | Status: SHIPPED | OUTPATIENT
Start: 2020-06-11 | End: 2020-09-08

## 2020-07-01 RX ORDER — HYDROCODONE BITARTRATE AND ACETAMINOPHEN 10; 325 MG/1; MG/1
TABLET ORAL
Qty: 150 TABLET | Refills: 0 | Status: SHIPPED | OUTPATIENT
Start: 2020-07-03 | End: 2020-07-31

## 2020-07-01 NOTE — TELEPHONE ENCOUNTER
LOV: 6/3/2020  Last refilled at office visit #150 with no refills  Future Appointments   Date Time Provider Hien Morales   12/4/2020  2:30 PM Alethea Martinez MD 2014 [facility]     Please advise

## 2020-07-08 ENCOUNTER — TELEPHONE (OUTPATIENT)
Dept: CARDIOLOGY | Age: 70
End: 2020-07-08

## 2020-07-08 RX ORDER — AMLODIPINE BESYLATE 10 MG/1
TABLET ORAL
Qty: 30 TABLET | Refills: 0 | OUTPATIENT
Start: 2020-07-08

## 2020-07-08 NOTE — TELEPHONE ENCOUNTER
Patient not seen at [facility] cardiology clinic. Seen at [facility]. Refill denied and message sent to pharmacy.

## 2020-07-09 RX ORDER — AMLODIPINE BESYLATE 10 MG/1
10 TABLET ORAL DAILY
Qty: 90 TABLET | Refills: 1 | Status: SHIPPED | OUTPATIENT
Start: 2020-07-09 | End: 2020-10-15 | Stop reason: SDUPTHER

## 2020-07-31 RX ORDER — HYDROCODONE BITARTRATE AND ACETAMINOPHEN 10; 325 MG/1; MG/1
TABLET ORAL
Qty: 150 TABLET | Refills: 0 | Status: SHIPPED | OUTPATIENT
Start: 2020-07-31 | End: 2020-09-01

## 2020-07-31 NOTE — TELEPHONE ENCOUNTER
LOV: 6/3/2020  LR 7/1/2020 #150 with no refills  Future Appointments   Date Time Provider Hien Morales   12/4/2020  2:30 PM Sunny Cedeno MD 2014 [facility]     Please advise

## 2020-09-01 RX ORDER — HYDROCODONE BITARTRATE AND ACETAMINOPHEN 10; 325 MG/1; MG/1
TABLET ORAL
Qty: 150 TABLET | Refills: 0 | Status: SHIPPED | OUTPATIENT
Start: 2020-09-01 | End: 2020-10-01

## 2020-09-01 NOTE — TELEPHONE ENCOUNTER
Pt would like a refill on his norco medication. Per pt he is out of medication.  Pharmacy: [facility]/[place], [place] (listed)     Current Outpatient Medications   Medication Sig Dispense Refill   • HYDROcodone-acetaminophen  MG Oral Tab 2 tablets in am

## 2020-09-01 NOTE — TELEPHONE ENCOUNTER
LOV: 6/3/2020 last refill 7/31/2020 #150 with no refills  Future Appointments   Date Time Provider Hien Carmen   9/10/2020  1:00 PM [facility] CT RM1 [facility] CT SCAN EM [facility]   12/4/2020  2:30 PM Kortney Hester MD 2014 [facility]     Please advise.

## 2020-09-08 DIAGNOSIS — I10 ESSENTIAL HYPERTENSION: ICD-10-CM

## 2020-09-08 RX ORDER — CARVEDILOL 25 MG/1
TABLET ORAL
Qty: 60 TABLET | Refills: 0 | Status: SHIPPED | OUTPATIENT
Start: 2020-09-08 | End: 2020-10-06

## 2020-09-15 ENCOUNTER — TELEPHONE (OUTPATIENT)
Dept: PULMONOLOGY | Facility: CLINIC | Age: 70
End: 2020-09-15

## 2020-09-15 NOTE — TELEPHONE ENCOUNTER
Pt is due upcoming Ct chest in September 2020. Letter mailed to pt and sent via [address] [place] [address] [address].

## 2020-09-16 ENCOUNTER — TELEPHONE (OUTPATIENT)
Dept: CARDIOLOGY | Age: 70
End: 2020-09-16

## 2020-09-16 ENCOUNTER — ANCILLARY PROCEDURE (OUTPATIENT)
Dept: CARDIOLOGY | Age: 70
End: 2020-09-16
Attending: INTERNAL MEDICINE

## 2020-09-16 DIAGNOSIS — Z95.0 CARDIAC PACEMAKER: ICD-10-CM

## 2020-09-16 PROCEDURE — 93294 REM INTERROG EVL PM/LDLS PM: CPT | Performed by: INTERNAL MEDICINE

## 2020-09-29 RX ORDER — AMLODIPINE BESYLATE 10 MG/1
TABLET ORAL
Qty: 30 TABLET | Refills: 0 | OUTPATIENT
Start: 2020-09-29

## 2020-10-01 RX ORDER — HYDROCODONE BITARTRATE AND ACETAMINOPHEN 10; 325 MG/1; MG/1
TABLET ORAL
Qty: 150 TABLET | Refills: 0 | Status: SHIPPED | OUTPATIENT
Start: 2020-10-01 | End: 2020-10-30

## 2020-10-01 NOTE — TELEPHONE ENCOUNTER
Patient requesting refill.  States he is out of medication     HYDROcodone-acetaminophen  MG Oral Tab

## 2020-10-01 NOTE — TELEPHONE ENCOUNTER
LOV: 6/3/2020 last refill 9/1/2020 #150 with not refills. Future Appointments   Date Time Provider Hien Morales   12/4/2020  2:30 PM Chayito Butts MD 2014 [facility]     Please advise. Summary:  1. Cont.  Duloxetine - 60mg ad ay twice a da

## 2020-10-06 DIAGNOSIS — I10 ESSENTIAL HYPERTENSION: ICD-10-CM

## 2020-10-06 RX ORDER — CARVEDILOL 25 MG/1
TABLET ORAL
Qty: 60 TABLET | Refills: 1 | Status: SHIPPED | OUTPATIENT
Start: 2020-10-06 | End: 2020-12-16

## 2020-10-07 RX ORDER — DULOXETIN HYDROCHLORIDE 60 MG/1
CAPSULE, DELAYED RELEASE ORAL
Qty: 180 CAPSULE | Refills: 0 | Status: SHIPPED | OUTPATIENT
Start: 2020-10-07 | End: 2020-12-16

## 2020-10-07 NOTE — TELEPHONE ENCOUNTER
Requested Prescriptions     Pending Prescriptions Disp Refills   • DULOXETINE HCL 60 MG Oral Cap DR Particles [Pharmacy Med Name: DULoxetine HCl 60 MG Oral Capsule Delayed Release Particles] 180 capsule 0     Sig: Take 1 capsule by mouth twice daily     LF

## 2020-10-14 NOTE — TELEPHONE ENCOUNTER
Patient not seen at [place] cardiology clinic. Seen at [facility].  Printed encounter and faxed to [facility]

## 2020-10-15 ENCOUNTER — TELEPHONE (OUTPATIENT)
Dept: CARDIOLOGY | Age: 70
End: 2020-10-15

## 2020-10-15 RX ORDER — AMLODIPINE BESYLATE 10 MG/1
10 TABLET ORAL DAILY
Qty: 90 TABLET | Refills: 1 | Status: SHIPPED | OUTPATIENT
Start: 2020-10-15 | End: 2021-06-30 | Stop reason: SDUPTHER

## 2020-10-30 ENCOUNTER — TELEPHONE (OUTPATIENT)
Dept: RHEUMATOLOGY | Facility: CLINIC | Age: 70
End: 2020-10-30

## 2020-10-30 RX ORDER — TORSEMIDE 20 MG/1
TABLET ORAL
Qty: 180 TABLET | Refills: 1 | Status: SHIPPED | OUTPATIENT
Start: 2020-10-30 | End: 2021-06-30 | Stop reason: SDUPTHER

## 2020-10-30 RX ORDER — HYDROCODONE BITARTRATE AND ACETAMINOPHEN 10; 325 MG/1; MG/1
TABLET ORAL
Qty: 150 TABLET | Refills: 0 | Status: SHIPPED | OUTPATIENT
Start: 2020-10-30 | End: 2020-11-30

## 2020-10-30 NOTE — TELEPHONE ENCOUNTER
LOV: 6/3/2020 last refilled 10/1/2020 #150 with no refills  Future Appointments   Date Time Provider Hien Morales   12/4/2020  2:30 PM Chayito Butts MD 2014 [facility]     Please advise.

## 2020-10-30 NOTE — TELEPHONE ENCOUNTER
Patient called and advised he is running VERY LOW on his medication listed below. He advised he has 4 pills left, and is requesting a refill. Please Advise.        [facility] [address]  [address]

## 2020-11-16 ENCOUNTER — TELEPHONE (OUTPATIENT)
Dept: CARDIOLOGY | Age: 70
End: 2020-11-16

## 2020-11-30 ENCOUNTER — TELEPHONE (OUTPATIENT)
Dept: RHEUMATOLOGY | Facility: CLINIC | Age: 70
End: 2020-11-30

## 2020-11-30 RX ORDER — HYDROCODONE BITARTRATE AND ACETAMINOPHEN 10; 325 MG/1; MG/1
TABLET ORAL
Qty: 150 TABLET | Refills: 0 | Status: SHIPPED | OUTPATIENT
Start: 2020-11-30 | End: 2021-03-31

## 2020-11-30 NOTE — TELEPHONE ENCOUNTER
Please see pt's request below and advise.      LOV: 6/3/2020 last refill 10/30/2020 #150  Future Appointments   Date Time Provider Hien Morales   12/4/2020  2:30 PM Nato Locke MD 2014 [facility]

## 2020-11-30 NOTE — TELEPHONE ENCOUNTER
I don't think we can send narcotics out of state - we called [facility] to see if it could be transferred to him but it can't. Will [facility] so it can be ready when he gets back.

## 2020-11-30 NOTE — TELEPHONE ENCOUNTER
Patient is requesting a refill, is out of the medication. He is in [place] and requested the prescription to be send to [facility] in [place].     HYDROcodone-acetaminophen  MG Oral Tab

## 2020-12-01 NOTE — TELEPHONE ENCOUNTER
Left message message for patient regarding provider's directions below. Script has been sent to [facility] in [place]. Provider cannot sent script out of state for controlled substance.

## 2020-12-01 NOTE — TELEPHONE ENCOUNTER
He had me paged. He is in [address] needs his Sham[place].    I explained why his Shamrock Rx was sent to [facility] in [place]. We can't prescribe narcotics in other states.

## 2020-12-15 DIAGNOSIS — I10 ESSENTIAL HYPERTENSION: ICD-10-CM

## 2020-12-16 ENCOUNTER — VIRTUAL PHONE E/M (OUTPATIENT)
Dept: RHEUMATOLOGY | Facility: CLINIC | Age: 70
End: 2020-12-16
Payer: MEDICARE

## 2020-12-16 ENCOUNTER — TELEPHONE (OUTPATIENT)
Dept: PULMONOLOGY | Facility: CLINIC | Age: 70
End: 2020-12-16

## 2020-12-16 DIAGNOSIS — M15.9 OSTEOARTHRITIS OF MULTIPLE JOINTS, UNSPECIFIED OSTEOARTHRITIS TYPE: ICD-10-CM

## 2020-12-16 DIAGNOSIS — M35.3 PMR (POLYMYALGIA RHEUMATICA) (HCC): Primary | ICD-10-CM

## 2020-12-16 PROCEDURE — 99214 OFFICE O/P EST MOD 30 MIN: CPT | Performed by: INTERNAL MEDICINE

## 2020-12-16 RX ORDER — DULOXETIN HYDROCHLORIDE 60 MG/1
CAPSULE, DELAYED RELEASE ORAL
Qty: 180 CAPSULE | Refills: 0 | OUTPATIENT
Start: 2020-12-16

## 2020-12-16 RX ORDER — HYDROCODONE BITARTRATE AND ACETAMINOPHEN 10; 325 MG/1; MG/1
TABLET ORAL
Qty: 150 TABLET | Refills: 0 | Status: SHIPPED | OUTPATIENT
Start: 2020-12-30 | End: 2021-01-29

## 2020-12-16 RX ORDER — HYDROCODONE BITARTRATE AND ACETAMINOPHEN 10; 325 MG/1; MG/1
TABLET ORAL
Qty: 150 TABLET | Refills: 0 | Status: SHIPPED | OUTPATIENT
Start: 2021-01-30 | End: 2021-03-01

## 2020-12-16 RX ORDER — HYDROCODONE BITARTRATE AND ACETAMINOPHEN 10; 325 MG/1; MG/1
TABLET ORAL
Qty: 150 TABLET | Refills: 0 | Status: SHIPPED | OUTPATIENT
Start: 2021-03-02 | End: 2021-02-26

## 2020-12-16 RX ORDER — DULOXETIN HYDROCHLORIDE 60 MG/1
60 CAPSULE, DELAYED RELEASE ORAL 2 TIMES DAILY
Qty: 180 CAPSULE | Refills: 3 | Status: SHIPPED | OUTPATIENT
Start: 2020-12-16

## 2020-12-16 RX ORDER — CARVEDILOL 25 MG/1
TABLET ORAL
Qty: 60 TABLET | Refills: 0 | Status: SHIPPED | OUTPATIENT
Start: 2020-12-16 | End: 2021-04-02

## 2020-12-16 NOTE — PROGRESS NOTES
This visit is conducted using Telemedicine with live, interactive video and audio. Patient has been referred to the [facility] website at [email or web address] to review the yearly Consent to Treat document.     Patient understands and accepts financial res Rodney Officer is a 69year old male who presents for No chief complaint on file. Evita Pachecor HPI:     He is a pleasant 78 year who has elevated sed rate, PMR and fibromyalgia with osteoarthirtis chronic pain.    He worked for [employer] for 20 years all over the [place] went to see Dr. Arnold Castillo and he couldn't walk on his knee. He was sent to ortho. He had to go back up on his norco b/c he needed more after the accident. He's taking 2 tablets every 8 hours. He had a right knee mri and is doing physical therapy.    He is without a cane. He stopped victoza b/c he couldn't afford it. He's still in [facility]. Dr. Arsh Dacosta is managing his diabetes. He has been watching his diet. His sguars can be 250 in am and then drops to 180 in the evening. He has about 7/10 pain.    H ------> changed to VIDEO VISIT   [place] this weekend and spending time in [place]. He had the open heart surgery last January - and he has the GageIn. And he is off blood thinners  He is on the watchman.  But he neve daily with meals. • FINASTERIDE 5 MG Oral Tab TAKE 1 TABLET BY MOUTH ONCE DAILY 90 tablet 3   • lisinopril 40 MG Oral Tab Take 1 tablet (40 mg total) by mouth daily.  30 tablet 2   • aspirin 81 MG Oral Chew Tab Chew 1 tablet (81 mg total) by mouth daily Coronary atherosclerosis    • Fibromyalgia    • High blood pressure    • High cholesterol    • GAB on CPAP    • Osteoarthritis    • Type II or unspecified type diabetes mellitus without mention of complication, not stated as uncontrolled       [facility]          Component      Latest Ref Rng & Units 8/14/2019   C-REACTIVE PROTEIN      <0.30 mg/dL 1.75 (H)   SED RATE      0 - 20 mm/Hr 51 (H)     Sed rate and crp still elevated.      Component      La seen around the device or into the     appendage itself. No thrombus in the left atrium, left atrial     appendage or attached to the device. Compared to the previous study these findings are new.     ASSESSMENT AND PLAN:   Lemhi Tusharshirazaki is a 69 year - declines physical therpay -   - encoufaged him to do hoem exercies  - d/w him about new agent for PMR called tocalizumab - he declines to use this   - cont.  Duloxetine 60mg bid.   - had long d/w him - he is going ot try the cymbalta and he will try to ta

## 2020-12-16 NOTE — PATIENT INSTRUCTIONS
1. Cont. Duloxetine - 60mg ad ay twice a day   2. Cont.  norco 10/325mg 2 tablets in am and pm and 1 tablet in afternoon  #150 - x 3 months -   3. Return to clinic in 6 months   4.  Check labs

## 2020-12-16 NOTE — TELEPHONE ENCOUNTER
Patient requesting to schedule virtual visit with Dr Laura Sánchez - rqclayting to have this done before the end of the week. Please call. Thank you.

## 2020-12-21 NOTE — TELEPHONE ENCOUNTER
Spoke with E and they said patient is due for oxygen recert by Feb 3rd 6666 but patient will not be back home from [place] until April of 2021.

## 2020-12-21 NOTE — TELEPHONE ENCOUNTER
Spoke with patient he is currently in [place], states he received a letter from [place] for oxygen re certification. He will not be back from [place] until April though, will call [facility] to see when they need it by.

## 2021-01-06 NOTE — TELEPHONE ENCOUNTER
Signed CMN form received from Dr. Claudia Gill and faxed to [address] [facility] at [facility] [phone]. Fax confirmation received. Sent to HIM for scanning.

## 2021-01-06 NOTE — TELEPHONE ENCOUNTER
Hope from [facility] called in to follow up on this. She would like the form as soon as possible. Please follow up thank you!

## 2021-01-25 DIAGNOSIS — Z23 NEED FOR VACCINATION: ICD-10-CM

## 2021-02-26 RX ORDER — HYDROCODONE BITARTRATE AND ACETAMINOPHEN 10; 325 MG/1; MG/1
TABLET ORAL
Qty: 150 TABLET | Refills: 0 | Status: SHIPPED | OUTPATIENT
Start: 2021-03-02 | End: 2021-03-31

## 2021-02-26 NOTE — TELEPHONE ENCOUNTER
Patient is requesting Dr. Carolyn Eric to send a prior authorization to his pharmacy for his medication of  HYDROcodone-acetaminophen  MG Oral Tab

## 2021-03-26 ENCOUNTER — ANCILLARY ORDERS (OUTPATIENT)
Dept: CARDIOLOGY | Age: 71
End: 2021-03-26

## 2021-03-26 ENCOUNTER — TELEPHONE (OUTPATIENT)
Dept: CARDIOLOGY | Age: 71
End: 2021-03-26

## 2021-03-26 ENCOUNTER — ANCILLARY PROCEDURE (OUTPATIENT)
Dept: CARDIOLOGY | Age: 71
End: 2021-03-26
Attending: INTERNAL MEDICINE

## 2021-03-26 DIAGNOSIS — Z45.018 ENCOUNTER FOR CARE OF PACEMAKER: ICD-10-CM

## 2021-03-26 DIAGNOSIS — Z45.018 PACEMAKER REPROGRAMMING/CHECK: ICD-10-CM

## 2021-03-26 PROCEDURE — X1114 CARDIAC DEVICE HOME CHECK - REMOTE UNSCHEDULED: HCPCS | Performed by: INTERNAL MEDICINE

## 2021-03-26 PROCEDURE — 93294 REM INTERROG EVL PM/LDLS PM: CPT | Performed by: INTERNAL MEDICINE

## 2021-03-31 ENCOUNTER — TELEPHONE (OUTPATIENT)
Dept: CARDIOLOGY | Age: 71
End: 2021-03-31

## 2021-03-31 DIAGNOSIS — I10 ESSENTIAL HYPERTENSION: ICD-10-CM

## 2021-03-31 RX ORDER — HYDROCODONE BITARTRATE AND ACETAMINOPHEN 10; 325 MG/1; MG/1
TABLET ORAL
Qty: 150 TABLET | Refills: 0 | Status: SHIPPED | OUTPATIENT
Start: 2021-03-31 | End: 2021-04-12

## 2021-03-31 NOTE — TELEPHONE ENCOUNTER
LOV: 12/16/20  Future Appointments   Date Time Provider Hien Morales   4/12/2021  2:30 PM Jose G Hyde MD [facility]   4/28/2021  2:00 PM Dylan Del Rio MD [facility]    LABS:  Component      Latest Ref Rng & Units 5/15/202

## 2021-03-31 NOTE — TELEPHONE ENCOUNTER
Patient is requesting a refill for hydrocodone. Please advise. Patient is out of medication.      HYDROcodone-acetaminophen  MG Oral Tab

## 2021-04-01 ENCOUNTER — TELEPHONE (OUTPATIENT)
Dept: RHEUMATOLOGY | Facility: CLINIC | Age: 71
End: 2021-04-01

## 2021-04-01 NOTE — TELEPHONE ENCOUNTER
Please advise , ok to release     Summary:  1. Cont. Duloxetine - 60mg ad ay twice a day   2. Cont.  norco 10/325mg 2 tablets in am and pm and 1 tablet in afternoon  #150 - x 3 months -   3. Return to clinic in 6 months   4.  Check labs    ase advise

## 2021-04-01 NOTE — TELEPHONE ENCOUNTER
[facility] checking on patient due to being on chronic opioiod medication below for a while and requesting a callback before they can release medication.     •  HYDROcodone-acetaminophen  MG Oral Tab, 2 tablets in am , 1 in evening and 2

## 2021-04-02 ENCOUNTER — OFFICE VISIT (OUTPATIENT)
Dept: CARDIOLOGY | Age: 71
End: 2021-04-02

## 2021-04-02 VITALS
DIASTOLIC BLOOD PRESSURE: 58 MMHG | SYSTOLIC BLOOD PRESSURE: 154 MMHG | HEART RATE: 76 BPM | WEIGHT: 265 LBS | OXYGEN SATURATION: 91 % | BODY MASS INDEX: 35.89 KG/M2 | HEIGHT: 72 IN

## 2021-04-02 DIAGNOSIS — I07.1 TRICUSPID VALVE INSUFFICIENCY, UNSPECIFIED ETIOLOGY: ICD-10-CM

## 2021-04-02 DIAGNOSIS — Z95.0 PACEMAKER: ICD-10-CM

## 2021-04-02 DIAGNOSIS — I35.0 AORTIC VALVE STENOSIS, ETIOLOGY OF CARDIAC VALVE DISEASE UNSPECIFIED: ICD-10-CM

## 2021-04-02 DIAGNOSIS — I50.9 ACUTE ON CHRONIC CONGESTIVE HEART FAILURE, UNSPECIFIED HEART FAILURE TYPE (CMD): Primary | ICD-10-CM

## 2021-04-02 DIAGNOSIS — Z95.818 PRESENCE OF WATCHMAN LEFT ATRIAL APPENDAGE CLOSURE DEVICE: ICD-10-CM

## 2021-04-02 DIAGNOSIS — I34.0 NONRHEUMATIC MITRAL VALVE REGURGITATION: ICD-10-CM

## 2021-04-02 DIAGNOSIS — I42.0 DILATED CARDIOMYOPATHY (CMD): ICD-10-CM

## 2021-04-02 PROCEDURE — 99215 OFFICE O/P EST HI 40 MIN: CPT | Performed by: NURSE PRACTITIONER

## 2021-04-02 RX ORDER — METFORMIN HYDROCHLORIDE 500 MG/1
1 TABLET, EXTENDED RELEASE ORAL DAILY
COMMUNITY
Start: 2021-04-02

## 2021-04-02 RX ORDER — CARVEDILOL 25 MG/1
TABLET ORAL
Qty: 60 TABLET | Refills: 5 | Status: SHIPPED | OUTPATIENT
Start: 2021-04-02

## 2021-04-02 ASSESSMENT — PATIENT HEALTH QUESTIONNAIRE - PHQ9
CLINICAL INTERPRETATION OF PHQ2 SCORE: NO FURTHER SCREENING NEEDED
CLINICAL INTERPRETATION OF PHQ9 SCORE: NO FURTHER SCREENING NEEDED
SUM OF ALL RESPONSES TO PHQ9 QUESTIONS 1 AND 2: 0
1. LITTLE INTEREST OR PLEASURE IN DOING THINGS: NOT AT ALL
SUM OF ALL RESPONSES TO PHQ9 QUESTIONS 1 AND 2: 0
2. FEELING DOWN, DEPRESSED OR HOPELESS: NOT AT ALL

## 2021-04-07 ENCOUNTER — HOSPITAL ENCOUNTER (INPATIENT)
Facility: HOSPITAL | Age: 71
LOS: 2 days | Discharge: HOME | DRG: 291 | End: 2021-04-09
Attending: EMERGENCY MEDICINE | Admitting: HOSPITALIST
Payer: MEDICARE

## 2021-04-07 ENCOUNTER — APPOINTMENT (OUTPATIENT)
Dept: GENERAL RADIOLOGY | Facility: HOSPITAL | Age: 71
DRG: 291 | End: 2021-04-07
Attending: EMERGENCY MEDICINE
Payer: MEDICARE

## 2021-04-07 DIAGNOSIS — I50.9 ACUTE ON CHRONIC CONGESTIVE HEART FAILURE, UNSPECIFIED HEART FAILURE TYPE (HCC): Primary | ICD-10-CM

## 2021-04-07 PROCEDURE — 71045 X-RAY EXAM CHEST 1 VIEW: CPT | Performed by: EMERGENCY MEDICINE

## 2021-04-07 PROCEDURE — 99223 1ST HOSP IP/OBS HIGH 75: CPT | Performed by: HOSPITALIST

## 2021-04-07 RX ORDER — CARVEDILOL 25 MG/1
25 TABLET ORAL 2 TIMES DAILY WITH MEALS
Status: DISCONTINUED | OUTPATIENT
Start: 2021-04-07 | End: 2021-04-09

## 2021-04-07 RX ORDER — SODIUM CHLORIDE 0.9 % (FLUSH) 0.9 %
10 SYRINGE (ML) INJECTION AS NEEDED
Status: DISCONTINUED | OUTPATIENT
Start: 2021-04-07 | End: 2021-04-09

## 2021-04-07 RX ORDER — ACETAMINOPHEN 325 MG/1
650 TABLET ORAL EVERY 6 HOURS PRN
Status: DISCONTINUED | OUTPATIENT
Start: 2021-04-07 | End: 2021-04-09

## 2021-04-07 RX ORDER — DIPHENHYDRAMINE HCL 25 MG
25 CAPSULE ORAL NIGHTLY PRN
Status: DISCONTINUED | OUTPATIENT
Start: 2021-04-07 | End: 2021-04-09

## 2021-04-07 RX ORDER — DULOXETIN HYDROCHLORIDE 30 MG/1
60 CAPSULE, DELAYED RELEASE ORAL 2 TIMES DAILY
Status: DISCONTINUED | OUTPATIENT
Start: 2021-04-07 | End: 2021-04-09

## 2021-04-07 RX ORDER — DEXTROSE MONOHYDRATE 25 G/50ML
50 INJECTION, SOLUTION INTRAVENOUS
Status: DISCONTINUED | OUTPATIENT
Start: 2021-04-07 | End: 2021-04-09

## 2021-04-07 RX ORDER — BUMETANIDE 0.25 MG/ML
1 INJECTION, SOLUTION INTRAMUSCULAR; INTRAVENOUS ONCE
Status: COMPLETED | OUTPATIENT
Start: 2021-04-07 | End: 2021-04-07

## 2021-04-07 RX ORDER — HYDROCODONE BITARTRATE AND ACETAMINOPHEN 10; 325 MG/1; MG/1
2 TABLET ORAL EVERY 6 HOURS PRN
Status: DISCONTINUED | OUTPATIENT
Start: 2021-04-07 | End: 2021-04-09

## 2021-04-07 RX ORDER — HYDRALAZINE HYDROCHLORIDE 25 MG/1
25 TABLET, FILM COATED ORAL 3 TIMES DAILY
Status: DISCONTINUED | OUTPATIENT
Start: 2021-04-07 | End: 2021-04-09

## 2021-04-07 RX ORDER — HEPARIN SODIUM 5000 [USP'U]/ML
5000 INJECTION, SOLUTION INTRAVENOUS; SUBCUTANEOUS EVERY 12 HOURS SCHEDULED
Status: DISCONTINUED | OUTPATIENT
Start: 2021-04-07 | End: 2021-04-09

## 2021-04-07 RX ORDER — BUMETANIDE 0.25 MG/ML
2 INJECTION, SOLUTION INTRAMUSCULAR; INTRAVENOUS
Status: DISCONTINUED | OUTPATIENT
Start: 2021-04-07 | End: 2021-04-09

## 2021-04-07 RX ORDER — ONDANSETRON 2 MG/ML
4 INJECTION INTRAMUSCULAR; INTRAVENOUS EVERY 6 HOURS PRN
Status: DISCONTINUED | OUTPATIENT
Start: 2021-04-07 | End: 2021-04-09

## 2021-04-07 RX ORDER — TEMAZEPAM 7.5 MG/1
15 CAPSULE ORAL NIGHTLY PRN
Status: DISCONTINUED | OUTPATIENT
Start: 2021-04-07 | End: 2021-04-09

## 2021-04-07 RX ORDER — TAMSULOSIN HYDROCHLORIDE 0.4 MG/1
0.8 CAPSULE ORAL NIGHTLY
Status: DISCONTINUED | OUTPATIENT
Start: 2021-04-07 | End: 2021-04-09

## 2021-04-07 RX ORDER — METOCLOPRAMIDE HYDROCHLORIDE 5 MG/ML
5 INJECTION INTRAMUSCULAR; INTRAVENOUS EVERY 8 HOURS PRN
Status: DISCONTINUED | OUTPATIENT
Start: 2021-04-07 | End: 2021-04-09

## 2021-04-07 RX ORDER — NITROGLYCERIN 0.4 MG/1
0.4 TABLET SUBLINGUAL EVERY 5 MIN PRN
Status: DISCONTINUED | OUTPATIENT
Start: 2021-04-07 | End: 2021-04-09

## 2021-04-07 NOTE — ED PROVIDER NOTES
Patient Seen in: [facility] AND [facility] Emergency Department      History   Patient presents with:  Shortness Of Breath    Stated Complaint:     HPI/Subjective:   HPI    58-year-old male with history of CAD, hypertension, hyperlipidemia, diabetes, aortic sten Review of Systems   Constitutional: Negative for fever. HENT: Negative for congestion. Eyes: Negative for visual disturbance. Respiratory: Positive for shortness of breath. Cardiovascular: Positive for leg swelling. Negative for chest pain. Report. Rate: 60  Rhythm: paced rhythm  Reading: normal for rate, abnormal  for rhythm, no acute ST changes    Cardiac Monitor:    Patient placed on the cardiac monitor and a rhythm strip obtained with the indication of shortness of breath.   Monitor shows Eosinophil Absolute 0.35 0.00 - 0.70 x10(3) uL    Basophil Absolute 0.04 0.00 - 0.20 x10(3) uL    Immature Granulocyte Absolute 0.02 0.00 - 1.00 x10(3) uL    Neutrophil % 67.2 %    Lymphocyte % 20.2 %    Monocyte % 6.6 %    Eosinophil % 5.1 %    Meek evaluation.         EMERGENCY DEPARTMENT MEDICAL DECISION MAKING:  After obtaining the patient's history, performing the physical exam and reviewing the diagnostics, multiple initial diagnoses were considered based on the presenting problem including CHF, a

## 2021-04-07 NOTE — ED INITIAL ASSESSMENT (HPI)
Here with complaint of shortness of breath, swelling to BLE and abdomen. States \"  I cant breathe\". Worse with exertion. Seen md last Friday, was told to come to ED but pt stated he was too busy.

## 2021-04-08 ENCOUNTER — APPOINTMENT (OUTPATIENT)
Dept: CV DIAGNOSTICS | Facility: HOSPITAL | Age: 71
DRG: 291 | End: 2021-04-08
Attending: HOSPITALIST
Payer: MEDICARE

## 2021-04-08 PROCEDURE — 93306 TTE W/DOPPLER COMPLETE: CPT | Performed by: HOSPITALIST

## 2021-04-08 PROCEDURE — 99233 SBSQ HOSP IP/OBS HIGH 50: CPT | Performed by: HOSPITALIST

## 2021-04-08 PROCEDURE — 99222 1ST HOSP IP/OBS MODERATE 55: CPT | Performed by: INTERNAL MEDICINE

## 2021-04-08 NOTE — H&P
[facility]    PATIENT'S NAME: Jamaica Zee   ATTENDING PHYSICIAN: Guadalupe Wallace MD   PATIENT ACCOUNT#:   [de-identified]    LOCATION:  [place] [address]  MEDICAL RECORD #:   [id number]       YOB: 1950  ADMISSION DATE:       04/0 Ex-tobacco user. No current tobacco, alcohol, or drug use. Lives with his family. Noncompliant with a low-salt diet. Usually independent in his basic activities of daily living.      REVIEW OF SYSTEMS:  The patient said lately, he has not been compliant [address] [facility] [id number]  AA/

## 2021-04-08 NOTE — PROGRESS NOTES
[facility]  Hospitalist Progress Note     Devon Steiner Patient Status:  Inpatient    7/10/1950  69year old [facility] [id number]   Location 104/104-A Attending Orlando Beltre MD   Hosp Day # 1 PCP Adele Lake MD     Assessment & Plan: [facility]    Labs:  Recent Labs   Lab 04/07/21  1845 04/08/21  0737   RBC 3.64* 3.39*   HGB 10.7* 10.0*   HCT 35.4* 33.2*   MCV 97.3 97.9   MCH 29.4 29.5   MCHC 30.2* 30.1*   RDW 15.6* 15.3*   NEPRELIM 4.65 4.14   WBC 6.9 6.4   .0* 121.0*     Rece

## 2021-04-08 NOTE — CONSULTS
[facility] HOSP - [facility]    Cardiology Consultation  [facility]    Justyna Alvarado Patient Status:  Inpatient    7/10/1950 MRN [id number]   Location [address] [facility] Attending Julio Stock MD   Hosp Day # 1 PCP Ashley Morley pressure    • High cholesterol    • GAB on CPAP    • Osteoarthritis    • Type II or unspecified type diabetes mellitus without mention of complication, not stated as uncontrolled        Past Surgical History  Past Surgical History:   Procedure Laterality D (NITROSTAT) SL tab 0.4 mg, 0.4 mg, Sublingual, Q5 Min PRN  Normal Saline Flush 0.9 % injection 10 mL, 10 mL, Intravenous, PRN  bumetanide (BUMEX) 0.25 MG/ML injection 2 mg, 2 mg, Intravenous, BID (Diuretic)  carvedilol (COREG) tab 25 mg, 25 mg, Oral, BID w 2 capsules by mouth daily. FOLIC ACID 1 MG Oral Tab, TAKE TWO TABLETS BY MOUTH ONCE DAILY (Patient taking differently: Take 1 mg by mouth 2 (two) times daily.  )  Glucose Blood (BLOOD GLUCOSE TEST) In Vitro Strip, Test three times daily as directed.   In 154/61 — — 60 17 98 % — —   04/07/21 2000 152/66 — — 60 16 98 % — —   04/07/21 1945 153/62 — — 59 21 98 % — —   04/07/21 1930 152/61 — — 60 16 98 % — —   04/07/21 1915 145/61 — — 59 16 99 % — —   04/07/21 1910 — — — — — 99 % — —   04/07/21 1900 150/58 — — 6.4 04/08/2021    HGB 10.0 (L) 04/08/2021    HCT 33.2 (L) 04/08/2021    .0 (L) 04/08/2021    CREATSERUM 1.51 (H) 04/08/2021    BUN 24 (H) 04/08/2021     (H) 04/08/2021    K 3.9 04/08/2021     04/08/2021    CO2 32.0 04/08/2021    GLU 61 ( cardiomyopathy. Chronic renal insufficiency. Recommendations:  Has been started on IV Lasix. Will follow. Was on Bumex at home. Echo done, will review. Further recommendations to follow.     Thank you for allowing me to participate in the car

## 2021-04-08 NOTE — CM/SW NOTE
Addendum 2:09:  Pt is refusing [facility] services despite recommendation. MDO to  for discharge planning and [facility] services. Pt admitted 4/7/21 due to shortness of breath, bilateral lower extremity edema, abdominal swelling.      Pt resides with his spouse in

## 2021-04-08 NOTE — HOME CARE LIAISON
Received referral from Caitlin Do. Met with patient at the bedside. At this time patient declining home health services. Caitlin Do notified.
No

## 2021-04-08 NOTE — PLAN OF CARE
Pt admitted from ER. Reports no pain at this time. V paced on tele. Pt is SOB at rest.  Lung sounds diminished with some crackles. Tolerating O2 2L per NC. Smith draining clear yellow urine to gravity. Call light within easy reach. VSS.   No acute di difficulty  - Respiratory Therapy support as indicated  - Manage/alleviate anxiety  - Monitor for signs/symptoms of CO2 retention  Outcome: Progressing     Problem: GENITOURINARY - ADULT  Goal: Absence of urinary retention  Description: INTERVENTIONS:  - A

## 2021-04-08 NOTE — PHYSICAL THERAPY NOTE
PHYSICAL THERAPY EVALUATION - INPATIENT     Room Number: 104/104-A  Evaluation Date: 4/8/2021  Type of Evaluation: Initial   Physician Order: PT Eval and Treat    Presenting Problem: Acute on chronic CHF  Reason for Therapy: Mobility Dysfunction and Disch education; Family education;Gait training;Strengthening;Balance training;Transfer training  Rehab Potential : Good  Frequency (Obs): 3-5x/week       PHYSICAL THERAPY MEDICAL/SOCIAL HISTORY     History related to current admission: patient with hx of Afib, s PAIN ASSESSMENT  Ratin          COGNITION  · Overall Cognitive Status:  WFL - within functional limits    RANGE OF MOTION AND STRENGTH ASSESSMENT  Upper extremity ROM and strength are within functional limits     Lower extremity ROM is within funct tolerated  Gait training  Posture  Strengthening  Transfer training    Patient End of Session: Up in chair;Needs met;Call light within reach;RN aware of session/findings; All patient questions and concerns addressed    CURRENT GOALS    Goals to be met by: 4

## 2021-04-08 NOTE — OCCUPATIONAL THERAPY NOTE
OCCUPATIONAL THERAPY EVALUATION - INPATIENT     Room Number: 104/104-A  Evaluation Date: 4/8/2021  Type of Evaluation: Initial       Physician Order: IP Consult to Occupational Therapy  Reason for Therapy: ADL/IADL Dysfunction and Discharge Planning    [facility] Fibromyalgia    • High blood pressure    • High cholesterol    • GAB on CPAP    • Osteoarthritis    • Type II or unspecified type diabetes mellitus without mention of complication, not stated as uncontrolled        Past Surgical History  Past Surgical Hist teeth?: A Little  -   Eating meals?: A Little    AM-PAC Score:  Score: 18  Approx Degree of Impairment: 46.65%  Standardized Score (AM-PAC Scale): 38.66  CMS Modifier (G-Code): CK    FUNCTIONAL TRANSFER ASSESSMENT   Functional T/F: CGA    FUNCTIONAL ADL AS

## 2021-04-08 NOTE — PLAN OF CARE
Patient's glucose in BMP was 61- blood was drawn at 0730, bedside BS=76 at 0730, given juice and ordered breakfast, MD aware. Up on chair for meals. Pain on upper back, arms and shoulder controlled with [place]. SCD for DVT prophylaxis.   Vital signs within n Assess for changes in mentation and behavior  - Position to facilitate oxygenation and minimize respiratory effort  - Oxygen supplementation based on oxygen saturation or ABGs  - Provide Smoking Cessation handout, if applicable  - Encourage broncho-pulmona

## 2021-04-09 ENCOUNTER — TELEPHONE (OUTPATIENT)
Dept: CARDIOLOGY | Age: 71
End: 2021-04-09

## 2021-04-09 VITALS
WEIGHT: 260.69 LBS | TEMPERATURE: 99 F | OXYGEN SATURATION: 92 % | BODY MASS INDEX: 35.31 KG/M2 | HEIGHT: 72 IN | HEART RATE: 63 BPM | RESPIRATION RATE: 20 BRPM | DIASTOLIC BLOOD PRESSURE: 65 MMHG | SYSTOLIC BLOOD PRESSURE: 136 MMHG

## 2021-04-09 PROCEDURE — 99232 SBSQ HOSP IP/OBS MODERATE 35: CPT | Performed by: INTERNAL MEDICINE

## 2021-04-09 PROCEDURE — 99239 HOSP IP/OBS DSCHRG MGMT >30: CPT | Performed by: HOSPITALIST

## 2021-04-09 RX ORDER — AMLODIPINE BESYLATE 5 MG/1
10 TABLET ORAL DAILY
Status: DISCONTINUED | OUTPATIENT
Start: 2021-04-09 | End: 2021-04-09

## 2021-04-09 RX ORDER — FINASTERIDE 5 MG/1
5 TABLET, FILM COATED ORAL DAILY
Status: DISCONTINUED | OUTPATIENT
Start: 2021-04-09 | End: 2021-04-09

## 2021-04-09 RX ORDER — ASPIRIN 81 MG/1
81 TABLET, CHEWABLE ORAL DAILY
Status: DISCONTINUED | OUTPATIENT
Start: 2021-04-09 | End: 2021-04-09

## 2021-04-09 RX ORDER — PANTOPRAZOLE SODIUM 20 MG/1
20 TABLET, DELAYED RELEASE ORAL EVERY MORNING
Status: DISCONTINUED | OUTPATIENT
Start: 2021-04-09 | End: 2021-04-09

## 2021-04-09 RX ORDER — SENNA AND DOCUSATE SODIUM 50; 8.6 MG/1; MG/1
2 TABLET, FILM COATED ORAL 2 TIMES DAILY
Status: DISCONTINUED | OUTPATIENT
Start: 2021-04-09 | End: 2021-04-09

## 2021-04-09 NOTE — PROGRESS NOTES
[facility]  Hospitalist Progress Note     Luis Manuel Kraus Patient Status:  Inpatient    7/10/1950  69year old CSN [id number]   Location 104/104-A Attending Carmelo Diop MD   Hosp Day # 2 PCP Betsey Underwood MD     Assessment & Plan: [facility]    Labs:  Recent Labs   Lab 04/07/21  1845 04/08/21  0737   RBC 3.64* 3.39*   HGB 10.7* 10.0*   HCT 35.4* 33.2*   MCV 97.3 97.9   MCH 29.4 29.5   MCHC 30.2* 30.1*   RDW 15.6* 15.3*   NEPRELIM 4.65 4.14   WBC 6.9 6.4   .0* 121.0*     Rece spent discussing plan for continued IV diuresis, need to monitor kidney function. We discussed moderating salt intake, checking on his weight at home.   We discussed echo results

## 2021-04-09 NOTE — PROGRESS NOTES
Therapeutic interchange from omeprazole 20 mg daily to pantoprazole 20 mg daily per P&T approved protocol.      Thank you,  Adeline Monzon, SusanD

## 2021-04-09 NOTE — DISCHARGE SUMMARY
[place] HOSPITALIST  DISCHARGE SUMMARY     [facility] Patient Status:  Inpatient    7/10/1950 MRN [id number]   Location [facility] 1W Attending Silverio Peterson MD   Hosp Day # 2 PCP Erik Rowan MD     DATE OF ADMISSION: 2021  DATE breath, chest pain, new neurologic symptoms, other concerning symptoms. PHYSICAL EXAM:  Temp:  [97.7 °F (36.5 °C)-98.7 °F (37.1 °C)] 98.5 °F (36.9 °C)  Pulse:  [60-63] 63  Resp:  [18-20] 20  BP: (136-162)/(55-66) 136/65  Gen: A+Ox3. No distress.    HEEN mouth 2 (two) times daily. Take 6 tabs two times per day   Refills: 0     diphenhydrAMINE-APAP (sleep)  MG Caps      Take 3 tablets by mouth nightly.    Refills: 0     DULoxetine HCl 60 MG Cpep  Commonly known as: CYMBALTA      Take 1 capsule (60 mg t as: FLOMAX      2 tablets nightly. Refills: 0     torsemide 20 MG Tabs  Commonly known as: DEMADEX      Take 20 mg by mouth 2 (two) times daily.    Refills: 0     UltiCare Mini Pen Needles 31G X 6 MM Misc  Generic drug: Insulin Pen Needle       Refills: 0

## 2021-04-09 NOTE — PROGRESS NOTES
[facility]    Cardiology Progress Note  [facility]    [facility] Patient Status:  Inpatient    7/10/1950 MRN [id number]   Location [address] [facility] Attending Silverio Peterson MD   Hosp Day # 2 PCP Khadijah Myers 25 mg Oral TID       Continuous Infusions:     Results:     Lab Results   Component Value Date    WBC 6.4 04/08/2021    HGB 10.0 (L) 04/08/2021    HCT 33.2 (L) 04/08/2021    .0 (L) 04/08/2021    CREATSERUM 1.55 (H) 04/09/2021    BUN 26 (H) 04/09/202 secondary to infarct -consider right ventricular hypertrophy.  ABNORMAL When compared with ECG of 02/24/2020 13:40:21 slighly more regular Electronically signed on 04/08/2021 at 09:09 by Jeff Schaefer MD      Exam:     Pulm: Lungs clear, normal respiratory ef

## 2021-04-09 NOTE — PLAN OF CARE
Chronic pain on his back and shoulders controlled with Norco. Up on chair for meals. No complaints of shortness of breath. Seen by Dr. Kenyetta Mullins, order to discharge home received. Smith catheter discontinued. Saline lock removed, telemetry discontinued.  Jeanna Reeves Term Goal: breath freely    Interventions:   - IV bumex  - supplemental O2  - See additional Care Plan goals for specific interventions  Outcome: Adequate for Discharge     Problem: CARDIOVASCULAR - ADULT  Goal: Maintains optimal cardiac output and hemodyn retention  Outcome: Adequate for Discharge     Problem: GENITOURINARY - ADULT  Goal: Absence of urinary retention  Description: INTERVENTIONS:  - Assess patient’s ability to void and empty bladder  - Monitor intake/output and perform bladder scan as needed

## 2021-04-12 ENCOUNTER — PATIENT OUTREACH (OUTPATIENT)
Dept: CASE MANAGEMENT | Age: 71
End: 2021-04-12

## 2021-04-12 ENCOUNTER — OFFICE VISIT (OUTPATIENT)
Dept: RHEUMATOLOGY | Facility: CLINIC | Age: 71
End: 2021-04-12
Payer: MEDICARE

## 2021-04-12 VITALS
DIASTOLIC BLOOD PRESSURE: 67 MMHG | RESPIRATION RATE: 18 BRPM | HEART RATE: 60 BPM | WEIGHT: 260 LBS | BODY MASS INDEX: 35.21 KG/M2 | HEIGHT: 72 IN | SYSTOLIC BLOOD PRESSURE: 146 MMHG

## 2021-04-12 DIAGNOSIS — M35.3 PMR (POLYMYALGIA RHEUMATICA) (HCC): Primary | ICD-10-CM

## 2021-04-12 DIAGNOSIS — R70.0 ELEVATED SED RATE: ICD-10-CM

## 2021-04-12 DIAGNOSIS — M15.9 OSTEOARTHRITIS OF MULTIPLE JOINTS, UNSPECIFIED OSTEOARTHRITIS TYPE: ICD-10-CM

## 2021-04-12 PROCEDURE — 99214 OFFICE O/P EST MOD 30 MIN: CPT | Performed by: INTERNAL MEDICINE

## 2021-04-12 RX ORDER — HYDROCODONE BITARTRATE AND ACETAMINOPHEN 10; 325 MG/1; MG/1
TABLET ORAL
Qty: 180 TABLET | Refills: 0 | Status: SHIPPED | OUTPATIENT
Start: 2021-05-01 | End: 2021-05-03

## 2021-04-12 NOTE — PROGRESS NOTES
1st attempt SCC/HF apt request    [facility]  [address]  [phone]  [address]    Unable to contact patient. Will try again.

## 2021-04-12 NOTE — PROGRESS NOTES
Bailey Parent is a 69year old male who presents for Patient presents with:  PMR: patient c/o of pain all over joints, upper neck, back, shoulders, and knees  Medication Follow-Up  .    HPI:     He is a pleasant 78 year who has elevated sed rate, P accident in his back yard - he had right knee meniscal tear and he hurt his neck. He couln't move his head. He went to see Dr. Guadalupe Larsen and he couldn't walk on his knee. He was sent to ortho.  He had to go back up on his norco b/c he needed more after the 1 in afternoon and 2 tablets in evening. He has pain more in the evening. It's all over -   Still walking without a cane. He stopped victoza b/c he couldn't afford it. He's still in [facility]. Dr. Eve Jackson is managing his diabetes.  He has been watching He's limited to what he can do. Taking out the garbage he's out of breath. 12/16/2020  Telephone visit ------> changed to VIDEO VISIT   [place] this weekend and spending time in [place].    He had the open heart tee capsule (60 mg total) by mouth 2 (two) times daily. 180 capsule 3   • torsemide 20 MG Oral Tab Take 20 mg by mouth 2 (two) times daily. • amLODIPine Besylate 10 MG Oral Tab Take 1 tablet (10 mg total) by mouth daily.  30 tablet 3   • hydrALAzine HCl 25 daily.     • Coenzyme Q10 (COQ10) 200 MG Oral Cap Take 200 mg by mouth daily. • Cholecalciferol (VITAMIN D3) 2000 UNITS Oral Tab Take 2 tablets by mouth daily.        • Cyanocobalamin (B-12) 1000 MCG Oral Cap Take 1 capsule by mouth 2 (two) times berta Retired railroad, still at [employer]   4 children -   1 daughter 1 nurse, 1 is , 1 beautician,   Son - [place]  -      REVIEW OF SYSTEMS:   Review Of Systems:  All other ROS are negative.      EXAM:   /67   Pulse 60 aorta: Moderate amounts of atheroma, non-mobile     present. 3. Mitral valve: Moderate to severe regurgitation. Systolic flow     reversal in 2/4 pulmonary veins Mitral regurgitation possibly     secondary to mild A2 prolapse.   4. Left atrium: No evidence off pred b/c of sugars -doesn't want to restart - .   - stop  methotrexate B/c of nauseas , stoppe leflunomide b/c of sore throat   - stopped imuarn b/c didn't help pain and was nauseous -    -  stop ssz b/c of -   - now off gabepentin  800mg at night. Arianne Smyth

## 2021-04-13 NOTE — PROGRESS NOTES
2nd attempt SCC/HF apt request     [facility]  [address]  [phone]  [address]  Apt made for Mon. April 19th @2:30pm

## 2021-04-14 ENCOUNTER — TELEPHONE (OUTPATIENT)
Dept: PULMONOLOGY | Facility: CLINIC | Age: 71
End: 2021-04-14

## 2021-04-14 NOTE — TELEPHONE ENCOUNTER
Seems like the patient was in the hospital recently for cardiac issue  Last time I saw the patient in my office in January 2020  I did not see the patient during hospitalization  Patient either to check with his primary MD or cardiology order to ER  Thank

## 2021-04-14 NOTE — TELEPHONE ENCOUNTER
Spoke with patient complaining of shortness of breath with activity, walks about 20 feet gets short of breath. Dry cough once in a while, denies fever, chest pain, chest tightness.   Currently on torsemide, does not have any inhalers, does not know current

## 2021-04-15 NOTE — TELEPHONE ENCOUNTER
Spoke with patient informed him of Dr. Hanh Hoang message. Patient verbalized understanding, states he has upcoming appointment on 4/28/21 to see Dr. Cesar Jesus.

## 2021-04-19 ENCOUNTER — OFFICE VISIT (OUTPATIENT)
Dept: CARDIOLOGY CLINIC | Facility: HOSPITAL | Age: 71
End: 2021-04-19
Attending: NURSE PRACTITIONER
Payer: MEDICARE

## 2021-04-19 VITALS
OXYGEN SATURATION: 93 % | WEIGHT: 263 LBS | SYSTOLIC BLOOD PRESSURE: 156 MMHG | DIASTOLIC BLOOD PRESSURE: 66 MMHG | HEART RATE: 62 BPM | BODY MASS INDEX: 36 KG/M2

## 2021-04-19 DIAGNOSIS — I48.11 LONGSTANDING PERSISTENT ATRIAL FIBRILLATION (HCC): ICD-10-CM

## 2021-04-19 DIAGNOSIS — I50.9 HEART FAILURE, UNSPECIFIED (HCC): ICD-10-CM

## 2021-04-19 DIAGNOSIS — I50.9 ACUTE ON CHRONIC CONGESTIVE HEART FAILURE, UNSPECIFIED HEART FAILURE TYPE (HCC): ICD-10-CM

## 2021-04-19 DIAGNOSIS — G47.33 OBSTRUCTIVE SLEEP APNEA SYNDROME: ICD-10-CM

## 2021-04-19 DIAGNOSIS — I50.21 ACUTE HFREF (HEART FAILURE WITH REDUCED EJECTION FRACTION) (HCC): Primary | ICD-10-CM

## 2021-04-19 PROCEDURE — 83880 ASSAY OF NATRIURETIC PEPTIDE: CPT | Performed by: NURSE PRACTITIONER

## 2021-04-19 PROCEDURE — 36415 COLL VENOUS BLD VENIPUNCTURE: CPT | Performed by: NURSE PRACTITIONER

## 2021-04-19 PROCEDURE — 80048 BASIC METABOLIC PNL TOTAL CA: CPT | Performed by: NURSE PRACTITIONER

## 2021-04-19 PROCEDURE — 99202 OFFICE O/P NEW SF 15 MIN: CPT | Performed by: NURSE PRACTITIONER

## 2021-04-19 PROCEDURE — 99205 OFFICE O/P NEW HI 60 MIN: CPT | Performed by: NURSE PRACTITIONER

## 2021-04-19 RX ORDER — FOLIC ACID 1 MG/1
2 TABLET ORAL DAILY
Qty: 180 TABLET | Refills: 3 | COMMUNITY
Start: 2021-04-19

## 2021-04-19 RX ORDER — HYDRALAZINE HYDROCHLORIDE 25 MG/1
50 TABLET, FILM COATED ORAL 3 TIMES DAILY
COMMUNITY
Start: 2021-04-19

## 2021-04-19 RX ORDER — TORSEMIDE 20 MG/1
TABLET ORAL
Refills: 0 | COMMUNITY
Start: 2021-04-19

## 2021-04-19 NOTE — PATIENT INSTRUCTIONS
Increase torsemide to 40 mg in the morning and 20 mg in the afternoon     Continue all your same medications    Call if having any dizziness, lightheadedness, heart racing, palpitations, chest pain, shortness of breath, coughing,  wheezing, swelling, weigh

## 2021-04-19 NOTE — PROGRESS NOTES
[address] [facility] Patient Status:  No patient class for patient encounter    7/10/1950 MRN [id number]   Location [address] [place] [address] Lindie Bunnell, MD Wyvonne Landau is a 69 year o Systems:  Constitutional: negative for chills or fever  Respiratory:  negative for cough, hemoptysis and wheezing  Cardiovascular: negative for chest pain, exertional chest pressure/discomfort, near-syncope, orthopnea and palpitations  Gastrointestinal: ne cyanosis; 3+ right lower leg edema 10 cm above the knee; 2+ left lower leg edema up to the knee  Pulses: 2+ and symmetric  Neurologic: Grossly normal    Diagnostics:  EC/7 Junctional rhythm    Echocardiogram: , EF 50-55%, no wma, MV mod stenosis, mi 40 mg of torsemide tonight    Continue all your same medications    Call if having any dizziness, lightheadedness, heart racing, palpitations, chest pain, shortness of breath, coughing,  wheezing, swelling, weight gain,  or weakness    Weigh yourself daily 180 capsule, Rfl: 3  •  amLODIPine Besylate 10 MG Oral Tab, Take 1 tablet (10 mg total) by mouth daily. , Disp: 30 tablet, Rfl: 3  •  Multiple Vitamins-Minerals (CENTRUM SILVER 50+MEN OR), Take 0.5 tablets by mouth 2 (two) times a day.  , Disp: , Rfl:   • MG Oral Tab, Take 6 tablets by mouth 2 (two) times daily. Take 6 tabs two times per day , Disp: , Rfl:   •  Garlic 2403 MG Oral Cap, Take 2 capsules by mouth 2 (two) times daily.  Take 2 tabs two times per day , Disp: , Rfl:   •  LANTUS SOLOSTAR 100 UNIT/ML

## 2021-04-21 ENCOUNTER — TELEPHONE (OUTPATIENT)
Dept: CARDIOLOGY CLINIC | Facility: HOSPITAL | Age: 71
End: 2021-04-21

## 2021-04-21 NOTE — TELEPHONE ENCOUNTER
Phoned the patient to ensure that the symptom of sob is resolving after increasing diuretic dose. Patient reported that medication is effective - he has an increased urinary output and sob has gotten better.  We will see patient in [facility] on Monday for re-eval

## 2021-04-21 NOTE — PROGRESS NOTES
[address] [facility] Patient Status:  No patient class for patient encounter    7/10/1950 MRN [id number]   Location [address] [place] [address] Samira Cisse MD  Matthew Neri is a 69 year o Systems:  Constitutional: negative for chills or fever  Respiratory:  negative for cough, hemoptysis and wheezing  Cardiovascular: negative for chest pain, exertional chest pressure/discomfort, near-syncope, orthopnea and palpitations  Gastrointestinal: ne no cyanosis; 3+ right lower leg edema 10 cm above the knee; 2+ left lower leg edema up to the knee  Pulses: 2+ and symmetric  Neurologic: Grossly normal    Diagnostics:  EC/7 Junctional rhythm    Echocardiogram: , EF 50-55%, no wma, MV mod stenosis, Take 40 mg of torsemide tonight    Continue all your same medications    Call if having any dizziness, lightheadedness, heart racing, palpitations, chest pain, shortness of breath, coughing,  wheezing, swelling, weight gain,  or weakness    Weigh yours daily., Disp: 180 capsule, Rfl: 3  •  amLODIPine Besylate 10 MG Oral Tab, Take 1 tablet (10 mg total) by mouth daily. , Disp: 30 tablet, Rfl: 3  •  Insulin Lispro 100 UNIT/ML Subcutaneous Solution, Inject 15-40 Units into the skin 3 (three) times daily befo Cholecalciferol (VITAMIN D3) 2000 UNITS Oral Tab, Take 2 tablets by mouth daily. , Disp: , Rfl:   •  Cyanocobalamin (B-12) 1000 MCG Oral Cap, Take 1 capsule by mouth 2 (two) times daily.  Take 1 tab two times a day , Disp: , Rfl:   •  Cranberry 450 MG Oral

## 2021-04-26 ENCOUNTER — OFFICE VISIT (OUTPATIENT)
Dept: CARDIOLOGY CLINIC | Facility: HOSPITAL | Age: 71
End: 2021-04-26
Attending: NURSE PRACTITIONER
Payer: MEDICARE

## 2021-04-26 VITALS
BODY MASS INDEX: 34 KG/M2 | SYSTOLIC BLOOD PRESSURE: 127 MMHG | HEART RATE: 60 BPM | OXYGEN SATURATION: 94 % | WEIGHT: 248 LBS | DIASTOLIC BLOOD PRESSURE: 51 MMHG

## 2021-04-26 DIAGNOSIS — G47.33 OSA ON CPAP: Chronic | ICD-10-CM

## 2021-04-26 DIAGNOSIS — I50.21 ACUTE HFREF (HEART FAILURE WITH REDUCED EJECTION FRACTION) (HCC): Primary | ICD-10-CM

## 2021-04-26 DIAGNOSIS — I50.9 HEART FAILURE, UNSPECIFIED (HCC): ICD-10-CM

## 2021-04-26 DIAGNOSIS — Z95.818 S/P MITRAL VALVE CLIP IMPLANTATION: ICD-10-CM

## 2021-04-26 DIAGNOSIS — I48.11 LONGSTANDING PERSISTENT ATRIAL FIBRILLATION (HCC): ICD-10-CM

## 2021-04-26 DIAGNOSIS — J96.21 ACUTE ON CHRONIC RESPIRATORY FAILURE WITH HYPOXEMIA (HCC): Chronic | ICD-10-CM

## 2021-04-26 DIAGNOSIS — J44.9 STAGE 4 VERY SEVERE COPD BY GOLD CLASSIFICATION (HCC): Chronic | ICD-10-CM

## 2021-04-26 DIAGNOSIS — Z99.89 OSA ON CPAP: Chronic | ICD-10-CM

## 2021-04-26 DIAGNOSIS — I42.0 DILATED CARDIOMYOPATHY (HCC): ICD-10-CM

## 2021-04-26 DIAGNOSIS — Z98.890 S/P MITRAL VALVE CLIP IMPLANTATION: ICD-10-CM

## 2021-04-26 PROCEDURE — 36415 COLL VENOUS BLD VENIPUNCTURE: CPT | Performed by: NURSE PRACTITIONER

## 2021-04-26 PROCEDURE — 80048 BASIC METABOLIC PNL TOTAL CA: CPT | Performed by: NURSE PRACTITIONER

## 2021-04-26 PROCEDURE — 99212 OFFICE O/P EST SF 10 MIN: CPT | Performed by: NURSE PRACTITIONER

## 2021-04-26 PROCEDURE — 83880 ASSAY OF NATRIURETIC PEPTIDE: CPT | Performed by: NURSE PRACTITIONER

## 2021-04-26 PROCEDURE — 99214 OFFICE O/P EST MOD 30 MIN: CPT | Performed by: NURSE PRACTITIONER

## 2021-04-26 RX ORDER — ALBUTEROL SULFATE 90 UG/1
2 AEROSOL, METERED RESPIRATORY (INHALATION) EVERY 4 HOURS PRN
Qty: 1 INHALER | Refills: 0 | Status: SHIPPED | OUTPATIENT
Start: 2021-04-26

## 2021-04-26 NOTE — PROGRESS NOTES
[address] [facility] Patient Status:  No patient class for patient encounter    7/10/1950 MRN [id number]   Location [address] [place] [address] MD Jarvis Smith is a 69 year o wheezing  Cardiovascular: negative for chest pain, exertional chest pressure/discomfort, near-syncope, orthopnea and palpitations  Gastrointestinal: negative for abdominal pain, diarrhea, melena, nausea and vomiting  Hematologic/lymphatic: negative  Muscul 2+ and symmetric  Neurologic: Grossly normal    Diagnostics:  EC/7 Junctional rhythm    Echocardiogram: , EF 50-55%, no wma, MV mod stenosis, mild regurgitation, right ventricle pressure overload; LA dilated; right ventricle systolic function reduce severe, with chronic respiratory failure with hypoxemia on home O2  - last FEV1 41 %, FEV1/FVC ratio 62%,   -Not using home O2 since discharge  -Follow-up with Dr. Saba Villarreal on 4/28/2021  -Begin albuterol MDI 2 puffs every 4 hours as needed and start taking 2 activity to prevent shortness of breath or fatigue.  Stop exercise if you develop chest pain, lightheadedness, or significant shortness of breath        Current Outpatient Medications:   •  folic acid 1 MG Oral Tab, Take 2 tablets (2 mg total) by mouth berta MG Oral Cap, Take 2 capsules by mouth daily.   , Disp: , Rfl:   •  Glucose Blood (BLOOD GLUCOSE TEST) In Vitro Strip, Test three times daily as directed., Disp: , Rfl:   •  Insulin Pen Needle (PEN NEEDLES 3/16\") 31G X 5 MM Does not apply Misc, Use three ti

## 2021-04-26 NOTE — PATIENT INSTRUCTIONS
Use home oxygen at 2 Liters per nasal cannula walking outside in the yard or when walking more than 100 feet and as needed at rest.     Start using albuterol albuterol metered dose inhaler - 2 puffs every 4 hours as needed for cough, wheezing or worsening

## 2021-04-28 ENCOUNTER — OFFICE VISIT (OUTPATIENT)
Dept: PULMONOLOGY | Facility: CLINIC | Age: 71
End: 2021-04-28
Payer: MEDICARE

## 2021-04-28 VITALS
SYSTOLIC BLOOD PRESSURE: 123 MMHG | RESPIRATION RATE: 18 BRPM | DIASTOLIC BLOOD PRESSURE: 52 MMHG | HEART RATE: 59 BPM | HEIGHT: 72 IN | BODY MASS INDEX: 33.86 KG/M2 | WEIGHT: 250 LBS | OXYGEN SATURATION: 92 % | TEMPERATURE: 98 F

## 2021-04-28 DIAGNOSIS — G47.33 OSA ON CPAP: ICD-10-CM

## 2021-04-28 DIAGNOSIS — Z99.89 OSA ON CPAP: ICD-10-CM

## 2021-04-28 DIAGNOSIS — J44.9 CHRONIC OBSTRUCTIVE PULMONARY DISEASE, UNSPECIFIED COPD TYPE (HCC): Primary | ICD-10-CM

## 2021-04-28 DIAGNOSIS — J96.11 CHRONIC RESPIRATORY FAILURE WITH HYPOXIA (HCC): ICD-10-CM

## 2021-04-28 PROCEDURE — 1111F DSCHRG MED/CURRENT MED MERGE: CPT | Performed by: INTERNAL MEDICINE

## 2021-04-28 PROCEDURE — 99214 OFFICE O/P EST MOD 30 MIN: CPT | Performed by: INTERNAL MEDICINE

## 2021-04-28 RX ORDER — FLUTICASONE FUROATE, UMECLIDINIUM BROMIDE AND VILANTEROL TRIFENATATE 100; 62.5; 25 UG/1; UG/1; UG/1
1 POWDER RESPIRATORY (INHALATION) DAILY
Qty: 1 EACH | Refills: 5 | Status: SHIPPED | OUTPATIENT
Start: 2021-04-28

## 2021-04-28 NOTE — PROGRESS NOTES
HPI/Subjective:   Patient ID: Victorina Balderas is a 69year old male.     HPI  Had recent hospitalization for fluid overload and CHF exacerbation and now feels better with less edema  Compliant with his CPAP and he uses it every night  Cannot sleep withou Solution Inject 15-40 Units into the skin 3 (three) times daily before meals. Insulin to carb ratio 1:3, plus sliding scale as directed. Max daily dose 100 units.   0   • Multiple Vitamins-Minerals (CENTRUM SILVER 50+MEN OR) Take 0.5 tablets by mouth 2 (two 100 UNIT/ML Subcutaneous Solution Pen-injector 50 Units every morning. • tamsulosin HCl (FLOMAX) 0.4 MG Oral Cap 2 tablets nightly.          Allergies:No Known Allergies    Objective:   Physical Exam  Constitutional:       General: He is not in acute /exercsie and weight reduction      Avoid driving if sleepy      Avoid extra use of sedative and narcotics      3-multiple small nonspecific lung nodules all sub-centimeters  Stable from 6/2018 to 11/2018   Stable on last ct 9/2019   Does not want further

## 2021-05-03 ENCOUNTER — TELEPHONE (OUTPATIENT)
Dept: RHEUMATOLOGY | Facility: CLINIC | Age: 71
End: 2021-05-03

## 2021-05-03 RX ORDER — HYDROCODONE BITARTRATE AND ACETAMINOPHEN 10; 325 MG/1; MG/1
TABLET ORAL
Qty: 180 TABLET | Refills: 0 | Status: SHIPPED | OUTPATIENT
Start: 2021-05-03 | End: 2021-06-01

## 2021-05-03 NOTE — TELEPHONE ENCOUNTER
Ty Alexandre from [facility] pharmacy called needing a note or documentation on high dose opioid increase for the pt. Pt went from 5 to 6 in narco dosage and needs something from the Dr or nurse.

## 2021-05-03 NOTE — TELEPHONE ENCOUNTER
Provided quote below from Dr. Analia Diaz note on 04/12/2021. To pharmacist Corby Padilla. \"Only on norco prn and He's doing better on dulxoetine 60mg bid.    - refill norco - he is asking to  increased from 150 to #180 - will do this but in future if he needs

## 2021-05-03 NOTE — TELEPHONE ENCOUNTER
Dr Abhijit Villanueva. Please advise.      Outpatient Medication Detail     Disp Refills Start End    HYDROcodone-acetaminophen  MG Oral Tab 180 tablet 0 2021     Si tablets in am , 2 in evening and 2 at bedtime    Sent to pharmacy as: HYDROcodone-Acetaminophe

## 2021-05-03 NOTE — TELEPHONE ENCOUNTER
Patient is requesting a medication refill for     HYDROcodone-acetaminophen  MG Oral Tab    Please send to [facility] in [place].

## 2021-05-21 ENCOUNTER — ANCILLARY PROCEDURE (OUTPATIENT)
Dept: CARDIOLOGY | Age: 71
End: 2021-05-21
Attending: INTERNAL MEDICINE

## 2021-05-21 VITALS
HEIGHT: 72 IN | BODY MASS INDEX: 34.4 KG/M2 | DIASTOLIC BLOOD PRESSURE: 62 MMHG | HEART RATE: 60 BPM | WEIGHT: 254 LBS | SYSTOLIC BLOOD PRESSURE: 130 MMHG

## 2021-05-21 DIAGNOSIS — Z45.018 PACEMAKER REPROGRAMMING/CHECK: ICD-10-CM

## 2021-05-21 PROCEDURE — 93281 PM DEVICE PROGR EVAL MULTI: CPT | Performed by: INTERNAL MEDICINE

## 2021-05-25 RX ORDER — HYDRALAZINE HYDROCHLORIDE 50 MG/1
TABLET, FILM COATED ORAL
Qty: 180 TABLET | Refills: 1 | Status: SHIPPED | OUTPATIENT
Start: 2021-05-25

## 2021-06-01 NOTE — TELEPHONE ENCOUNTER
Patient is requesting refill of medication HYDROcodone-acetaminophen  MG Oral Tab.  Patient states pharmacist is requesting to speak with doctor regarding this refill request.

## 2021-06-01 NOTE — TELEPHONE ENCOUNTER
Refill request on hydrocodone  LOV: 4/12/21 - Last refill 5/3/21 #180, 0  Future Appointments   Date Time Provider Hien Morales   10/11/2021  2:30 PM Dyllan Keene MD [facility]   11/3/2021  2:00 PM Da Burkett MD [facility]

## 2021-06-02 RX ORDER — HYDROCODONE BITARTRATE AND ACETAMINOPHEN 10; 325 MG/1; MG/1
TABLET ORAL
Qty: 180 TABLET | Refills: 0 | Status: SHIPPED | OUTPATIENT
Start: 2021-06-02 | End: 2021-07-30

## 2021-06-29 ENCOUNTER — ANCILLARY PROCEDURE (OUTPATIENT)
Dept: CARDIOLOGY | Age: 71
End: 2021-06-29
Attending: INTERNAL MEDICINE

## 2021-06-29 ENCOUNTER — ANCILLARY ORDERS (OUTPATIENT)
Dept: CARDIOLOGY | Age: 71
End: 2021-06-29

## 2021-06-29 DIAGNOSIS — Z45.018 ENCOUNTER FOR CARE OF PACEMAKER: ICD-10-CM

## 2021-06-29 PROCEDURE — X1114 CARDIAC DEVICE HOME CHECK - REMOTE UNSCHEDULED: HCPCS | Performed by: INTERNAL MEDICINE

## 2021-06-29 PROCEDURE — 93294 REM INTERROG EVL PM/LDLS PM: CPT | Performed by: INTERNAL MEDICINE

## 2021-06-29 RX ORDER — TORSEMIDE 20 MG/1
TABLET ORAL
Qty: 180 TABLET | Refills: 0 | OUTPATIENT
Start: 2021-06-29

## 2021-06-29 RX ORDER — AMLODIPINE BESYLATE 10 MG/1
TABLET ORAL
Qty: 90 TABLET | Refills: 0 | OUTPATIENT
Start: 2021-06-29

## 2021-06-30 RX ORDER — AMLODIPINE BESYLATE 10 MG/1
10 TABLET ORAL DAILY
Qty: 90 TABLET | Refills: 0 | Status: SHIPPED | OUTPATIENT
Start: 2021-06-30

## 2021-06-30 RX ORDER — TORSEMIDE 20 MG/1
20 TABLET ORAL 3 TIMES DAILY
Qty: 270 TABLET | Refills: 0 | Status: SHIPPED | OUTPATIENT
Start: 2021-06-30

## 2021-07-16 ENCOUNTER — TELEPHONE (OUTPATIENT)
Dept: PULMONOLOGY | Facility: CLINIC | Age: 71
End: 2021-07-16

## 2021-07-30 RX ORDER — HYDROCODONE BITARTRATE AND ACETAMINOPHEN 10; 325 MG/1; MG/1
TABLET ORAL
Qty: 180 TABLET | Refills: 0 | Status: SHIPPED | OUTPATIENT
Start: 2021-07-30 | End: 2021-09-02

## 2021-07-30 NOTE — TELEPHONE ENCOUNTER
Dr. Adriana Peng, please advise on Norco refill amount. Per LOV note:  He feels norco iscontrolling his pain. e states he has been on norco for 5 years taking 2 at night only now he is increasing. So he doesn't feel he will need more.  He understands that I won't pr

## 2021-07-30 NOTE — TELEPHONE ENCOUNTER
Patient is requesting a refill.     HYDROcodone-acetaminophen  MG Oral Tab 180 tablet 0 2021    Si tablets in am , 2 in evening and 2 at bedtime     Route:   (none)     Earliest Fill Date:   2021     Order #:   [id number]

## 2021-09-02 RX ORDER — HYDROCODONE BITARTRATE AND ACETAMINOPHEN 10; 325 MG/1; MG/1
TABLET ORAL
Qty: 180 TABLET | Refills: 0 | Status: SHIPPED | OUTPATIENT
Start: 2021-09-02 | End: 2021-10-04

## 2021-09-02 NOTE — TELEPHONE ENCOUNTER
Order pended.     LOV: 04/12/2021  Future Appointments   Date Time Provider Hien Cabralesi   10/11/2021  2:30 PM John Rodriguez MD [facility]   11/3/2021  2:00 PM Jordan Coppola MD [facility]   LABS:   Component      Latest Ref pm   - can't take nsaids b/c of coumbadin and renal isuff - baseline cr is 1.3 - 1.4 -     tocalizumab - consider this - info given on this - and info on cimzia given in the past     Couldn't tolerated mtx or leflunomide - or azathioprine 50mg a day  - off 2. Cont.  norco 10/325mg increase afternoon dosing - 2 tablets in am, 2 in afternoon and 2 in the pm -   #180 -   3. Return to clinic in 6 months   4.  Check labs

## 2021-09-02 NOTE — TELEPHONE ENCOUNTER
Patient is requesting a refill on the following medication. Patient is completely out now.      Medication Detail    Medication Quantity Refills Start End   HYDROcodone-acetaminophen  MG Oral Tab 180 tablet 0 2021    Si tablets in am , 2 in

## 2021-10-04 RX ORDER — HYDROCODONE BITARTRATE AND ACETAMINOPHEN 10; 325 MG/1; MG/1
TABLET ORAL
Qty: 180 TABLET | Refills: 0 | Status: SHIPPED | OUTPATIENT
Start: 2021-10-04 | End: 2021-11-02

## 2021-10-04 NOTE — TELEPHONE ENCOUNTER
Last norco refill: 9/2/21 quantity 180 refills 0    LOV: 4/12/21  Future Appointments   Date Time Provider Hien Morales   10/11/2021  2:30 PM Terrence Mayes MD 2014 [facility]   11/3/2021  2:00 PM Annika Gunn MD [facility]

## 2021-11-02 RX ORDER — HYDROCODONE BITARTRATE AND ACETAMINOPHEN 10; 325 MG/1; MG/1
TABLET ORAL
Qty: 180 TABLET | Refills: 0 | Status: SHIPPED | OUTPATIENT
Start: 2021-11-02

## 2021-11-02 NOTE — TELEPHONE ENCOUNTER
Norco Refill Request  Medication Quantity Refills Start End   HYDROcodone-acetaminophen  MG Oral Tab 180 tablet 0 10/4/2021    Si tablets in am , 2 in evening and 2 at bedtime       LOV: 21  Future Appointments   Date Time Provider Departm

## 2021-11-02 NOTE — TELEPHONE ENCOUNTER
Pt would like a refill on his hydrocodone medication. Pt states that he is out of his medication.  Pharmacy: [facility] (listed)     Current Outpatient Medications   Medication Sig Dispense Refill   • HYDROcodone-acetaminophen  MG Oral T

## 2022-05-02 ENCOUNTER — APPOINTMENT (OUTPATIENT)
Dept: CARDIOLOGY | Age: 72
End: 2022-05-02
Attending: INTERNAL MEDICINE

## 2022-07-09 ENCOUNTER — TELEPHONE ENCOUNTER (OUTPATIENT)
Dept: URBAN - METROPOLITAN AREA CLINIC 121 | Facility: CLINIC | Age: 72
End: 2022-07-09

## 2022-07-10 ENCOUNTER — TELEPHONE ENCOUNTER (OUTPATIENT)
Dept: URBAN - METROPOLITAN AREA CLINIC 121 | Facility: CLINIC | Age: 72
End: 2022-07-10

## 2022-07-20 ENCOUNTER — OFFICE VISIT (OUTPATIENT)
Dept: URBAN - METROPOLITAN AREA CLINIC 60 | Facility: CLINIC | Age: 72
End: 2022-07-20

## 2023-04-05 ENCOUNTER — OFFICE VISIT (OUTPATIENT)
Dept: URBAN - METROPOLITAN AREA CLINIC 60 | Facility: CLINIC | Age: 73
End: 2023-04-05

## 2023-04-05 RX ORDER — DAPAGLIFLOZIN 10 MG/1
TAKE 1 TABLET BY MOUTH ONCE DAILY FOR 30 DAYS TABLET, FILM COATED ORAL
Qty: 30 EACH | Refills: 0 | Status: ACTIVE | COMMUNITY

## 2023-04-05 RX ORDER — GABAPENTIN 300 MG/1
TAKE 1 TO 2 CAPSULES BY MOUTH AT BEDTIME AS DIRECTED FOR NERVE PAIN CAPSULE ORAL
Qty: 60 EACH | Refills: 1 | Status: ACTIVE | COMMUNITY

## 2023-04-05 RX ORDER — METFORMIN HYDROCHLORIDE 500 MG/1
TAKE 1 TABLET BY MOUTH TWICE DAILY BEFORE BREAKFAST AND BEFORE SUPPER TABLET, EXTENDED RELEASE ORAL
Qty: 180 EACH | Refills: 0 | Status: ACTIVE | COMMUNITY

## 2023-04-05 RX ORDER — LEVOTHYROXINE SODIUM 0.07 MG/1
TAKE 1 TABLET BY MOUTH ONCE DAILY TABLET ORAL
Qty: 90 EACH | Refills: 0 | Status: ACTIVE | COMMUNITY

## 2023-04-05 RX ORDER — HYDROCODONE BITARTRATE AND ACETAMINOPHEN 10; 325 MG/1; MG/1
TAKE 2 TABLETS BY MOUTH EVERY 8 HOURS AS NEEDED FOR PAIN TABLET ORAL
Qty: 180 EACH | Refills: 0 | Status: ACTIVE | COMMUNITY

## 2023-04-05 RX ORDER — LISINOPRIL 40 MG/1
TAKE 1 TABLET BY MOUTH ONCE DAILY FOR 90 DAYS TABLET ORAL
Qty: 90 EACH | Refills: 4 | Status: ACTIVE | COMMUNITY

## 2023-04-05 RX ORDER — CARVEDILOL 25 MG/1
TAKE 1 TABLET BY MOUTH TWICE DAILY TABLET, FILM COATED ORAL
Qty: 180 EACH | Refills: 3 | Status: ACTIVE | COMMUNITY

## 2023-04-05 RX ORDER — METOLAZONE 5 MG/1
TAKE 1 TABLET BY MOUTH ONCE DAILY TABLET ORAL
Qty: 30 EACH | Refills: 0 | Status: ACTIVE | COMMUNITY

## 2023-04-05 RX ORDER — AMLODIPINE BESYLATE 10 MG/1
TAKE 1 TABLET BY MOUTH ONCE DAILY TABLET ORAL
Qty: 90 EACH | Refills: 0 | Status: ACTIVE | COMMUNITY

## 2023-04-05 RX ORDER — DULOXETINE 60 MG/1
TAKE 1 CAPSULE BY MOUTH TWICE DAILY CAPSULE, DELAYED RELEASE ORAL
Qty: 180 EACH | Refills: 0 | Status: ACTIVE | COMMUNITY

## 2023-04-05 NOTE — HPI-TODAY'S VISIT:
72-year-old male with diabetes mellitus, hypertension, pulmonary hypertension, CHF, A-fib status post Watchman is referred to the office for colorectal cancer screening.

## 2023-05-05 NOTE — PATIENT INSTRUCTIONS
You were seen today for PMR  Get blood work today  Refilled norco  Follow up with Dr. Andrea Jansen end of feb or beg of march Terbinafine Counseling: Patient counseling regarding adverse effects of terbinafine including but not limited to headache, diarrhea, rash, upset stomach, liver function test abnormalities, itching, taste/smell disturbance, nausea, abdominal pain, and flatulence.  There is a rare possibility of liver failure that can occur when taking terbinafine.  The patient understands that a baseline LFT and kidney function test may be required. The patient verbalized understanding of the proper use and possible adverse effects of terbinafine.  All of the patient's questions and concerns were addressed.

## 2023-07-14 ENCOUNTER — OFFICE VISIT (OUTPATIENT)
Dept: URBAN - METROPOLITAN AREA CLINIC 60 | Facility: CLINIC | Age: 73
End: 2023-07-14
Payer: MEDICARE

## 2023-07-14 ENCOUNTER — WEB ENCOUNTER (OUTPATIENT)
Dept: URBAN - METROPOLITAN AREA CLINIC 60 | Facility: CLINIC | Age: 73
End: 2023-07-14

## 2023-07-14 ENCOUNTER — LAB OUTSIDE AN ENCOUNTER (OUTPATIENT)
Dept: URBAN - METROPOLITAN AREA CLINIC 60 | Facility: CLINIC | Age: 73
End: 2023-07-14

## 2023-07-14 VITALS
SYSTOLIC BLOOD PRESSURE: 136 MMHG | HEART RATE: 62 BPM | WEIGHT: 225.6 LBS | RESPIRATION RATE: 12 BRPM | DIASTOLIC BLOOD PRESSURE: 72 MMHG | BODY MASS INDEX: 30.56 KG/M2 | OXYGEN SATURATION: 92 % | TEMPERATURE: 98.9 F | HEIGHT: 72 IN

## 2023-07-14 DIAGNOSIS — Z86.010 PERSONAL HISTORY OF COLONIC POLYPS: ICD-10-CM

## 2023-07-14 DIAGNOSIS — K59.03 DRUG-INDUCED CONSTIPATION: ICD-10-CM

## 2023-07-14 PROBLEM — 428283002: Status: ACTIVE | Noted: 2023-07-14

## 2023-07-14 PROCEDURE — 99203 OFFICE O/P NEW LOW 30 MIN: CPT | Performed by: PHYSICIAN ASSISTANT

## 2023-07-14 RX ORDER — TAMSULOSIN HYDROCHLORIDE 0.4 MG/1
1 CAPSULE CAPSULE ORAL ONCE A DAY
Status: ACTIVE | COMMUNITY

## 2023-07-14 RX ORDER — TESTOSTERONE CYPIONATE 200 MG/ML
1 ML INJECTION INTRAMUSCULAR
Status: ACTIVE | COMMUNITY

## 2023-07-14 RX ORDER — LEVOTHYROXINE SODIUM 0.07 MG/1
TAKE 1 TABLET BY MOUTH ONCE DAILY TABLET ORAL
Qty: 90 EACH | Refills: 0 | Status: ACTIVE | COMMUNITY

## 2023-07-14 RX ORDER — LISINOPRIL 40 MG/1
TAKE 1 TABLET BY MOUTH ONCE DAILY FOR 90 DAYS TABLET ORAL
Qty: 90 EACH | Refills: 4 | Status: ACTIVE | COMMUNITY

## 2023-07-14 RX ORDER — POLYETHYLENE GLYCOL 3350, SODIUM SULFATE ANHYDROUS, SODIUM BICARBONATE, SODIUM CHLORIDE, POTASSIUM CHLORIDE 236; 22.74; 6.74; 5.86; 2.97 G/4L; G/4L; G/4L; G/4L; G/4L
4000ML POWDER, FOR SOLUTION ORAL ONCE
Qty: 4000 MILLILITER | Refills: 0 | OUTPATIENT
Start: 2023-07-14

## 2023-07-14 RX ORDER — DULOXETINE 60 MG/1
TAKE 1 CAPSULE BY MOUTH TWICE DAILY CAPSULE, DELAYED RELEASE ORAL
Qty: 180 EACH | Refills: 0 | Status: ACTIVE | COMMUNITY

## 2023-07-14 RX ORDER — FINASTERIDE 5 MG/1
1 TABLET TABLET, FILM COATED ORAL ONCE A DAY
Status: ACTIVE | COMMUNITY

## 2023-07-14 RX ORDER — HYDROCODONE BITARTRATE AND ACETAMINOPHEN 10; 325 MG/1; MG/1
TAKE 2 TABLETS BY MOUTH EVERY 8 HOURS AS NEEDED FOR PAIN TABLET ORAL
Qty: 180 EACH | Refills: 0 | Status: ACTIVE | COMMUNITY

## 2023-07-14 RX ORDER — CARVEDILOL 25 MG/1
TAKE 1 TABLET BY MOUTH TWICE DAILY TABLET, FILM COATED ORAL
Qty: 180 EACH | Refills: 3 | Status: ACTIVE | COMMUNITY

## 2023-07-14 RX ORDER — AMLODIPINE BESYLATE 10 MG/1
TAKE 1 TABLET BY MOUTH ONCE DAILY TABLET ORAL
Qty: 90 EACH | Refills: 0 | Status: ACTIVE | COMMUNITY

## 2023-07-14 RX ORDER — GABAPENTIN 300 MG/1
TAKE 1 TO 2 CAPSULES BY MOUTH AT BEDTIME AS DIRECTED FOR NERVE PAIN CAPSULE ORAL
Qty: 60 EACH | Refills: 1 | Status: ACTIVE | COMMUNITY

## 2023-07-14 RX ORDER — SEMAGLUTIDE 1.34 MG/ML
AS DIRECTED INJECTION, SOLUTION SUBCUTANEOUS
Status: ACTIVE | COMMUNITY

## 2023-07-14 RX ORDER — METOLAZONE 5 MG/1
TAKE 1 TABLET BY MOUTH ONCE DAILY TABLET ORAL
Qty: 30 EACH | Refills: 0 | Status: ACTIVE | COMMUNITY

## 2023-07-14 NOTE — HPI-TODAY'S VISIT:
72-year-old male with diabetes mellitus, hypertension,  CHF, A-fib status post Watchman  2-3 years ago, PPM is referred to the office to schedule surveillance colonoscopy.  He has complaints of constipation related to opioids and takes senokot  twice a day. States sometimes his stools are still occasionally firm on this regimen. He has no blood in the stool. He denies abdominal pain. He has no pyrosis, dysphagia, odynophagia, nausea, vomiting.   Last colonoscopy was done 10 years ago in [place]. States he had 11  polyps removed. States he has been putting off colonoscopy due to other medical problems.   He has no family history of colon cancer.

## 2023-08-07 ENCOUNTER — TELEPHONE ENCOUNTER (OUTPATIENT)
Dept: URBAN - METROPOLITAN AREA CLINIC 63 | Facility: CLINIC | Age: 73
End: 2023-08-07

## 2023-08-21 ENCOUNTER — CLAIMS CREATED FROM THE CLAIM WINDOW (OUTPATIENT)
Dept: URBAN - METROPOLITAN AREA CLINIC 4 | Facility: CLINIC | Age: 73
End: 2023-08-21
Payer: MEDICARE

## 2023-08-21 ENCOUNTER — CLAIMS CREATED FROM THE CLAIM WINDOW (OUTPATIENT)
Dept: URBAN - METROPOLITAN AREA SURGERY CENTER 4 | Facility: SURGERY CENTER | Age: 73
End: 2023-08-21
Payer: MEDICARE

## 2023-08-21 DIAGNOSIS — D49.0 NEOPLASM OF UNSPECIFIED BEHAVIOR OF DIGESTIVE SYSTEM: ICD-10-CM

## 2023-08-21 DIAGNOSIS — K57.30 DIVERTCULOSIS OF LG INT W/O PERFORATION OR ABSCESS W/O BLEEDING: ICD-10-CM

## 2023-08-21 DIAGNOSIS — D12.3 POLYP OF TRANSVERSE COLON: ICD-10-CM

## 2023-08-21 DIAGNOSIS — K63.5 BENIGN COLON POLYPS: ICD-10-CM

## 2023-08-21 DIAGNOSIS — D12.4 POLYP OF DESCENDING COLON: ICD-10-CM

## 2023-08-21 DIAGNOSIS — D12.0 POLYP OF CECUM: ICD-10-CM

## 2023-08-21 DIAGNOSIS — K57.30 DIVERTICULA OF COLON: ICD-10-CM

## 2023-08-21 DIAGNOSIS — Z12.11 COLON CANCER SCREENING (HIGH RISK): ICD-10-CM

## 2023-08-21 DIAGNOSIS — Z86.010 PERSONAL HISTORY OF COLONIC POLYPS: ICD-10-CM

## 2023-08-21 DIAGNOSIS — D12.3 BENIGN NEOPLASM OF TRANSVERSE COLON: ICD-10-CM

## 2023-08-21 DIAGNOSIS — K64.0 GRADE I HEMORRHOIDS: ICD-10-CM

## 2023-08-21 PROCEDURE — 45380 COLONOSCOPY AND BIOPSY: CPT | Performed by: CLINIC/CENTER

## 2023-08-21 PROCEDURE — 45385 COLONOSCOPY W/LESION REMOVAL: CPT | Performed by: INTERNAL MEDICINE

## 2023-08-21 PROCEDURE — 45385 COLONOSCOPY W/LESION REMOVAL: CPT | Performed by: CLINIC/CENTER

## 2023-08-21 PROCEDURE — 45380 COLONOSCOPY AND BIOPSY: CPT | Performed by: INTERNAL MEDICINE

## 2023-08-21 PROCEDURE — 00811 ANES LWR INTST NDSC NOS: CPT | Performed by: NURSE ANESTHETIST, CERTIFIED REGISTERED

## 2023-08-21 PROCEDURE — 88305 TISSUE EXAM BY PATHOLOGIST: CPT | Performed by: PATHOLOGY

## 2023-08-21 RX ORDER — CARVEDILOL 25 MG/1
TAKE 1 TABLET BY MOUTH TWICE DAILY TABLET, FILM COATED ORAL
Qty: 180 EACH | Refills: 3 | Status: ACTIVE | COMMUNITY

## 2023-08-21 RX ORDER — TESTOSTERONE CYPIONATE 200 MG/ML
1 ML INJECTION INTRAMUSCULAR
Status: ACTIVE | COMMUNITY

## 2023-08-21 RX ORDER — FINASTERIDE 5 MG/1
1 TABLET TABLET, FILM COATED ORAL ONCE A DAY
Status: ACTIVE | COMMUNITY

## 2023-08-21 RX ORDER — POLYETHYLENE GLYCOL 3350, SODIUM SULFATE ANHYDROUS, SODIUM BICARBONATE, SODIUM CHLORIDE, POTASSIUM CHLORIDE 236; 22.74; 6.74; 5.86; 2.97 G/4L; G/4L; G/4L; G/4L; G/4L
4000ML POWDER, FOR SOLUTION ORAL ONCE
Qty: 4000 MILLILITER | Refills: 0 | Status: ACTIVE | COMMUNITY
Start: 2023-07-14

## 2023-08-21 RX ORDER — DULOXETINE 60 MG/1
TAKE 1 CAPSULE BY MOUTH TWICE DAILY CAPSULE, DELAYED RELEASE ORAL
Qty: 180 EACH | Refills: 0 | Status: ACTIVE | COMMUNITY

## 2023-08-21 RX ORDER — SEMAGLUTIDE 1.34 MG/ML
AS DIRECTED INJECTION, SOLUTION SUBCUTANEOUS
Status: ACTIVE | COMMUNITY

## 2023-08-21 RX ORDER — LEVOTHYROXINE SODIUM 0.07 MG/1
TAKE 1 TABLET BY MOUTH ONCE DAILY TABLET ORAL
Qty: 90 EACH | Refills: 0 | Status: ACTIVE | COMMUNITY

## 2023-08-21 RX ORDER — AMLODIPINE BESYLATE 10 MG/1
TAKE 1 TABLET BY MOUTH ONCE DAILY TABLET ORAL
Qty: 90 EACH | Refills: 0 | Status: ACTIVE | COMMUNITY

## 2023-08-21 RX ORDER — GABAPENTIN 300 MG/1
TAKE 1 TO 2 CAPSULES BY MOUTH AT BEDTIME AS DIRECTED FOR NERVE PAIN CAPSULE ORAL
Qty: 60 EACH | Refills: 1 | Status: ACTIVE | COMMUNITY

## 2023-08-21 RX ORDER — HYDROCODONE BITARTRATE AND ACETAMINOPHEN 10; 325 MG/1; MG/1
TAKE 2 TABLETS BY MOUTH EVERY 8 HOURS AS NEEDED FOR PAIN TABLET ORAL
Qty: 180 EACH | Refills: 0 | Status: ACTIVE | COMMUNITY

## 2023-08-21 RX ORDER — METOLAZONE 5 MG/1
TAKE 1 TABLET BY MOUTH ONCE DAILY TABLET ORAL
Qty: 30 EACH | Refills: 0 | Status: ACTIVE | COMMUNITY

## 2023-08-21 RX ORDER — TAMSULOSIN HYDROCHLORIDE 0.4 MG/1
1 CAPSULE CAPSULE ORAL ONCE A DAY
Status: ACTIVE | COMMUNITY

## 2023-08-21 RX ORDER — LISINOPRIL 40 MG/1
TAKE 1 TABLET BY MOUTH ONCE DAILY FOR 90 DAYS TABLET ORAL
Qty: 90 EACH | Refills: 4 | Status: ACTIVE | COMMUNITY

## 2023-09-08 ENCOUNTER — OFFICE VISIT (OUTPATIENT)
Dept: URBAN - METROPOLITAN AREA CLINIC 60 | Facility: CLINIC | Age: 73
End: 2023-09-08
Payer: MEDICARE

## 2023-09-08 ENCOUNTER — DASHBOARD ENCOUNTERS (OUTPATIENT)
Age: 73
End: 2023-09-08

## 2023-09-08 VITALS
OXYGEN SATURATION: 91 % | TEMPERATURE: 99 F | HEIGHT: 72 IN | HEART RATE: 71 BPM | RESPIRATION RATE: 12 BRPM | WEIGHT: 231.4 LBS | DIASTOLIC BLOOD PRESSURE: 66 MMHG | SYSTOLIC BLOOD PRESSURE: 126 MMHG | BODY MASS INDEX: 31.34 KG/M2

## 2023-09-08 DIAGNOSIS — K59.03 DRUG-INDUCED CONSTIPATION: ICD-10-CM

## 2023-09-08 DIAGNOSIS — Z86.010 PERSONAL HISTORY OF COLONIC POLYPS: ICD-10-CM

## 2023-09-08 PROCEDURE — 99214 OFFICE O/P EST MOD 30 MIN: CPT | Performed by: PHYSICIAN ASSISTANT

## 2023-09-08 RX ORDER — DAPAGLIFLOZIN 10 MG/1
TAKE 1 TABLET BY MOUTH ONCE DAILY FOR 30 DAYS TABLET, FILM COATED ORAL
Qty: 30 EACH | Refills: 0 | Status: ACTIVE | COMMUNITY

## 2023-09-08 RX ORDER — METOLAZONE 5 MG/1
TAKE 1 TABLET BY MOUTH ONCE DAILY TABLET ORAL
Qty: 30 EACH | Refills: 0 | Status: ACTIVE | COMMUNITY

## 2023-09-08 RX ORDER — TESTOSTERONE CYPIONATE 200 MG/ML
1 ML INJECTION INTRAMUSCULAR
Status: ACTIVE | COMMUNITY

## 2023-09-08 RX ORDER — TAMSULOSIN HYDROCHLORIDE 0.4 MG/1
1 CAPSULE CAPSULE ORAL ONCE A DAY
Status: ACTIVE | COMMUNITY

## 2023-09-08 RX ORDER — GABAPENTIN 300 MG/1
TAKE 1 TO 2 CAPSULES BY MOUTH AT BEDTIME AS DIRECTED FOR NERVE PAIN CAPSULE ORAL
Qty: 60 EACH | Refills: 1 | Status: ACTIVE | COMMUNITY

## 2023-09-08 RX ORDER — HYDROCODONE BITARTRATE AND ACETAMINOPHEN 10; 325 MG/1; MG/1
TAKE 2 TABLETS BY MOUTH EVERY 8 HOURS AS NEEDED FOR PAIN TABLET ORAL
Qty: 180 EACH | Refills: 0 | Status: ACTIVE | COMMUNITY

## 2023-09-08 RX ORDER — FINASTERIDE 5 MG/1
1 TABLET TABLET, FILM COATED ORAL ONCE A DAY
Status: ACTIVE | COMMUNITY

## 2023-09-08 RX ORDER — CARVEDILOL 25 MG/1
TAKE 1 TABLET BY MOUTH TWICE DAILY TABLET, FILM COATED ORAL
Qty: 180 EACH | Refills: 3 | Status: ACTIVE | COMMUNITY

## 2023-09-08 RX ORDER — DULOXETINE 60 MG/1
TAKE 1 CAPSULE BY MOUTH TWICE DAILY CAPSULE, DELAYED RELEASE ORAL
Qty: 180 EACH | Refills: 0 | Status: ACTIVE | COMMUNITY

## 2023-09-08 RX ORDER — LISINOPRIL 40 MG/1
TAKE 1 TABLET BY MOUTH ONCE DAILY FOR 90 DAYS TABLET ORAL
Qty: 90 EACH | Refills: 4 | Status: ACTIVE | COMMUNITY

## 2023-09-08 RX ORDER — AMLODIPINE BESYLATE 10 MG/1
TAKE 1 TABLET BY MOUTH ONCE DAILY TABLET ORAL
Qty: 90 EACH | Refills: 0 | Status: ACTIVE | COMMUNITY

## 2023-09-08 RX ORDER — LEVOTHYROXINE SODIUM 0.07 MG/1
TAKE 1 TABLET BY MOUTH ONCE DAILY TABLET ORAL
Qty: 90 EACH | Refills: 0 | Status: ACTIVE | COMMUNITY

## 2023-09-08 NOTE — HPI-TODAY'S VISIT:
73-year-old male with diabetes mellitus, hypertension, CHF, atrial fibrillation status post Watchman procedure, PPM, opioid-induced constipation presents for follow-up after surveillance colonoscopy which was done on August 21, 2023.  His colonoscopy demonstrated a 7 mm tubular adenoma which was removed from the cecum.  A 15 mm tubulovillous adenoma was removed from the transverse colon.  A 6 mm tubular adenoma was removed from the descending colon.  A 5 mm polypoid lesion was found in the distal rectum near the dentate line and appeared to be originating from squamous mucosa.  The biopsy showed fibroepithelial polyp.  There was a moderate amount of stool in the entire colon interfering with visualization and repeat colonoscopy was recommended in 1 year. He reported using Senokot twice a day for constipation related to opioid use.  Sometimes his stools were firm on this regimen and he was advised to add MiraLAX 17 g once or twice a day as needed. He follows up doing well.  He had no problems following his procedure.  He has been having regular bowel movements on his regimen of Senokot.  He has no GI complaints.

## (undated) DEVICE — SOL H2O 1000ML BTL

## (undated) DEVICE — PLASTC TOOMEY SYRNG DISP

## (undated) DEVICE — CYSTO PACK: Brand: MEDLINE INDUSTRIES, INC.

## (undated) DEVICE — SOL H2O 3000ML IRRIG

## (undated) DEVICE — UROLOGY DRAIN BAG

## (undated) DEVICE — SOL  .9 3000ML

## (undated) DEVICE — STERILE SURGICAL LUBRICANT, METAL TUBE: Brand: SURGILUBE

## (undated) DEVICE — URINE DRAINAGE BAG,NEEDLE SAMPLING, ANTI-REFLUX DEVICE, DRAIN TUBE: Brand: DOVER

## (undated) DEVICE — MEDI-VAC NON-CONDUCTIVE SUCTION TUBING: Brand: CARDINAL HEALTH

## (undated) DEVICE — CATH URTH LBRCTH 24FR FL 3

## (undated) DEVICE — GAMMEX® NON-LATEX SIZE 7.5, STERILE NEOPRENE POWDER-FREE SURGICAL GLOVE: Brand: GAMMEX

## (undated) DEVICE — CONTAINER SPEC STR 4OZ GRY LID

## (undated) DEVICE — CATH SECURING DEVICE STATLOCK

## (undated) NOTE — LETTER
[address]ree Alvarado [address]  [facility] [place], [place], [place]  [address]  INFORMED CONSENT FOR TRANSFUSION OF BLOOD OR BLOOD PRODUCTS  My physician has informed me of the nature, purpose, benefits and risks of transfusion for blood and blood components that ______________________________________________  (Signature of Patient)                                                            (Responsible party in case of Minor,

## (undated) NOTE — MR AVS SNAPSHOT
[address] [address]  [phone]               Thank you for choosing us for your health care visit with Rebecca Rincon MD.  We are glad to serve you and happy to provide you with this ferrer * HYDROcodone-acetaminophen  MG Tabs   Take 2 tablets by mouth every 8 (eight) hours as needed for Pain. What changed:  Another medication with the same name was added. Make sure you understand how and when to take each.    Commonly known as:  [facility] Generic drug:  Insulin Pen Needle           Vitamin D3 2000 units Tabs   Take by mouth.  Take 3 tabs two times a day           Warfarin Sodium 5 MG Tabs   Take 1 to 1.5 tabs daily as directed by [facility] anticoagulation clinic   Commonly known as:  COUMADIN toilet. ? Use a non skid rubber mat in the tub/shower. ? If you are unsteady on your feet you may want to use a shower chair/bench and a hand held shower head while bathing/showering.   BEDROOM:  ? Place light switches within reach of your bed and a night

## (undated) NOTE — LETTER
[address] [place] [address]  Authorization for Invasive Procedures  1.  I hereby authorize  Dr. Amirah Pino / Frances Selby  , my physician and whomever may be designated as the doctor's assistant, to perform the following operation and/or procedure: P performed for the purposes of advancing medicine, science, and/or education, provided my identity is not revealed. If the procedure has been videotaped, the physician/surgeon will obtain the original videotape.  The hospital will not be responsible for stor My signature below affirms that prior to the time of the procedure, I have explained to the patient and/or his legal representative, the risks and benefits involved in the proposed treatment and any reasonable alternative to the proposed treatment.  I have

## (undated) NOTE — LETTER
[facility], [place]  Authorization for Surgical Operation or Procedure          1.  I hereby authorize Dr. Evita Gibson , my physician and the assistant, to perform the following operation and/or procedure:  ULTRASOUND GUIDED RIGHT THY performed for the purposes of advancing medicine, science, and/or education, provided my identity is not revealed. If the procedure has been videotaped, the physician/surgeon will obtain the original videotape.  The hospital will not be responsible for stor alternative to the proposed treatment. I have also explained the risks and benefits involved in the refusal of the proposed treatment and have answered the patient's questions.  If I have a significant financial interest in a co-management agreement or a si

## (undated) NOTE — LETTER
[address] [place] [address] Rd, [place], [place]     AUTHORIZATION FOR SURGICAL OPERATION OR PROCEDURE    I hereby authorize Dr. Nevaeh Sellers MD, my Physician(s) and whomever may be designated as the doctor's Assistant, to perform the f 4. I consent to the photographing of procedure(s) to be performed for the purposes of advancing medicine, science and/or education, provided my identity is not revealed.  If the procedure has been videotaped, the physician/surgeon will obtain the original v (Witness signature)                                                                                                  (Date)                                (Time)  STATEMENT OF PHYSICIAN My signature below affirms that prior to the time of the procedure;  I

## (undated) NOTE — LETTER
[facility]  Administration of Anesthesia  1. Abdelrahman Sarmiento, or _________________________________ acting on his behalf, (Patient) (Dependent/Representative) request to receive anesthesia for my pending procedure/operation/treatment. infections, high spinal block, spinal bleeding, seizure, cardiac arrest and death. 7. AWARENESS: I understand that it is possible (but unlikely) to have explicit memory of events from the operating room while under general anesthesia.   8. ELECTROCONVULSIV unconscious pt /Relationship    My signature below affirms that prior to the time of the procedure, I have explained to the patient and/or his/her guardian, the risks and benefits of undergoing anesthesia, as well as any reasonable alternatives.     _______

## (undated) NOTE — Clinical Note
January 17, 2017         Paola Staples MD  [address]      Patient: Kirill Vaughn   YOB: 1950   Date of Visit: 1/16/2017       Dear Dr. Nati Stokes MD,    I saw your patient, Kirill Vaughn, on 1/16/20 /bladder issues - had to self catheter.    He has mild renal insufficiency.    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 Arianne Band  He was is in Afib and now on xarelto - this was 2 years ago.    H His sugars are still 200. He had a discussion about starting victoza with dr. Colette Leung but he can't afford it. He's on lantus. He is in donut hole right now.    He's on stool softener bid for constipation.    He can raise his arms up now.  It's painful but h He is good today. He has 2/10.    He has good days and bad days. Today is good.    He tries not to take more than 3 tablets norco a day. Sometimes he takes it more. It depends on the day.  If he takes it three times a day he's ok.    Today he doesn't need a - cont.  gabapentin  800 mg at night.    -    - consider 2nd opinion - on how to treat -    - in the past -  had a long discussion about his care - d/w him that rheumatology wise not able to treat his inflammation - he should seek 2nd opinion.  He feels nor

## (undated) NOTE — LETTER
[place] [address]      10/21/2019    Dear Ha Moses,    It has come to our attention that you are due for: CT in November 2019. This test was ordered by Dr. Theresa Cadet.     Please call for an appointment at ([phone])

## (undated) NOTE — LETTER
[address], [place]  Authorization for Invasive Procedures  1.  I hereby authorize  Dr Paramjit Medina, my physician and whomever may be designated as the doctor's assistant, to perform the following operation and/or procedure: Upgrade to [facility] performed for the purposes of advancing medicine, science, and/or education, provided my identity is not revealed. If the procedure has been videotaped, the physician/surgeon will obtain the original videotape.  The hospital will not be responsible for stor My signature below affirms that prior to the time of the procedure, I have explained to the patient and/or his legal representative, the risks and benefits involved in the proposed treatment and any reasonable alternative to the proposed treatment.  I have

## (undated) NOTE — LETTER
[facility], [place]  Authorization for Invasive Procedures  1.  I hereby authorize  Dr. Paz Mejia  , my physician and whomever may be designated as the doctor's assistant, to perform the following operation and/or procedure: Pre-Watchm performed for the purposes of advancing medicine, science, and/or education, provided my identity is not revealed. If the procedure has been videotaped, the physician/surgeon will obtain the original videotape.  The hospital will not be responsible for stor My signature below affirms that prior to the time of the procedure, I have explained to the patient and/or his legal representative, the risks and benefits involved in the proposed treatment and any reasonable alternative to the proposed treatment.  I have

## (undated) NOTE — LETTER
Hospital Discharge Documentation  Please phone to schedule a hospital follow up appointment.     From: [address] Hospitalist's Office  Phone: [phone]    Patient discharged time/date: 4/9/2021  4:08 PM  Patient discharge disposition:  Home or Self on IV diuretics, followed by cardiology. He had an echocardiogram done which did not show significant systolic dysfunction. Previous micro regurgitation was shown to have resolved after MitraClip. Patient's renal function remained steady.   His symptoms tablet (81 mg total) by mouth daily. Quantity: 30 tablet  Refills: 1     B-12 1000 MCG Caps      Take 1 capsule by mouth 2 (two) times daily. Take 1 tab two times a day   Refills: 0     Blood Glucose Test Strp      Test three times daily as directed.    R every morning. Refills: 0     lisinopril 40 MG Tabs      Take 1 tablet (40 mg total) by mouth daily. Quantity: 30 tablet  Refills: 2     metFORMIN HCl  MG Tb24  Commonly known as: GLUCOPHAGE-XR      500 mg 2 (two) times daily with meals.    Refill TCM Follow-Up Recommendation:  LACE > 58:  High Risk of readmission after discharge from the hospital.[WW.1]              Electronically signed by Jany Shah MD on 4/9/2021  2:07 PM   Attribution Key     WW.[email or web address] - Jany Shah MD on 4/9/2021  2:06 PM

## (undated) NOTE — MR AVS SNAPSHOT
[address] [address]  [phone]               Thank you for choosing us for your health care visit with Philippe Laam MD.  We are glad to serve you and happy to provide you with this ferrer Take 2 tablets by mouth every 8 (eight) hours as needed for Pain. What changed:    - Another medication with the same name was added. Make sure you understand how and when to take each. - Another medication with the same name was changed.  Make sure you Take by mouth.  Take 3 tabs two times a day           Warfarin Sodium 5 MG Tabs   Take 1 to 1.5 tabs daily as directed by [facility] anticoagulation clinic   Commonly known as:  COUMADIN           ZANTAC 150 MG Tabs   Generic drug:  RaNITidine HCl   Take 150 mg b Weight Reduction Maintain normal body weight (body mass index 18.5-24.9 kg/m2)   DASH eating plan Adopt a diet rich in fruits, vegetables, and low fat dairy products with reduced content of saturated and total fat.    Dietary sodium reduction Reduce dietary

## (undated) NOTE — LETTER
[facility] [address], [address]  Consent for Procedure/Sedation    Date:     Time:       1. I authorize the performance upon Ina Delia the following:  Edilson     2.  I authorize Dr. Jessica Cardenas, Carlene Barreto, [place] ________________________________    ___________________    Witness: _________________________      Date: ___________________    Printed: 2020   11:20 AM  Patient Name: Tanner Leblanc        : 7/10/1950       Medical Record #: [id number]

## (undated) NOTE — Clinical Note
April 18, 2017         Arnetta Duverney, MD  [address]      Patient: Ryland Goff   YOB: 1950   Date of Visit: 4/17/2017       Dear Dr. 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 Matty Figueroa He was is in afib and now on xarelto - this was 2 years ago. He was in the hospital for 7 days. He had a lot of testing.  This was [facility] dr. Medina Gather but he can't afford it. He's on lantus. He is in donut hole right now. He's on stool softener bid for constipation. He can raise his arms up now. It's painful but he can do it. Before he wasn't able to do it.    He's on 50 units of lantus a He tries not to take more than 3 tablets norco a day. Sometimes he takes it more. It depends on the day. If he takes it three times a day he's ok. Today he doesn't need a cane but sometimes he needs a cane. He is doing better inexplicable. No fevers. - treat like PMR  - he is completely intolerant to all  steroid sparing agents -    Couldn't tolerated mtx or leflunomide - or azathioprine 50mg a day  - off pred b/c of sugars -doesn't want to restart - .   - stop  methotrexate B/c of nauseas , stopped le

## (undated) NOTE — LETTER
[address], [place]  Authorization for Invasive Procedures  1.  I hereby authorize  Dr. Saravanan Min , my physician and whomever may be designated as the doctor's assistant, to perform the following operation and/or procedure: Cardiac Cat 5. I consent to the photographing of the operations or procedures to be performed for the purposes of advancing medicine, science, and/or education, provided my identity is not revealed.  If the procedure has been videotaped, the physician/surgeon will obta __________ Time: ___________    Statement of Physician  My signature below affirms that prior to the time of the procedure, I have explained to the patient and/or his legal representative, the risks and benefits involved in the proposed treatment and any r

## (undated) NOTE — LETTER
[facility]  Administration of Anesthesia  1. Jesus Mari, Ramon Baron, or _________________________________ acting on his behalf, (Patient) (Dependent/Representative) request to receive anesthesia for my pending procedure/operation/treatment. infections, high spinal block, spinal bleeding, seizure, cardiac arrest and death. 7. AWARENESS: I understand that it is possible (but unlikely) to have explicit memory of events from the operating room while under general anesthesia.   8. ELECTROCONVULSIV unconscious pt /Relationship    My signature below affirms that prior to the time of the procedure, I have explained to the patient and/or his/her guardian, the risks and benefits of undergoing anesthesia, as well as any reasonable alternatives.     _______

## (undated) NOTE — LETTER
01/16/21    [address] [address]      Dear Mike Best,    [facility] records indicate that you have outstanding lab work and or testing that was ordered for you and has not yet been completed: Please call Central Schedul

## (undated) NOTE — LETTER
Hospital Discharge Documentation  Please phone to schedule a hospital follow up appointment.     From: [address] Hospitalist's Office  Phone: [phone]    Patient discharged time/date: 11/5/2019  3:00 PM  Patient discharge disposition:  Home or Self Discharge Physical Exam:   Physical Exam:    General: No acute distress. Respiratory: Clear to auscultation bilaterally. No wheezes. No rhonchi. Cardiovascular: S1, S2. Regular rate and rhythm. No murmurs, rubs or gallops.    Abdomen: Soft, nontender, no Surgical Procedures     Case IDs Date Procedure Surgeon Location Status    [id number] 11/3/19 CYSTOSCOPY Kahlil Moreau MD [facility] MAIN OR Comp            Discharge Plan:   Discharge Condition: Stable    Current Discharge Medication List    New Orders    finas Omega-3 Fatty Acids (FISH OIL) 1200 MG Oral Cap  Take  by mouth daily. Coenzyme Q10 (COQ10) 200 MG Oral Cap  Take 200 mg by mouth daily. !! Cholecalciferol (VITAMIN D3) 2000 UNITS Oral Tab  Take 2 tablets by mouth daily.       Cyanocobalamin (B-12) Take 6 tablets by mouth 2 (two) times daily. Take 6 tabs two times per day   Refills:  0     diphenhydrAMINE-APAP (sleep)  MG Caps      Take 3 tablets by mouth nightly.    Refills:  0     DULoxetine HCl 60 MG Cpep  Commonly known as:  CYMBALTA      T cholecalciferol 1000 UNITS Caps  Commonly known as:  VITAMIN D3      Take 2,000 Units by mouth daily with dinner. 4,000 units with breakfast and 2,000 units with dinner   Refills:  0     Vitamin D3 50 MCG (2000 UT) Tabs      Take 2 tablets by mouth daily.

## (undated) NOTE — LETTER
Charla Miller [address] [address]  [place], [address] [facility]    Consent for Operation  Date: __________________                                Time: _______________    1.  I authorize the performance upon Sim Liao the following operation:  John Oliveros videotape. [facility] will not be responsible for storage or maintenance of this tape. 6. For the purpose of advancing medical education, I consent to the admittance of observers to the Operating Room.   7. I authorize the use of any specimen, organs, ti When the patient is a minor or mentally incompetent to give consent:  Signature of person authorized to consent for patient: ___________________________   Relationship to patient: ____________________________________________________    Signature of Witness these medicines may increase my risk of anesthetic complications. iv. If I am allergic to anything or have had a reaction to anesthesia before. 3. I understand how the anesthesia medicine will help me (benefits).   4. I understand that with any type of an patient’s representative) and answered their questions. The patient or their representative has agreed to have anesthesia services.     _____________________________________________________________________________  Witness        Date   Time  I have anthony